# Patient Record
Sex: FEMALE | Race: WHITE | NOT HISPANIC OR LATINO | Employment: OTHER | URBAN - METROPOLITAN AREA
[De-identification: names, ages, dates, MRNs, and addresses within clinical notes are randomized per-mention and may not be internally consistent; named-entity substitution may affect disease eponyms.]

---

## 2023-01-19 ENCOUNTER — TELEPHONE (OUTPATIENT)
Dept: OTOLARYNGOLOGY | Facility: CLINIC | Age: 63
End: 2023-01-19

## 2023-01-19 ENCOUNTER — OFFICE VISIT (OUTPATIENT)
Dept: OTOLARYNGOLOGY | Facility: CLINIC | Age: 63
End: 2023-01-19
Payer: COMMERCIAL

## 2023-01-19 VITALS — TEMPERATURE: 97.3 F | BODY MASS INDEX: 23.06 KG/M2 | WEIGHT: 138.4 LBS | HEIGHT: 65 IN

## 2023-01-19 DIAGNOSIS — K14.8 MASS OF TONGUE: Primary | ICD-10-CM

## 2023-01-19 DIAGNOSIS — J39.2 MASS OF PHARYNX: ICD-10-CM

## 2023-01-19 PROCEDURE — 31575 DIAGNOSTIC LARYNGOSCOPY: CPT | Performed by: OTOLARYNGOLOGY

## 2023-01-19 PROCEDURE — 99204 OFFICE O/P NEW MOD 45 MIN: CPT | Performed by: OTOLARYNGOLOGY

## 2023-01-19 RX ORDER — AMOXICILLIN 875 MG/1
875 TABLET, COATED ORAL EVERY 12 HOURS
COMMUNITY
Start: 2023-01-06 | End: 2023-02-09

## 2023-01-19 NOTE — PROGRESS NOTES
"Patient Name: Carina Serna   Visit Date: 01/19/2023   Patient ID: [id number]  Provider: Migel Escobar MD    Sex: female  Location: [facility]   YOB: 1960  Location Address: [address] [place] [address], [address] [place] [address],?[place]?[id number]    Primary Care Provider Provider, No Known  Location Phone: [phone]    Referring Provider: GRACY Samuels        Chief Complaint  Chronic disease of tonsils and adenoids  (New patient )    Subjective    History of Present Illness  Carina Serna is a 62 y.o. female who presents to [facility] today as a consult from GRACY Samuels.    She presents to the clinic today for evaluation of right-sided throat pain that she has had for several months.  She is a chronic smoker, currently smokes about 1 pack/day.  She informs me that she has had her tonsils out previously.  She denies any weight loss or pain or difficulty with swallowing.  She has had no breathing issues.  Her nurse practitioner noticed the lesion along her right tonsil and referred her for further evaluation.    Past Medical History:   Diagnosis Date   • Dental disease    • Sore throat        Past Surgical History:   Procedure Laterality Date   • BLADDER SURGERY     • EYE SURGERY     • HYSTERECTOMY           Current Outpatient Medications:   •  amoxicillin (AMOXIL) 875 MG tablet, Take 875 mg by mouth Every 12 (Twelve) Hours., Disp: , Rfl:      No Known Allergies    Family History   Problem Relation Age of Onset   • Cancer Mother         Colon cancer        Social History     Social History Narrative   • Not on file       Objective     Vital Signs:   Temp 97.3 °F (36.3 °C) (Tympanic)   Ht 165.1 cm (65\")   Wt 62.8 kg (138 lb 6.4 oz)   BMI 23.03 kg/m²       Physical Exam    Flexible laryngoscopy    Date/Time: 1/19/2023 1:26 PM  Performed by: Migel Escobar MD  Authorized by: Migel Escobar MD     Procedure details:     " Indications: staging of a suspected neoplasm      Medication:  Afrin    Instrument: flexible fiberoptic laryngoscope      Scope location: right nare    Comments:      The nasal cavity is free of any lesions, polyps, or purulence.  There is a mass at the right lateral base of tongue/glossotonsillar sulcus that appears to be ulcerative.  Consistent in appearance with squamous cell carcinoma.  Vocal fold mobility is normal.        Constitutional   Appearance  · : well developed, well-nourished, alert and in no acute distress, voice clear and strong    Head  Inspection  · : no deformities or lesions  Face  Inspection  · : No facial lesions; House-Brackmann I/VI bilaterally  Palpation  · : No TMJ crepitus nor  muscle tenderness bilaterally    Eyes  Vision  Visual Fields  · : Extraocular movements are intact. No spontaneous or gaze-induced nystagmus.  Conjunctivae  · : clear  Sclerae  · : clear  Pupils and Irises  · : pupils equal, round, and reactive to light.     Ears, Nose, Mouth and Throat    Ears    External Ears  · : appearance within normal limits, no lesions present  Otoscopic Examination  · : Tympanic membrane appearance within normal limits bilaterally without perforations, well-aerated middle ears  Hearing  · : intact to conversational voice both ears  Tunning fork testing:     :    Nose    External Nose  · : appearance normal  Intranasal Exam  · : mucosa within normal limits, vestibules normal, no intranasal lesions present, septum midline, sinuses non tender to percussion  Oral Cavity    Oral Mucosa  · : oral mucosa normal without pallor or cyanosis  Lips  · : lip appearance normal  Teeth  · : Grossly carious, and poor repair  Gums  · : gums pink, non-swollen, no bleeding present  Tongue  · : tongue with neoplasm along the glossotonsillar sulcus on the right, appears malignant  Palate  · : hard palate normal, soft palate appearance normal with symmetric mobility    Throat    Oropharynx  · :  Ulcerative mass present in the right glossotonsillar sulcus, tonsils surgically absent  Hypopharynx  · : appearance within normal limits, superior epiglottis within normal limits  Larynx  · : appearance within normal limits, vocal cords within normal limits, no lesions present    Neck  Inspection/Palpation  · : normal appearance, no masses or tenderness, trachea midline; thyroid size normal, nontender, no nodules or masses present on palpation    Respiratory  Respiratory Effort  · : breathing unlabored  Inspection of Chest  · : normal appearance, no retractions    Cardiovascular  Heart  · : regular rate and rhythm    Lymphatic  Neck  · : no lymphadenopathy present  Supraclavicular Nodes  · : no lymphadenopathy present  Preauricular Nodes  · : no lymphadenopathy present    Skin and Subcutaneous Tissue  General Inspection  · : Regarding face and neck - there are no rashes present, no lesions present, and no areas of discoloration    Neurologic  Cranial Nerves  · : cranial nerves II-XII are grossly intact bilaterally  Gait and Station  · : normal gait, able to stand without diffculty    Psychiatric  Judgement and Insight  · : judgment and insight intact  Mood and Affect  · : mood normal, affect appropriate          Assessment and Plan    Diagnoses and all orders for this visit:    1. Mass of tongue (Primary)  -     CT Soft Tissue Neck With Contrast; Future  -     CT Chest With Contrast Diagnostic; Future  -     Case Request; Standing  -     Case Request    2. Mass of pharynx  -     CT Soft Tissue Neck With Contrast; Future  -     CT Chest With Contrast Diagnostic; Future  -     Case Request; Standing  -     Case Request    Other orders  -     $ Laryngoscopy  -     Follow Anesthesia Guidelines / Protocol; Future  -     Follow Anesthesia Guidelines / Protocol; Standing  -     Verify NPO Status; Standing  -     Obtain Informed Consent; Standing    Examination today revealed a mass along the right side of her base of  tongue along the glossotonsillar sulcus.  This appears to be malignant.  I discussed the findings with her, did recommend CT scan of the neck and chest as well as proceeding to the operating room for direct laryngoscopy with biopsies, bronchoscopy, esophagoscopy.  I described the procedure to her in detail, including the possible complications and alternatives.  She understands, and would like to proceed.  I will make arrangements to have her scheduled for this in the near future.  We had a lengthy discussion about the importance of not smoking and the relation between smoking and head and neck cancer.  She will try to cut down.    Follow Up   No follow-ups on file.  Patient was given instructions and counseling regarding her condition or for health maintenance advice. Please see specific information pulled into the AVS if appropriate.

## 2023-01-25 ENCOUNTER — HOSPITAL ENCOUNTER (OUTPATIENT)
Dept: CT IMAGING | Facility: HOSPITAL | Age: 63
Discharge: HOME | End: 2023-01-25
Admitting: OTOLARYNGOLOGY
Payer: COMMERCIAL

## 2023-01-25 DIAGNOSIS — K14.8 MASS OF TONGUE: ICD-10-CM

## 2023-01-25 DIAGNOSIS — J39.2 MASS OF PHARYNX: ICD-10-CM

## 2023-01-25 LAB
CREAT BLDA-MCNC: 0.8 MG/DL
EGFRCR SERPLBLD CKD-EPI 2021: 83.4 ML/MIN/1.73

## 2023-01-25 PROCEDURE — 0 IOPAMIDOL PER 1 ML: Performed by: OTOLARYNGOLOGY

## 2023-01-25 PROCEDURE — 71260 CT THORAX DX C+: CPT

## 2023-01-25 PROCEDURE — 70491 CT SOFT TISSUE NECK W/DYE: CPT

## 2023-01-25 PROCEDURE — 82565 ASSAY OF CREATININE: CPT

## 2023-01-25 RX ADMIN — IOPAMIDOL 140 ML: 755 INJECTION, SOLUTION INTRAVENOUS at 14:06

## 2023-01-27 ENCOUNTER — TELEPHONE (OUTPATIENT)
Dept: OTOLARYNGOLOGY | Facility: CLINIC | Age: 63
End: 2023-01-27

## 2023-01-27 ENCOUNTER — TELEPHONE (OUTPATIENT)
Dept: OTOLARYNGOLOGY | Facility: CLINIC | Age: 63
End: 2023-01-27
Payer: COMMERCIAL

## 2023-01-27 NOTE — TELEPHONE ENCOUNTER
Caller: MICHELLE WU    Relationship to patient: SELF    Best call back number: [phone]    Chief complaint: Mass of tongue, Mass of pharynx     Type of visit: DIRECT LARYNGOSCOPY, ESOPHAGOSCOPY, BRONCHOSCOPY     Requested date: UNSURE YET     If rescheduling, when is the original appointment: 1/30/23    Additional notes: PT'S DAUGHTER IS PREGNANT AND BEING INDUCED ON 1/30/23 SO SHE WILL CALL BACK TO RESCHEDULE HER SURGERY. SHE WILL NOT BE ABLE TO HAVE IT ON 1/30/23.

## 2023-01-27 NOTE — TELEPHONE ENCOUNTER
Left message for patient to call me back ASAP regarding her surgery scheduled on 01/30/2023 that she wants to reschedule.

## 2023-01-30 ENCOUNTER — ANESTHESIA (OUTPATIENT)
Dept: PERIOP | Facility: HOSPITAL | Age: 63
End: 2023-01-30

## 2023-01-30 ENCOUNTER — ANESTHESIA EVENT (OUTPATIENT)
Dept: PERIOP | Facility: HOSPITAL | Age: 63
End: 2023-01-30

## 2023-02-01 ENCOUNTER — TELEPHONE (OUTPATIENT)
Dept: OTOLARYNGOLOGY | Facility: CLINIC | Age: 63
End: 2023-02-01
Payer: COMMERCIAL

## 2023-02-01 NOTE — TELEPHONE ENCOUNTER
Please see no show letter regarding missed surgery date on 01/30/2023. Patient was also tried several times by phone number to be reached with no success.

## 2023-02-09 NOTE — PRE-PROCEDURE INSTRUCTIONS
PATIENT INSTRUCTED TO BE:    - NPO AFTER MIDNIGHT EXCEPT CAN HAVE CLEAR LIQUIDS 2 HOURS PRIOR TO SURGERY ARRIVAL TIME     - TO HOLD ALL VITAMINS, SUPPLEMENTS, NSAIDS FOR ONE WEEK PRIOR TO THEIR SURGICAL PROCEDURE    - INSTRUCTED PT TO USE SURGICAL SOAP 1 TIME THE NIGHT PRIOR TO SURGERY OR THE AM OF SURGERY.   USE SOAP FROM NECK TO TOES AVOID THEIR FACE, HAIR, AND PRIVATE PARTS. INSTRUCTED NO LOTIONS, JEWELRY, PIERCINGS, OR DEODORANT DAY OF SURGERY        - INSTRUCTED TO TAKE THE FOLLOWING MEDICATIONS THE DAY OF SURGERY:   NONE    PATIENT VERBALIZED UNDERSTANDING

## 2023-02-10 ENCOUNTER — HOSPITAL ENCOUNTER (OUTPATIENT)
Facility: HOSPITAL | Age: 63
Setting detail: HOSPITAL OUTPATIENT SURGERY
Discharge: HOME | End: 2023-02-10
Attending: OTOLARYNGOLOGY | Admitting: OTOLARYNGOLOGY
Payer: COMMERCIAL

## 2023-02-10 ENCOUNTER — ANESTHESIA (OUTPATIENT)
Dept: PERIOP | Facility: HOSPITAL | Age: 63
End: 2023-02-10
Payer: COMMERCIAL

## 2023-02-10 ENCOUNTER — ANESTHESIA EVENT (OUTPATIENT)
Dept: PERIOP | Facility: HOSPITAL | Age: 63
End: 2023-02-10
Payer: COMMERCIAL

## 2023-02-10 VITALS
HEIGHT: 65 IN | HEART RATE: 68 BPM | SYSTOLIC BLOOD PRESSURE: 142 MMHG | DIASTOLIC BLOOD PRESSURE: 83 MMHG | OXYGEN SATURATION: 92 % | BODY MASS INDEX: 21.01 KG/M2 | TEMPERATURE: 97.9 F | WEIGHT: 126.1 LBS | RESPIRATION RATE: 17 BRPM

## 2023-02-10 DIAGNOSIS — K14.8 MASS OF TONGUE: ICD-10-CM

## 2023-02-10 DIAGNOSIS — J39.2 MASS OF PHARYNX: ICD-10-CM

## 2023-02-10 PROCEDURE — 63710000001 PROMETHAZINE PER 12.5 MG: Performed by: NURSE ANESTHETIST, CERTIFIED REGISTERED

## 2023-02-10 PROCEDURE — 25010000002 ONDANSETRON PER 1 MG: Performed by: NURSE ANESTHETIST, CERTIFIED REGISTERED

## 2023-02-10 PROCEDURE — 25010000002 DEXAMETHASONE PER 1 MG: Performed by: NURSE ANESTHETIST, CERTIFIED REGISTERED

## 2023-02-10 PROCEDURE — 88342 IMHCHEM/IMCYTCHM 1ST ANTB: CPT | Performed by: OTOLARYNGOLOGY

## 2023-02-10 PROCEDURE — 43200 ESOPHAGOSCOPY FLEXIBLE BRUSH: CPT | Performed by: OTOLARYNGOLOGY

## 2023-02-10 PROCEDURE — 25010000002 MIDAZOLAM PER 1 MG: Performed by: ANESTHESIOLOGY

## 2023-02-10 PROCEDURE — 88305 TISSUE EXAM BY PATHOLOGIST: CPT | Performed by: OTOLARYNGOLOGY

## 2023-02-10 PROCEDURE — 31622 DX BRONCHOSCOPE/WASH: CPT | Performed by: OTOLARYNGOLOGY

## 2023-02-10 PROCEDURE — 31536 LARYNGOSCOPY W/BX & OP SCOPE: CPT | Performed by: OTOLARYNGOLOGY

## 2023-02-10 PROCEDURE — 25010000002 FENTANYL CITRATE (PF) 50 MCG/ML SOLUTION: Performed by: NURSE ANESTHETIST, CERTIFIED REGISTERED

## 2023-02-10 PROCEDURE — 25010000002 PROPOFOL 10 MG/ML EMULSION: Performed by: NURSE ANESTHETIST, CERTIFIED REGISTERED

## 2023-02-10 PROCEDURE — 93010 ELECTROCARDIOGRAM REPORT: CPT | Performed by: INTERNAL MEDICINE

## 2023-02-10 PROCEDURE — 93005 ELECTROCARDIOGRAM TRACING: CPT | Performed by: OTOLARYNGOLOGY

## 2023-02-10 RX ORDER — OXYMETAZOLINE HYDROCHLORIDE 0.05 G/100ML
SPRAY NASAL AS NEEDED
Status: DISCONTINUED | OUTPATIENT
Start: 2023-02-10 | End: 2023-02-10 | Stop reason: HOSPADM

## 2023-02-10 RX ORDER — HYDROCODONE BITARTRATE AND ACETAMINOPHEN 5; 325 MG/1; MG/1
1 TABLET ORAL EVERY 6 HOURS PRN
Qty: 30 TABLET | Refills: 0 | Status: SHIPPED | OUTPATIENT
Start: 2023-02-10

## 2023-02-10 RX ORDER — FENTANYL CITRATE 50 UG/ML
INJECTION, SOLUTION INTRAMUSCULAR; INTRAVENOUS AS NEEDED
Status: DISCONTINUED | OUTPATIENT
Start: 2023-02-10 | End: 2023-02-10 | Stop reason: SURG

## 2023-02-10 RX ORDER — LIDOCAINE HYDROCHLORIDE 20 MG/ML
INJECTION, SOLUTION EPIDURAL; INFILTRATION; INTRACAUDAL; PERINEURAL AS NEEDED
Status: DISCONTINUED | OUTPATIENT
Start: 2023-02-10 | End: 2023-02-10 | Stop reason: SURG

## 2023-02-10 RX ORDER — DEXAMETHASONE SODIUM PHOSPHATE 4 MG/ML
INJECTION, SOLUTION INTRA-ARTICULAR; INTRALESIONAL; INTRAMUSCULAR; INTRAVENOUS; SOFT TISSUE AS NEEDED
Status: DISCONTINUED | OUTPATIENT
Start: 2023-02-10 | End: 2023-02-10 | Stop reason: SURG

## 2023-02-10 RX ORDER — OXYCODONE HYDROCHLORIDE 5 MG/1
5 TABLET ORAL
Status: DISCONTINUED | OUTPATIENT
Start: 2023-02-10 | End: 2023-02-10 | Stop reason: HOSPADM

## 2023-02-10 RX ORDER — PHENYLEPHRINE HCL IN 0.9% NACL 1 MG/10 ML
SYRINGE (ML) INTRAVENOUS AS NEEDED
Status: DISCONTINUED | OUTPATIENT
Start: 2023-02-10 | End: 2023-02-10 | Stop reason: SURG

## 2023-02-10 RX ORDER — ACETAMINOPHEN 500 MG
1000 TABLET ORAL ONCE
Status: COMPLETED | OUTPATIENT
Start: 2023-02-10 | End: 2023-02-10

## 2023-02-10 RX ORDER — PROMETHAZINE HYDROCHLORIDE 25 MG/1
25 SUPPOSITORY RECTAL ONCE AS NEEDED
Status: COMPLETED | OUTPATIENT
Start: 2023-02-10 | End: 2023-02-10

## 2023-02-10 RX ORDER — ONDANSETRON 2 MG/ML
4 INJECTION INTRAMUSCULAR; INTRAVENOUS ONCE AS NEEDED
Status: DISCONTINUED | OUTPATIENT
Start: 2023-02-10 | End: 2023-02-10 | Stop reason: HOSPADM

## 2023-02-10 RX ORDER — MIDAZOLAM HYDROCHLORIDE 1 MG/ML
2 INJECTION INTRAMUSCULAR; INTRAVENOUS ONCE
Status: COMPLETED | OUTPATIENT
Start: 2023-02-10 | End: 2023-02-10

## 2023-02-10 RX ORDER — ONDANSETRON 4 MG/1
4 TABLET, FILM COATED ORAL EVERY 8 HOURS PRN
Qty: 12 TABLET | Refills: 0 | Status: SHIPPED | OUTPATIENT
Start: 2023-02-10 | End: 2023-02-21

## 2023-02-10 RX ORDER — DEXMEDETOMIDINE HYDROCHLORIDE 100 UG/ML
INJECTION, SOLUTION INTRAVENOUS AS NEEDED
Status: DISCONTINUED | OUTPATIENT
Start: 2023-02-10 | End: 2023-02-10 | Stop reason: SURG

## 2023-02-10 RX ORDER — ONDANSETRON 2 MG/ML
INJECTION INTRAMUSCULAR; INTRAVENOUS AS NEEDED
Status: DISCONTINUED | OUTPATIENT
Start: 2023-02-10 | End: 2023-02-10 | Stop reason: SURG

## 2023-02-10 RX ORDER — SODIUM CHLORIDE, SODIUM LACTATE, POTASSIUM CHLORIDE, CALCIUM CHLORIDE 600; 310; 30; 20 MG/100ML; MG/100ML; MG/100ML; MG/100ML
9 INJECTION, SOLUTION INTRAVENOUS CONTINUOUS PRN
Status: DISCONTINUED | OUTPATIENT
Start: 2023-02-10 | End: 2023-02-10 | Stop reason: HOSPADM

## 2023-02-10 RX ORDER — MEPERIDINE HYDROCHLORIDE 25 MG/ML
12.5 INJECTION INTRAMUSCULAR; INTRAVENOUS; SUBCUTANEOUS
Status: DISCONTINUED | OUTPATIENT
Start: 2023-02-10 | End: 2023-02-10 | Stop reason: HOSPADM

## 2023-02-10 RX ORDER — PROMETHAZINE HYDROCHLORIDE 12.5 MG/1
25 TABLET ORAL ONCE AS NEEDED
Status: COMPLETED | OUTPATIENT
Start: 2023-02-10 | End: 2023-02-10

## 2023-02-10 RX ORDER — PROPOFOL 10 MG/ML
VIAL (ML) INTRAVENOUS AS NEEDED
Status: DISCONTINUED | OUTPATIENT
Start: 2023-02-10 | End: 2023-02-10 | Stop reason: SURG

## 2023-02-10 RX ORDER — ROCURONIUM BROMIDE 10 MG/ML
INJECTION, SOLUTION INTRAVENOUS AS NEEDED
Status: DISCONTINUED | OUTPATIENT
Start: 2023-02-10 | End: 2023-02-10 | Stop reason: SURG

## 2023-02-10 RX ADMIN — LIDOCAINE HYDROCHLORIDE 100 MG: 20 INJECTION, SOLUTION EPIDURAL; INFILTRATION; INTRACAUDAL; PERINEURAL at 12:40

## 2023-02-10 RX ADMIN — DEXAMETHASONE SODIUM PHOSPHATE 4 MG: 4 INJECTION INTRA-ARTICULAR; INTRALESIONAL; INTRAMUSCULAR; INTRAVENOUS; SOFT TISSUE at 12:51

## 2023-02-10 RX ADMIN — SODIUM CHLORIDE, POTASSIUM CHLORIDE, SODIUM LACTATE AND CALCIUM CHLORIDE: 600; 310; 30; 20 INJECTION, SOLUTION INTRAVENOUS at 13:14

## 2023-02-10 RX ADMIN — ROCURONIUM BROMIDE 35 MG: 10 INJECTION, SOLUTION INTRAVENOUS at 12:40

## 2023-02-10 RX ADMIN — PROPOFOL 50 MG: 10 INJECTION, EMULSION INTRAVENOUS at 13:11

## 2023-02-10 RX ADMIN — FENTANYL CITRATE 100 MCG: 50 INJECTION, SOLUTION INTRAMUSCULAR; INTRAVENOUS at 12:40

## 2023-02-10 RX ADMIN — DEXMEDETOMIDINE HYDROCHLORIDE 20 MCG: 100 INJECTION, SOLUTION, CONCENTRATE INTRAVENOUS at 13:17

## 2023-02-10 RX ADMIN — PROPOFOL 150 MG: 10 INJECTION, EMULSION INTRAVENOUS at 12:40

## 2023-02-10 RX ADMIN — SODIUM CHLORIDE, POTASSIUM CHLORIDE, SODIUM LACTATE AND CALCIUM CHLORIDE 9 ML/HR: 600; 310; 30; 20 INJECTION, SOLUTION INTRAVENOUS at 10:42

## 2023-02-10 RX ADMIN — PROMETHAZINE HYDROCHLORIDE 25 MG: 12.5 TABLET ORAL at 14:09

## 2023-02-10 RX ADMIN — ONDANSETRON 4 MG: 2 INJECTION INTRAMUSCULAR; INTRAVENOUS at 14:00

## 2023-02-10 RX ADMIN — Medication 100 MCG: at 13:03

## 2023-02-10 RX ADMIN — SUGAMMADEX 200 MG: 100 INJECTION, SOLUTION INTRAVENOUS at 13:35

## 2023-02-10 RX ADMIN — MIDAZOLAM HYDROCHLORIDE 2 MG: 2 INJECTION, SOLUTION INTRAMUSCULAR; INTRAVENOUS at 12:21

## 2023-02-10 RX ADMIN — ACETAMINOPHEN 1000 MG: 500 TABLET ORAL at 10:42

## 2023-02-10 RX ADMIN — ROCURONIUM BROMIDE 15 MG: 10 INJECTION, SOLUTION INTRAVENOUS at 13:07

## 2023-02-10 RX ADMIN — ONDANSETRON 4 MG: 2 INJECTION INTRAMUSCULAR; INTRAVENOUS at 13:09

## 2023-02-10 NOTE — OP NOTE
DIRECT LARYNGOSCOPY, ESOPHAGOSCOPY, BRONCHOSCOPY  Procedure Report    Patient Name:  Carina Serna  YOB: 1960    Date of Surgery:  2/10/2023    Pre-op Diagnosis:   Mass of tongue [K14.8]  Mass of pharynx [J39.2]       Post-Op Diagnosis Codes:     * Mass of tongue [K14.8]     * Mass of pharynx [J39.2]    Procedure/CPT® Codes:  99450  68954  96236    Procedure(s):  DIRECT LARYNGOSCOPY with biopsy, ESOPHAGOSCOPY, BRONCHOSCOPY    Staff:  Surgeon(s):  Migel Escobar MD    Anesthesia: General    Estimated Blood Loss: 1 mL    Implants:    Nothing was implanted during the procedure    Specimen:          Specimens     ID Source Type Tests Collected By Collected At Frozen?    A Tongue Tissue · TISSUE PATHOLOGY EXAM   Migel Escobar MD 2/10/23 1320 No    Description: hypopharyngeal /base of tongue mass biopsy    This specimen was not marked as sent.          Findings: 1.  Large ulcerative tumor along the right base of tongue extending onto the pharynx and tonsil, biopsies taken.  2.  Esophageal introitus tight.  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  3.  Normal laryngeal exam.  4.  Normal bronchoscopy    Complications: None    Description of Procedure:     The patient was brought to the operating room and placed in the supine position on the operating room table.  Mask inhalational anesthesia was induced, and she was intubated orotracheally without difficulty using a size 6 endotracheal tube.  Timeout was performed to identify the correct patient and procedure.      Next, a tooth guard was placed to protect the maxillary dentition.  The Edgar laryngoscope was introduced into the oral cavity, and passed down to the level of the larynx.  The scope was then suspended on a Kirk stand, and magnification was used using the SA Ignite scope system.  The epiglottis appeared completely normal.  There was a large ulcerative mass along the right base of tongue  extending onto the pharynx and right tonsil.  Several biopsies were taken.  Afrin-soaked pledgets were used to achieve hemostasis. The vocal folds were clean and free of any lesions. Photodocumentation of the findings was obtained.     Next, the bronchoscope was passed through the vocal folds into the subglottic area.  The subglottic area was normal without any lesions or masses, and was not narrowed.  The endotracheal tube was taken down and the trachea was assessed.  The tracheal rings were intact, and there was no evidence of obstruction or stricture.  The [place] was normal.  There were no abnormalities noted on bronchoscopy.  Photos were taken.    Next, the bronchoscope and laryngoscope were withdrawn.  The cervical esophagoscope was introduced into the esophageal introitus, and I was not able to pass through the superior portion of the esophagus.  A pediatric esophagoscope was then passed easily down the level of the cervical esophagus.  Suction was used to get rid of secretions, and the mucosa was thoroughly assess.  The Charli scopes were used to assess the mucosa of the scope was withdrawn, and pictures were taken.  There were several small patches of leukoplakia without ulceration.  This concluded the procedure, the patient's care was handed back to anesthesia in good condition without any complications.    Migel Escobar MD     Date: 2/10/2023  Time: 14:41 EST

## 2023-02-10 NOTE — ANESTHESIA POSTPROCEDURE EVALUATION
Patient: Carina Serna    Procedure Summary     Date: 02/10/23 Room / Location: [facility] OR  / [facility]    Anesthesia Start: 1238 Anesthesia Stop: 1348    Procedure: DIRECT LARYNGOSCOPY, ESOPHAGOSCOPY, BRONCHOSCOPY (Throat) Diagnosis:       Mass of tongue      Mass of pharynx      (Mass of tongue [K14.8])      (Mass of pharynx [J39.2])    Surgeons: Migel Escobar MD Provider: Nikolai Ramirez MD    Anesthesia Type: general ASA Status: 1          Anesthesia Type: general    Vitals  Vitals Value Taken Time   /64 02/10/23 1416   Temp 36.1 °C (97 °F) 02/10/23 1416   Pulse 69 02/10/23 1416   Resp 16 02/10/23 1416   SpO2 94 % 02/10/23 1416           Post Anesthesia Care and Evaluation    Patient location during evaluation: bedside  Patient participation: complete - patient participated  Level of consciousness: awake  Pain management: adequate    Airway patency: patent  PONV Status: none  Cardiovascular status: acceptable  Respiratory status: acceptable  Hydration status: acceptable    Comments: An Anesthesiologist personally participated in the most demanding procedures (including induction and emergence if applicable) in the anesthesia plan, monitored the course of anesthesia administration at frequent intervals and remained physically present and available for immediate diagnosis and treatment of emergencies.

## 2023-02-10 NOTE — DISCHARGE INSTRUCTIONS
DISCHARGE INSTRUCTIONS  QUADSCOPE      For your surgery you had:  General Anesthesia (you may have a sore throat for the first 24 hours)  You may experience dizziness, drowsiness, or lightheadedness for several hours following surgery/procedure.  Do not stay alone today or tonight.  Limit your activity for 24 hours.  You should not drive or operate machinery, drink alcohol, or sign legally binding documents for 24 hours or while you are taking pain medication.  Resume your diet slowly.  Follow any special dietary instructions you may have been given by your doctor.  Last dose of pain medication was given at:   .    NOTIFY YOUR DOCTOR IF YOU EXPERIENCE ANY OF THE FOLLOWING:  Temperature greater than 101 degrees Fahrenheit  Shaking Chills  Redness or excessive drainage from incision  Nausea, vomiting and/or pain that is not controlled by prescribed medications  Increase in bleeding or bleeding that is excessive  Unable to urinate in 6 hours after surgery  If unable to reach your doctor, please go to the closest Emergency Room Following your Quadscope, you may experience a mild sore throat or cough up small amounts of blood.  Extremes of either should be reported to your doctor.  If you have shortness of breath or chest pain, you should notify your doctor.  Smokers are encouraged not to smoke for 6 to 8 hours after the procedure to decrease the risk of coughing and bleeding.  Voice rest for 3-4 days.  Medications per physician instructions as indicated on Discharge Medication Information Sheet.    SPECIAL INSTRUCTIONS:                        1.  DC home  2.  Follow-up in ENT clinic in 2 weeks mikey pavon  3.  Norco prescription given, transition to Tylenol when able  4.  Zofran prescription given

## 2023-02-10 NOTE — ANESTHESIA PREPROCEDURE EVALUATION
Anesthesia Evaluation     Patient summary reviewed and Nursing notes reviewed                Airway   Mallampati: I  TM distance: >3 FB  Neck ROM: full  No difficulty expected  Dental    (+) poor dentition    Pulmonary - negative pulmonary ROS and normal exam    breath sounds clear to auscultation  Cardiovascular - negative cardio ROS and normal exam    Rhythm: regular  Rate: normal        Neuro/Psych- negative ROS  GI/Hepatic/Renal/Endo - negative ROS     Musculoskeletal     (+) neck pain,   Abdominal    Substance History - negative use     OB/GYN negative ob/gyn ROS         Other                      Anesthesia Plan    ASA 1     general     intravenous induction     Anesthetic plan, risks, benefits, and alternatives have been provided, discussed and informed consent has been obtained with: patient.        CODE STATUS:

## 2023-02-13 LAB
CYTO UR: NORMAL
LAB AP CASE REPORT: NORMAL
LAB AP CLINICAL INFORMATION: NORMAL
LAB AP SPECIAL STAINS: NORMAL
PATH REPORT.FINAL DX SPEC: NORMAL
PATH REPORT.GROSS SPEC: NORMAL

## 2023-02-17 ENCOUNTER — DOCUMENTATION (OUTPATIENT)
Dept: OTOLARYNGOLOGY | Facility: CLINIC | Age: 63
End: 2023-02-17
Payer: COMMERCIAL

## 2023-02-17 NOTE — PROGRESS NOTES
Left the patient a voicemail discussing that I would call her back on Monday regarding her recent procedure.

## 2023-02-20 DIAGNOSIS — C01 SQUAMOUS CELL CARCINOMA OF BASE OF TONGUE: Primary | ICD-10-CM

## 2023-02-21 ENCOUNTER — CONSULT (OUTPATIENT)
Dept: RADIATION ONCOLOGY | Facility: HOSPITAL | Age: 63
End: 2023-02-21
Payer: COMMERCIAL

## 2023-02-21 VITALS
BODY MASS INDEX: 20.98 KG/M2 | TEMPERATURE: 97.6 F | SYSTOLIC BLOOD PRESSURE: 142 MMHG | WEIGHT: 126.1 LBS | RESPIRATION RATE: 16 BRPM | DIASTOLIC BLOOD PRESSURE: 90 MMHG | HEART RATE: 78 BPM | OXYGEN SATURATION: 98 %

## 2023-02-21 DIAGNOSIS — K14.8 MASS OF TONGUE: ICD-10-CM

## 2023-02-21 DIAGNOSIS — C13.9 SQUAMOUS CELL CARCINOMA OF HYPOPHARYNX: ICD-10-CM

## 2023-02-21 DIAGNOSIS — C01 PRIMARY SQUAMOUS CELL CARCINOMA OF BASE OF TONGUE: Primary | ICD-10-CM

## 2023-02-21 DIAGNOSIS — C13.8 SQUAMOUS CELL CARCINOMA OF OVERLAPPING SITES OF HYPOPHARYNX: ICD-10-CM

## 2023-02-21 PROCEDURE — 92511 NASOPHARYNGOSCOPY: CPT | Performed by: RADIOLOGY

## 2023-02-21 PROCEDURE — G0463 HOSPITAL OUTPT CLINIC VISIT: HCPCS | Performed by: RADIOLOGY

## 2023-02-21 PROCEDURE — 99205 OFFICE O/P NEW HI 60 MIN: CPT | Performed by: RADIOLOGY

## 2023-02-21 NOTE — PROGRESS NOTES
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  Ms. Serna has experienced a sore throat for a number of months.  She was ultimately referred to Dr. Escobar for evaluation and underwent flexible fiberoptic laryngoscopy revealing a mass at the right lateral base of tongue and glossotonsillar sulcus.  Pathology from biopsy revealed HPV negative, moderately differentiated squamous cell carcinoma.  CT scan of the soft tissue neck performed on 1/25/2023 revealed a 2.7 x 2.3 cm abnormal soft tissue density likely representing neoplasm centered at the lateral right oropharynx with extension toward the lingual tonsil.  There is a moderately suspicious rounded right level 2 lymph node measuring 0.7 cm in the short axis.  CT scan of the chest revealed a subcarinal lymph node at the upper limit of normal for size.  Ms. Serna reports right-sided otalgia and some odynophagia but denies aspirating food or drink.  She additionally denies voice changes.    Subjective       Review of Systems: Review of Systems   Constitutional: Positive for fatigue (5/10) and unexpected weight change (10 lb wt loss in one month). Negative for appetite change.   HENT: Positive for ear pain, sore throat and trouble swallowing. Negative for tinnitus and voice change.    Eyes: Negative for visual disturbance.        Wears glasses   Respiratory: Positive for cough (dry). Negative for shortness of breath.    Cardiovascular: Negative for chest pain and  palpitations.   Gastrointestinal: Negative for constipation, diarrhea and nausea.   Genitourinary: Negative for difficulty urinating, dysuria, frequency and urgency.   Musculoskeletal: Negative for arthralgias, back pain, neck pain and neck stiffness.   Skin: Negative for rash.   Neurological: Negative for dizziness and headaches.   Psychiatric/Behavioral: Positive for sleep disturbance (difficulty falling and staying asleep). Negative for agitation and suicidal ideas.       Past Medical History:   Past Medical History:   Diagnosis Date   • Dental disease    • Sore throat        Past Surgical History:   Past Surgical History:   Procedure Laterality Date   • BLADDER SURGERY     • DIRECT LARYNGOSCOPY, ESOPHAGOSCOPY, BRONCHOSCOPY N/A 2/10/2023    Procedure: DIRECT LARYNGOSCOPY, ESOPHAGOSCOPY, BRONCHOSCOPY;  Surgeon: Migel Escobar MD;  Location: [facility] [place];  Service: ENT;  Laterality: N/A;   • EYE SURGERY     • HYSTERECTOMY     • TONSILLECTOMY         Family History:   Family History   Problem Relation Age of Onset   • Cancer Mother         Colon cancer   • Heart attack Father    • Colon cancer Maternal Aunt    • Malig Hyperthermia Neg Hx        Social History:   Social History     Socioeconomic History   • Marital status:    Tobacco Use   • Smoking status: Former     Packs/day: 1.00     Years: 30.00     Pack years: 30.00     Types: Cigarettes     Quit date: 2023     Years since quittin.0   • Smokeless tobacco: Never   Vaping Use   • Vaping Use: Never used   Substance and Sexual Activity   • Alcohol use: Not Currently   • Drug use: Never   • Sexual activity: Defer       Medications:     Current Outpatient Medications:   •  HYDROcodone-acetaminophen (NORCO) 5-325 MG per tablet, Take 1 tablet by mouth Every 6 (Six) Hours As Needed for Moderate Pain., Disp: 30 tablet, Rfl: 0    Allergies:   No Known Allergies    Pain: (on a scale of 0-10)   Pain Score    23 1308   PainSc:   5   PainLoc:  Throat       Advanced Care Plan: N   Quality of Life: 100 - Full Activity    Objective     Physical Exam:   Vital Signs:   Vitals:    02/21/23 1308   BP: 142/90  Comment: manual   Pulse: 78   Resp: 16   Temp: 97.6 °F (36.4 °C)   TempSrc: Temporal   SpO2: 98%   Weight: 57.2 kg (126 lb 1.7 oz)   PainSc:   5   PainLoc: Throat     Body mass index is 20.98 kg/m².     Physical Exam  HENT:      Head: Normocephalic and atraumatic.      Mouth/Throat:      Mouth: Mucous membranes are moist.   Pulmonary:      Effort: Pulmonary effort is normal. No respiratory distress.   Abdominal:      General: Abdomen is flat. There is no distension.   Skin:     General: Skin is warm and dry.      Coloration: Skin is not jaundiced.   Neurological:      General: No focal deficit present.      Mental Status: She is alert.      Cranial Nerves: No cranial nerve deficit.   Psychiatric:         Mood and Affect: Mood normal.       Nasopharyngoscopy Note    02/21/2023    Franklin Cespedes*         Chief Complaint   Patient presents with   • Consult   • Squamous Cell Carcinoma       [Associated problem not found]    Rationale/Reason for Procedure (65226): Head neck cancer    Procedure: After verbal consent was obtained, the left naris was sprayed with benzocaine. After adequate analgesic time, the flexible laryngoscope was passed through the right naris. The right nasal cavity and nasopharynx were within normal limits. The right torus tubarius was visualized without any lesions. The scope was passed into the oropharynx where an exophytic mass was appreciated on the right lateral pharyngeal wall.  This mass was ulcerative and extended to the right base of tongue.  The pyriform sinuses were without disease. Both arytenoids were freely mobile as were both true vocal cords. A view of the subglottis and tracheal rings was apparent with no abnormalities was observed.  The scope was then removed.  Ms. Serna tolerated the procedure well.                                  Nikolai Weiner MD / 02/21/2023 / 15:57 EST  Radiation Oncologist Signature / Date / Time    Results:   Radiographs: CT Soft Tissue Neck With Contrast    Result Date: 1/25/2023    1. 2.7 cm x 2.3 cm abnormal soft tissue density likely representing neoplasm centered in the lateral right oropharynx with extension toward the lingual tonsil 2. Moderately suspicious round right level 2 lymph node measuring 0.7 cm in short axis.  Consider PET-CT to further evaluate. 3. Probable periapical abscesses versus dentigerous cysts involving the right mandibular 1st premolar and 3rd molar teeth.  The 3rd molar is impacted.  The 1st premolar appears carious or chipped.     Gabriel Prather M.D.       Electronically Signed and Approved By: Gabriel Prather M.D. on 1/25/2023 at 15:32             CT Chest With Contrast Diagnostic    Result Date: 1/25/2023    1. Subcarinal lymph node at the upper limit of normal for size.  Consider follow-up chest CT versus PET/CT to further evaluate. 2. Mild emphysematous changes 3. Small hiatal hernia     Gabriel Prather M.D.       Electronically Signed and Approved By: Gabriel Prather M.D. on 1/25/2023 at 16:29           I personally reviewed the CT scan of the chest as well as a CT scan of the soft tissue neck from 1/25/2023.  The pertinent findings are as above.    Pathology: I personally reviewed the pathology report from the procedure performed on 2/10/2023.  The pertinent findings are as above in HPI.      Assessment / Plan          Assessment/Plan:   Carina Serna is a 62-year-old female with cT2 cN2c cM0 moderately differentiated, HPV negative squamous cell carcinoma of the right base of tongue.  She has no clinical or radiographic evidence of distant metastatic disease.  She does have an enlarged subcarinal lymph node of undetermined significance.  ECOG 0    I discussed the clinical, radiographic and pathologic findings to date with Ms. Serna.  I explained the role of  radiotherapy in the definitive management of head neck cancer, outlining the rationale for this approach.  I discussed the risks and potential benefits of external beam radiotherapy concurrent with chemotherapy and explained that without treatment there is risk of death.  I have referred her for evaluation by oral surgery.  I have additionally referred her to general surgery for port and PEG placement she will be evaluated by speech and swallow therapy before initiating external beam radiotherapy.  A PET scan has been ordered and we will facilitate prompt completion of the study.  Ms. Serna will be evaluated by hematology/oncology this week.        Nikolai Weiner MD  Radiation Oncology  [facility]    This document has been signed by Nikolai Weiner MD on February 21, 2023 15:57 EST

## 2023-02-24 ENCOUNTER — TELEPHONE (OUTPATIENT)
Dept: NUTRITION | Facility: HOSPITAL | Age: 63
End: 2023-02-24
Payer: COMMERCIAL

## 2023-02-24 ENCOUNTER — CONSULT (OUTPATIENT)
Dept: ONCOLOGY | Facility: HOSPITAL | Age: 63
End: 2023-02-24
Payer: COMMERCIAL

## 2023-02-24 ENCOUNTER — OFFICE VISIT (OUTPATIENT)
Dept: SURGERY | Facility: CLINIC | Age: 63
End: 2023-02-24
Payer: COMMERCIAL

## 2023-02-24 ENCOUNTER — PREP FOR SURGERY (OUTPATIENT)
Dept: OTHER | Facility: HOSPITAL | Age: 63
End: 2023-02-24
Payer: COMMERCIAL

## 2023-02-24 VITALS — HEIGHT: 65 IN | RESPIRATION RATE: 16 BRPM | BODY MASS INDEX: 20.99 KG/M2 | WEIGHT: 126 LBS

## 2023-02-24 VITALS
RESPIRATION RATE: 18 BRPM | WEIGHT: 126.76 LBS | TEMPERATURE: 96.9 F | OXYGEN SATURATION: 97 % | DIASTOLIC BLOOD PRESSURE: 82 MMHG | HEART RATE: 78 BPM | BODY MASS INDEX: 21.09 KG/M2 | SYSTOLIC BLOOD PRESSURE: 177 MMHG

## 2023-02-24 DIAGNOSIS — C01 PRIMARY SQUAMOUS CELL CARCINOMA OF BASE OF TONGUE: Primary | ICD-10-CM

## 2023-02-24 DIAGNOSIS — R13.10 ODYNOPHAGIA: ICD-10-CM

## 2023-02-24 DIAGNOSIS — C02.9 PRIMARY TONGUE SQUAMOUS CELL CARCINOMA: Primary | ICD-10-CM

## 2023-02-24 PROCEDURE — G0463 HOSPITAL OUTPT CLINIC VISIT: HCPCS | Performed by: INTERNAL MEDICINE

## 2023-02-24 PROCEDURE — 99203 OFFICE O/P NEW LOW 30 MIN: CPT | Performed by: SURGERY

## 2023-02-24 PROCEDURE — 99205 OFFICE O/P NEW HI 60 MIN: CPT | Performed by: INTERNAL MEDICINE

## 2023-02-24 RX ORDER — CEFAZOLIN SODIUM 2 G/100ML
2 INJECTION, SOLUTION INTRAVENOUS ONCE
Status: CANCELLED | OUTPATIENT
Start: 2023-02-24 | End: 2023-02-24

## 2023-02-24 RX ORDER — ONDANSETRON HYDROCHLORIDE 8 MG/1
8 TABLET, FILM COATED ORAL 3 TIMES DAILY PRN
Qty: 30 TABLET | Refills: 5 | Status: SHIPPED | OUTPATIENT
Start: 2023-02-24

## 2023-02-24 RX ORDER — OLANZAPINE 5 MG/1
5 TABLET ORAL NIGHTLY
Qty: 3 TABLET | Refills: 5 | Status: SHIPPED | OUTPATIENT
Start: 2023-02-24

## 2023-02-24 NOTE — PROGRESS NOTES
Chief Complaint  Squamous cell carcinoma of base of tongue    Franklin Cespedes*  Karmen, Edilma, GRACY    Subjective          Carina Avelina Serna presents to [facility] for base of tongue cancer.    Ms. Serna Is a very pleasant 62-year-old female who presents for annual medical oncology evaluation is due to new diagnosis of breast cancer.  She has a benign past medical history.  She reports a history of smoking but quit 2 weeks ago around the time of the diagnosis.  She has been evaluated by Dr. Weiner with radiation oncology.  She reports a several month history of right-sided sore throat and pain with swallowing. She is using hydrocodone for this with minimal relief. She has only been taking it at night.  She denies any dysphagia or nausea and vomiting.  She has some mild right ear pain.  She denies any hearing loss.  She denies any headache.  She denies any weight loss.  Her appetite is stable.  She denies any diarrhea constipation.  She denies any fevers or chills. No voice changes. She has a family history of colon cancer. She is due to see surgery after this regarding Port and PEG placement.     Oncology/Hematology History Overview Note   1/19/23: Referred to Dr. Escobar for evaluation after several months of right sided sore throat. She underwent flexible fiberoptic laryngoscopy revealing a mass at the right lateral base of tongue and glossotonsillar sulcus.  Pathology from biopsy revealed HPV negative, moderately differentiated squamous cell carcinoma.      1/25/2023: CT scan of the soft tissue neck demonstrated a 2.7 x 2.3 cm abnormal soft tissue density likely representing neoplasm centered at the lateral right oropharynx with extension toward the lingual tonsil.  There is a moderately suspicious rounded right level 2 lymph node measuring 0.7 cm in the short axis.  CT scan of the chest revealed a subcarinal lymph node at the upper limit of normal for size.        Primary tongue squamous cell carcinoma (HCC)   2023 Initial Diagnosis    Primary tongue squamous cell carcinoma (HCC)         Review of Systems   All other systems reviewed and are negative.    Current Outpatient Medications on File Prior to Visit   Medication Sig Dispense Refill   • HYDROcodone-acetaminophen (NORCO) 5-325 MG per tablet Take 1 tablet by mouth Every 6 (Six) Hours As Needed for Moderate Pain. 30 tablet 0     No current facility-administered medications on file prior to visit.       No Known Allergies  Past Medical History:   Diagnosis Date   • Dental disease    • Sore throat      Past Surgical History:   Procedure Laterality Date   • BLADDER SURGERY     • DIRECT LARYNGOSCOPY, ESOPHAGOSCOPY, BRONCHOSCOPY N/A 2/10/2023    Procedure: DIRECT LARYNGOSCOPY, ESOPHAGOSCOPY, BRONCHOSCOPY;  Surgeon: Migel Escobar MD;  Location: [facility] OR [facility] [place];  Service: ENT;  Laterality: N/A;   • EYE SURGERY     • HYSTERECTOMY     • TONSILLECTOMY       Social History     Socioeconomic History   • Marital status:    Tobacco Use   • Smoking status: Former     Packs/day: 1.00     Years: 30.00     Pack years: 30.00     Types: Cigarettes     Quit date: 2023     Years since quittin.0   • Smokeless tobacco: Never   Vaping Use   • Vaping Use: Never used   Substance and Sexual Activity   • Alcohol use: Not Currently   • Drug use: Never   • Sexual activity: Defer     Family History   Problem Relation Age of Onset   • Cancer Mother         Colon cancer   • Heart attack Father    • Colon cancer Maternal Aunt    • Malig Hyperthermia Neg Hx        Objective   Physical Exam  Well appearing patient in no acute distress on RA  Anicteric sclerae, no rash on exposed skin  Respirations non-labored  Awake, alert, and oriented x 4. Speech intact. No gross neurologic deficit  Appropriate mood and affect    ECOG 0.    Vitals:    23 1340   BP: 177/82   Pulse: 78   Resp: 18   Temp: 96.9 °F (36.1 °C)   TempSrc:  Temporal   SpO2: 97%   Weight: 57.5 kg (126 lb 12.2 oz)   PainSc:   5   PainLoc: Throat               PHQ-9 Total Score:         The patient is not  experiencing fatigue.   PT/OT Functional Screening: PT fx screen: No needs identified  Speech Functional Screening: Speech fx screen: No needs identified  Rehab to be ordered: Rehab to be ordered: No needs identified      Result Review :   The following data was reviewed by: Sarmad Cohen MD on 02/24/23:  No results found for: HGB, HCT, MCV, PLT, WBC, NEUTROABS, LYMPHSABS, MONOSABS, EOSABS, BASOSABS  Lab Results   Component Value Date    CREATININE 0.80 01/25/2023     No results found for: MG, PHOS, FREET4, TSH         Dr. Tulio shirley personally reviewed.    Dr. Korin shirley personally reviewed    CT neck and CT chest personally reviewed and per my read with soft tissue lesion of right oropharynx. Possible metastatic right sided lymph node. No disease in chest.     CT Soft Tissue Neck With Contrast    Result Date: 1/25/2023    1. 2.7 cm x 2.3 cm abnormal soft tissue density likely representing neoplasm centered in the lateral right oropharynx with extension toward the lingual tonsil 2. Moderately suspicious round right level 2 lymph node measuring 0.7 cm in short axis.  Consider PET-CT to further evaluate. 3. Probable periapical abscesses versus dentigerous cysts involving the right mandibular 1st premolar and 3rd molar teeth.  The 3rd molar is impacted.  The 1st premolar appears carious or chipped.     Gabriel Prather M.D.       Electronically Signed and Approved By: Gabriel Prather M.D. on 1/25/2023 at 15:32             CT Chest With Contrast Diagnostic    Result Date: 1/25/2023    1. Subcarinal lymph node at the upper limit of normal for size.  Consider follow-up chest CT versus PET/CT to further evaluate. 2. Mild emphysematous changes 3. Small hiatal hernia     Gabriel Prather M.D.       Electronically Signed and Approved By: Gabriel Prather M.D. on 1/25/2023 at 16:29                    Assessment and Plan    Diagnoses and all orders for this visit:    1. Primary tongue squamous cell carcinoma (HCC) (Primary)  -     OLANZapine (zyPREXA) 5 MG tablet; Take 1 tablet by mouth Every Night. Take on days 2, 3 and 4 after chemotherapy.  Dispense: 3 tablet; Refill: 5  -     ondansetron (ZOFRAN) 8 MG tablet; Take 1 tablet by mouth 3 (Three) Times a Day As Needed for Nausea or Vomiting.  Dispense: 30 tablet; Refill: 5    Ms. Serna has biopsy proven HPV negative squamous cell carcinoma of right base of tongue.  Per CT imaging with contrast that has been obtained she has possible right-sided level 2 lymph node measuring 0.7 cm.  She has PET scan ordered for more definitive staging.  She has met with Dr. Weiner with radiation oncology.  Plan to discuss management of this case personally with him.  Recommendation for treatment of oropharyngeal cancer is  concurrent chemoradiation with curative intent. Chemotherapy is provided through port.  Port placement referral has been made.  Patient also has referral for PEG tube for need for upcoming supplemental nutrition due to treatment toxicities.  Chemotherapy is with weekly IV cisplatin dosed at 40 mg/m2.  Risk, benefit, side effect profile discussed extensively with patient today. Consent was obtained.  As needed antiemetics to be provided.  Day 2 through 4 Olanzapine will be prescribed.  Patient to get a plate placed in her mouth.  She will then start radiation thereafter.  We will coordinate with radiation to initiate chemotherapy at the same time as radiation.    This is an acute or chronic illness that poses a threat to life or bodily function. The above treatment plan involves a high risk of complications and/or mortality of patient management.    The patient was seen and examined. Work by the provider also included review and/or ordering of lab tests, review and/or ordering of radiology tests, review and/or ordering of medicine tests, discussion  with other physicians or providers, independent review of data, obtaining old records, review/summation of old records, and/or other review.     I have reviewed the family history, social history, and past medical history for this patient. Previous information and data has been reviewed and updated as needed. I have reviewed and verified the chief complaint, history, and other documentation. The patient was interviewed and examined at the bedside and the chart reviewed. The previous observations, recommendations, and conclusions were reviewed including those of other providers.     The plan was discussed with the patient and/or family. The patient was given time to ask questions and these questions were answered. At the conclusion of their visit they had no additional questions or concerns and all questions were answered to their satisfaction.      I spent 50 minutes caring for Carina on this date of service. This time includes time spent by me in the following activities:preparing for the visit, reviewing tests, obtaining and/or reviewing a separately obtained history, performing a medically appropriate examination and/or evaluation , counseling and educating the patient/family/caregiver, ordering medications, tests, or procedures, referring and communicating with other health care professionals , documenting information in the medical record, independently interpreting results and communicating that information with the patient/family/caregiver and care coordination    Patient Follow Up: Prior to C1 chemo  Patient was given instructions and counseling regarding her condition or for health maintenance advice. Please see specific information pulled into the AVS if appropriate.

## 2023-02-24 NOTE — PROGRESS NOTES
Chief Complaint:  port placement consult    Primary Care Provider: Edilma Peraza APRN    Referring Provider: Nikolai Weiner MD    History of Present Illness  Carina Serna is a 62 y.o. female referred by Nikolai Weiner MD to have a portacatheter placed and a PEG tube placed.  The patient has primary squamous cell cancer of the base of the tongue.  The patient is having some pain with swallowing.  Swallowing difficulty will likely worsen after the patient receives radiation.  Patient is scheduled to have some of her teeth removed and a plate put in her mouth several weeks from now and the PEG tube is needed before that occurs.  Patient has had a hysterectomy but otherwise has not had any abdominal surgeries.    Allergies: Patient has no known allergies.    Outpatient Medications Marked as Taking for the 2/24/23 encounter (Office Visit) with Frank Ellington MD   Medication Sig Dispense Refill   • HYDROcodone-acetaminophen (NORCO) 5-325 MG per tablet Take 1 tablet by mouth Every 6 (Six) Hours As Needed for Moderate Pain. 30 tablet 0   • OLANZapine (zyPREXA) 5 MG tablet Take 1 tablet by mouth Every Night. Take on days 2, 3 and 4 after chemotherapy. 3 tablet 5   • ondansetron (ZOFRAN) 8 MG tablet Take 1 tablet by mouth 3 (Three) Times a Day As Needed for Nausea or Vomiting. 30 tablet 5       Past Medical History:   • Dental disease   • Sore throat        Past Surgical History:   • BLADDER SURGERY   • DIRECT LARYNGOSCOPY, ESOPHAGOSCOPY, BRONCHOSCOPY    Procedure: DIRECT LARYNGOSCOPY, ESOPHAGOSCOPY, BRONCHOSCOPY;  Surgeon: Migel Escobar MD;  [place] [facility] [place] [facility] [place]  Service: ENT;  Laterality: N/A;   • EYE SURGERY   • HYSTERECTOMY   • TONSILLECTOMY       Family History:   Family History   Problem Relation Age of Onset   • Cancer Mother         Colon cancer   • Heart attack Father    • Colon cancer Maternal Aunt    • Malig Hyperthermia Neg Hx         Social History:  Social History     Tobacco Use  "  • Smoking status: Former     Packs/day: 1.00     Years: 30.00     Pack years: 30.00     Types: Cigarettes     Quit date: 2023     Years since quittin.0   • Smokeless tobacco: Never   Substance Use Topics   • Alcohol use: Not Currently       Objective     Vital Signs:  Resp 16   Ht 165.1 cm (65\")   Wt 57.2 kg (126 lb)   BMI 20.97 kg/m²   • Constitutional:  present, alert, no acute distress, reliable historian  • HENT:  NCAT, no visible deformities or lesions  • Eyes:  sclerae clear, conjunctivae clear, EOMI  • Neck:  normal appearance, no masses, trachea midline  • Respiratory:  breathing not labored, respiratory effort appears normal  • Cardiovascular:  heart regular rate  • Abdomen:  nondistended    • Skin and subcutaneous tissue:  Warm and dry  • Musculoskeletal: moving all extremities symmetrically and purposefully  • Neurologic:  no obvious motor or sensory deficits, alert & oriented x 3, speech clear      Assessment:  Primary squamous cell carcinoma of base of tongue  Odynophagia    Plan:  Port-a-catheter placement  Percutaneous endoscopic gastrostomy tube placement    Discussion: Indications, options, risks, benefits, and expected outcomes of planned surgery were discussed with the patient and she agrees to proceed.    Frank Ellington MD  2023    Electronically signed by Frank Ellington MD, 23, 10:04 AM EST.      "

## 2023-02-24 NOTE — PROGRESS NOTES
Outpatient Nutrition Oncology Assessment    Patient Name: Carina Serna  YOB: 1960  MRN: [id number]  Assessment Date: 2/24/2023    CLINICAL NUTRITION ASSESSMENT    Dx:  SCC of hypopharynx        Reason for Assessment  Physician consult       Current Problems:   Patient Active Problem List   Diagnosis Code   • Mass of tongue K14.8   • Mass of pharynx J39.2         Anthropometrics     Row Name 02/24/23 1036          Anthropometrics    Weight for Calculation 57.2 kg (126 lb 1.7 oz)                     Weight Hx  Wt Readings from Last 30 Encounters:   02/21/23 1308 57.2 kg (126 lb 1.7 oz)   02/10/23 1008 57.2 kg (126 lb 1.7 oz)   02/09/23 1035 59 kg (130 lb)   01/19/23 1108 62.8 kg (138 lb 6.4 oz)         Estimated/Assessed Needs - Anthropometrics     Row Name 02/24/23 1036          Anthropometrics    Weight for Calculation 57.2 kg (126 lb 1.7 oz)        Estimated/Assessed Needs    Additional Documentation KCAL/KG (Group);Protein Requirements (Group);Fluid Requirements (Group)        KCAL/KG    KCAL/KG 35 Kcal/Kg (kcal)     35 Kcal/Kg (kcal) 2002        Protein Requirements    Weight Used For Protein Calculations 57.2 kg (126 lb 1.7 oz)     Est Protein Requirement Amount (gms/kg) 1.5 gm protein     Estimated Protein Requirements (gms/day) 85.8        Fluid Requirements    Fluid Requirements (mL/day) 1716     RDA Method (mL) 1716                  Labs/Medications        Pertinent Labs Reviewed.       Pertinent Medications HYDROcodone-acetaminophen     Nutrition Diagnosis        Nutrition Dx Problem 1 Increased nutrient needs related to increased nutrient needs due to catabolic disease as evidenced by physiological causes increasing nutrient needs.       Nutrition Intervention       RD Action Nutrition assessment    Discussion with pt via telephone:  Current nutritional state; current PO intake  Recent wt changes  Importance of wt maintenance at this time through adequate intake of nutrition (kcals,  protein) and fluids  (see below for more details)     Monitor/Evaluation       Monitor Per oncology nutrition protocol.     Comments:    Nutrition referral received 2/21/23 due to significant wt decline.  Per records, pt has lost 12# in 1 month (8.7% loss in 1 month), however when speaking to pt she reports the 138# in January 2023 must be inaccurate.  She reports weighing 126# for years and there have been no recent changes.  Pt does report mild dysphagia & odynophagia with all food consistencies or liquids.  She reports pn & soreness occur regardless of what she attempts to eat.  She reports to be trying to eat a variety of foods, including good protein sources.  Reviewed examples of high protein foods, briefly.  Peg consult scheduled for today with surgeon.  Tx plan unknown at this time, however pt reports she will have to have oral surgery prior to receiving tx (insertion of a plate in her jaw, which will require an inpatient visit post-surgery).    Offered to send pt recipes & further nutrition information, however she declined for now.  Explained Oncology RD will be contacting her closer to the date of her actual peg tube placement.    Electronically signed by:  Alexa Ghotra RD  02/24/23 10:37 EST

## 2023-02-27 ENCOUNTER — TELEPHONE (OUTPATIENT)
Dept: RADIATION ONCOLOGY | Facility: HOSPITAL | Age: 63
End: 2023-02-27
Payer: COMMERCIAL

## 2023-02-27 ENCOUNTER — TELEPHONE (OUTPATIENT)
Dept: NUTRITION | Facility: HOSPITAL | Age: 63
End: 2023-02-27
Payer: COMMERCIAL

## 2023-02-27 NOTE — TELEPHONE ENCOUNTER
Distress Screening Follow-Up    Date of Distress Screenin23    Location of Distress Screening: Rad onc and med onc    Distress Level: Initially a 5 decreased to a 3    Problems Indicated: Pain, sleep, fatigue, worry or anxiety, sadness or depression, loneliness, finances, treatment decision     Diagnosis: Squamous cell carcinoma of the base of tongue     Current Tx Plan: Pt to undergo teeth extractions and have a plate placed in her mouth and will then undergo concurrent chemo/radiation. She is scheduled for port-a-cath and prophylactic PEG tube placement on 23.     Previous Cancer Tx (if applicable): No history noted    Most Recent PHQ -2/PHQ-9 Score: 5    C-SSRS: Denied SI and screened negative on 23 and 23    Mood: Pt was pleasant, easily engaged in conversation via telephone and able to effectively communicate her thoughts and feelings in relation to her cancer diagnosis and treatment plan. Pt expressed interest in being referred to [facility] for psychiatry/medication management, disclosing experiencing depressive symptoms and increased crying. Provided emotional support throughout our conversation, utilizing empathy and validating and normalizing identified emotions. In addition to [facility], discussed opportunity to get pt connected to outpatient counseling, one-on-one oncology peer support and/or oncology support group. Pt did not express interest in these additional support services at this time.    Support System: Pt reports having a positive support system, including but not limited to her spouse and adult daughter.    Hobbies: Pt reports remaining busy with her 2 week old grandson that is currently staying with her while grandson's mother (pt's daughter) assists with pt's care. Pt reports this is her first grand baby.    Residential status/Who lives in the home: Pt resides in home with spouse in [facility] and pt's adult daughter and 2 week old  grandson are currently staying with them to assist with pt's care.    Home Care Needs: Pt is having a prophylactic PEG tube placed tomorrow. Educated pt she will receive teaching from post-op staff tomorrow regarding PEG tube care and daily BID water flushes and the supplies to administer these, however, discussed opportunity to arrange additional teaching with an oncology nurse or through home health care if needed. Pt reports her adult daughter will be living with her to assist with this care if needed. Due to this being a prophylactic placement, pt does not require teaching for administration of enteral nutrition and/or medications at this time.     Insurance: [employer] Employee    Finances: Pt reports she is retired and spouse is employed. Household is able to sufficiently meet their basic needs, however, pt expressed concerns regarding medical expenses. Pt reports she's already paid some of her medical bills and is on a monthly payment plan through the hospital. Advised pt she may apply for [employer] financial assistance program to see if household is eligible for assistance. Mailing pt this application per her request, along with contact information for the financial counselors department. OSW also sent a referral to [facility] to see if there's co-pay or patient assistance available with Cisplatin. Unfortunately, there are no grants available at this time and she suggested checking back in a month from now.    Transportation: Pt provides her own transportation, but daughter will be available if needed. Pt does not identify any transportation or travel expense concerns at this time. Educated pt that gas assistance may be available if this becomes a burden due to traveling from [facility].     Advance Care Planning: No directive on file    Resources/Referrals: Emotional support, [facility] psychiatry; Financial assistance - [facility] Program and financial  counselors, [facility] financial counselor regarding chemotherapy co-pay/patient assistance; care coordination - PEG tube care and education    Additional Comment: OSW contacted pt via telephone to f/u in regards to identified distress, introduce OSW role and assess for psychosocial needs. Pt is also scheduled for a prophylactic PEG tube placement tomorrow and OSW discussed education options available. Oncology RD has been in contact with pt already and will continue to monitor nutritional needs throughout treatment. OSW is referring pt to [facility] psychiatry for medication management to manage depressive symptoms. Also getting pt connected to financial resources that may assist with medical expenses. Provided pt with my direct number, encouraging OSW support remains available. Pt expressed gratitude.     Update 3/2/23: Received notification that pt is scheduled to be seen by Baldev Merlos at [facility] on 3/21/23 at 1300.     Update 4/7/23: OSW submitted another referral to [facility] to see if there's any assistance available at this time with the Cisplatin. Mrs. Serna begins treatment on Monday, 4/10/23. Shanell reports unfortunately there is still no assistance available at this time.

## 2023-02-27 NOTE — PROGRESS NOTES
Outpatient Nutrition Oncology Follow Up    Patient Name: Carina Serna  YOB: 1960  MRN: [id number]    Recommendation(s): No further recommendations at this time     Reason for Consult:  Peg tube placement (prophylactic) scheduled for tomorrow 2/28/23     Consult or Intervention:  Pt tube placement scheduled for tomorrow, 2/28.  Spoke to pt over the phone again today (initial conversation 2/24/23) to confirm she is still consuming solids/liquids PO and that the tube placement is for prophylactic purposes only.  Tube is being placed prior to upcoming dental extraction surgery.  As noted in 2/24 documentation, pt reports her wt has stayed stable at 126# for months and no changes (feels wt from January 2023 of 138# is inaccurate).  Pt reports she continues to experience odynophagia with all solids & liquids, however managing to eat regardless.  Denies choking issues upon consumption of solids / liquids.  Mailed information out to pt today to help w/ maximizing her nutritional intake until radiation & chemotherapy can begin.    Handouts mailed:    Sore Mouth & Throat  Recipes to Help with Sore Mouth & Throat  How to Make a High Calorie Milkshake  Milkshake Recipes  List of Commercial Oral Nutrition Shakes   Coupons for Ensure & Boost products.     Email sent 2/27/23 to post-op staff and oncology team to communicate the feeding tube is prophylactic and to request education after placement.    Nutrition Diagnosis: Increased nutrient needs related to increased nutrient needs due to catabolic disease as evidenced by physiological causes increasing nutrient needs.    Plan: Will follow up per oncology nutrition protocol

## 2023-02-28 ENCOUNTER — ANESTHESIA EVENT (OUTPATIENT)
Dept: PERIOP | Facility: HOSPITAL | Age: 63
End: 2023-02-28
Payer: COMMERCIAL

## 2023-02-28 ENCOUNTER — APPOINTMENT (OUTPATIENT)
Dept: GENERAL RADIOLOGY | Facility: HOSPITAL | Age: 63
End: 2023-02-28
Payer: COMMERCIAL

## 2023-02-28 ENCOUNTER — HOSPITAL ENCOUNTER (OUTPATIENT)
Facility: HOSPITAL | Age: 63
Setting detail: HOSPITAL OUTPATIENT SURGERY
Discharge: HOME | End: 2023-02-28
Attending: SURGERY | Admitting: SURGERY
Payer: COMMERCIAL

## 2023-02-28 ENCOUNTER — ANESTHESIA (OUTPATIENT)
Dept: PERIOP | Facility: HOSPITAL | Age: 63
End: 2023-02-28
Payer: COMMERCIAL

## 2023-02-28 VITALS
WEIGHT: 125.22 LBS | SYSTOLIC BLOOD PRESSURE: 147 MMHG | BODY MASS INDEX: 20.86 KG/M2 | HEIGHT: 65 IN | HEART RATE: 67 BPM | DIASTOLIC BLOOD PRESSURE: 72 MMHG | OXYGEN SATURATION: 99 % | RESPIRATION RATE: 16 BRPM | TEMPERATURE: 97 F

## 2023-02-28 DIAGNOSIS — C02.9 PRIMARY TONGUE SQUAMOUS CELL CARCINOMA: Primary | ICD-10-CM

## 2023-02-28 DIAGNOSIS — C01 PRIMARY SQUAMOUS CELL CARCINOMA OF BASE OF TONGUE: ICD-10-CM

## 2023-02-28 DIAGNOSIS — R13.10 ODYNOPHAGIA: ICD-10-CM

## 2023-02-28 PROCEDURE — 0 LIDOCAINE 1 % SOLUTION: Performed by: SURGERY

## 2023-02-28 PROCEDURE — 77001 FLUOROGUIDE FOR VEIN DEVICE: CPT | Performed by: SURGERY

## 2023-02-28 PROCEDURE — 76937 US GUIDE VASCULAR ACCESS: CPT | Performed by: SURGERY

## 2023-02-28 PROCEDURE — 71045 X-RAY EXAM CHEST 1 VIEW: CPT

## 2023-02-28 PROCEDURE — 25010000002 MIDAZOLAM PER 1MG: Performed by: ANESTHESIOLOGY

## 2023-02-28 PROCEDURE — C1788 PORT, INDWELLING, IMP: HCPCS | Performed by: SURGERY

## 2023-02-28 PROCEDURE — 76000 FLUOROSCOPY <1 HR PHYS/QHP: CPT

## 2023-02-28 PROCEDURE — 25010000002 DEXAMETHASONE PER 1 MG

## 2023-02-28 PROCEDURE — 25010000002 CEFAZOLIN PER 500 MG: Performed by: SURGERY

## 2023-02-28 PROCEDURE — C1769 GUIDE WIRE: HCPCS | Performed by: SURGERY

## 2023-02-28 PROCEDURE — 25010000002 FENTANYL CITRATE (PF) 50 MCG/ML SOLUTION

## 2023-02-28 PROCEDURE — 25010000002 ONDANSETRON PER 1 MG

## 2023-02-28 PROCEDURE — 43246 EGD PLACE GASTROSTOMY TUBE: CPT | Performed by: SURGERY

## 2023-02-28 PROCEDURE — 36561 INSERT TUNNELED CV CATH: CPT | Performed by: SURGERY

## 2023-02-28 PROCEDURE — 25010000002 PROPOFOL 10 MG/ML EMULSION

## 2023-02-28 PROCEDURE — 25010000002 HEPARIN (PORCINE) PER 1000 UNITS: Performed by: SURGERY

## 2023-02-28 DEVICE — PRT INTRO VASC/INTERV VORTEX FILL/HL DETACH/POLYURET/CATH 8F: Type: IMPLANTABLE DEVICE | Site: INTERNAL JUGULAR | Status: FUNCTIONAL

## 2023-02-28 RX ORDER — LIDOCAINE HYDROCHLORIDE 10 MG/ML
INJECTION, SOLUTION INFILTRATION; PERINEURAL AS NEEDED
Status: DISCONTINUED | OUTPATIENT
Start: 2023-02-28 | End: 2023-02-28 | Stop reason: HOSPADM

## 2023-02-28 RX ORDER — HYDROCODONE BITARTRATE AND ACETAMINOPHEN 5; 325 MG/1; MG/1
1 TABLET ORAL EVERY 6 HOURS PRN
Qty: 8 TABLET | Refills: 0 | Status: SHIPPED | OUTPATIENT
Start: 2023-02-28 | End: 2023-03-30

## 2023-02-28 RX ORDER — MIDAZOLAM HYDROCHLORIDE 2 MG/2ML
2 INJECTION, SOLUTION INTRAMUSCULAR; INTRAVENOUS ONCE
Status: COMPLETED | OUTPATIENT
Start: 2023-02-28 | End: 2023-02-28

## 2023-02-28 RX ORDER — SODIUM CHLORIDE, SODIUM LACTATE, POTASSIUM CHLORIDE, CALCIUM CHLORIDE 600; 310; 30; 20 MG/100ML; MG/100ML; MG/100ML; MG/100ML
9 INJECTION, SOLUTION INTRAVENOUS CONTINUOUS PRN
Status: DISCONTINUED | OUTPATIENT
Start: 2023-02-28 | End: 2023-02-28 | Stop reason: HOSPADM

## 2023-02-28 RX ORDER — ONDANSETRON 2 MG/ML
INJECTION INTRAMUSCULAR; INTRAVENOUS AS NEEDED
Status: DISCONTINUED | OUTPATIENT
Start: 2023-02-28 | End: 2023-02-28 | Stop reason: SURG

## 2023-02-28 RX ORDER — HEPARIN SODIUM 5000 [USP'U]/ML
INJECTION, SOLUTION INTRAVENOUS; SUBCUTANEOUS AS NEEDED
Status: DISCONTINUED | OUTPATIENT
Start: 2023-02-28 | End: 2023-02-28 | Stop reason: HOSPADM

## 2023-02-28 RX ORDER — PHENYLEPHRINE HCL IN 0.9% NACL 1 MG/10 ML
SYRINGE (ML) INTRAVENOUS AS NEEDED
Status: DISCONTINUED | OUTPATIENT
Start: 2023-02-28 | End: 2023-02-28 | Stop reason: SURG

## 2023-02-28 RX ORDER — MEPERIDINE HYDROCHLORIDE 25 MG/ML
12.5 INJECTION INTRAMUSCULAR; INTRAVENOUS; SUBCUTANEOUS
Status: DISCONTINUED | OUTPATIENT
Start: 2023-02-28 | End: 2023-02-28 | Stop reason: HOSPADM

## 2023-02-28 RX ORDER — DEXAMETHASONE SODIUM PHOSPHATE 4 MG/ML
INJECTION, SOLUTION INTRA-ARTICULAR; INTRALESIONAL; INTRAMUSCULAR; INTRAVENOUS; SOFT TISSUE AS NEEDED
Status: DISCONTINUED | OUTPATIENT
Start: 2023-02-28 | End: 2023-02-28 | Stop reason: SURG

## 2023-02-28 RX ORDER — PROPOFOL 10 MG/ML
VIAL (ML) INTRAVENOUS AS NEEDED
Status: DISCONTINUED | OUTPATIENT
Start: 2023-02-28 | End: 2023-02-28 | Stop reason: SURG

## 2023-02-28 RX ORDER — FENTANYL CITRATE 50 UG/ML
INJECTION, SOLUTION INTRAMUSCULAR; INTRAVENOUS AS NEEDED
Status: DISCONTINUED | OUTPATIENT
Start: 2023-02-28 | End: 2023-02-28 | Stop reason: SURG

## 2023-02-28 RX ORDER — LIDOCAINE HYDROCHLORIDE 20 MG/ML
INJECTION, SOLUTION EPIDURAL; INFILTRATION; INTRACAUDAL; PERINEURAL AS NEEDED
Status: DISCONTINUED | OUTPATIENT
Start: 2023-02-28 | End: 2023-02-28 | Stop reason: SURG

## 2023-02-28 RX ORDER — OXYCODONE HYDROCHLORIDE 5 MG/1
5 TABLET ORAL
Status: DISCONTINUED | OUTPATIENT
Start: 2023-02-28 | End: 2023-02-28 | Stop reason: HOSPADM

## 2023-02-28 RX ORDER — PROMETHAZINE HYDROCHLORIDE 25 MG/1
25 SUPPOSITORY RECTAL ONCE AS NEEDED
Status: DISCONTINUED | OUTPATIENT
Start: 2023-02-28 | End: 2023-02-28 | Stop reason: HOSPADM

## 2023-02-28 RX ORDER — BUPIVACAINE HYDROCHLORIDE 2.5 MG/ML
INJECTION, SOLUTION EPIDURAL; INFILTRATION; INTRACAUDAL AS NEEDED
Status: DISCONTINUED | OUTPATIENT
Start: 2023-02-28 | End: 2023-02-28 | Stop reason: HOSPADM

## 2023-02-28 RX ORDER — GLYCOPYRROLATE 0.2 MG/ML
0.2 INJECTION INTRAMUSCULAR; INTRAVENOUS
Status: COMPLETED | OUTPATIENT
Start: 2023-02-28 | End: 2023-02-28

## 2023-02-28 RX ORDER — BUPIVACAINE HCL/0.9 % NACL/PF 0.1 %
2 PLASTIC BAG, INJECTION (ML) EPIDURAL ONCE
Status: COMPLETED | OUTPATIENT
Start: 2023-02-28 | End: 2023-02-28

## 2023-02-28 RX ORDER — DEXMEDETOMIDINE HYDROCHLORIDE 100 UG/ML
INJECTION, SOLUTION INTRAVENOUS AS NEEDED
Status: DISCONTINUED | OUTPATIENT
Start: 2023-02-28 | End: 2023-02-28 | Stop reason: SURG

## 2023-02-28 RX ORDER — PROMETHAZINE HYDROCHLORIDE 12.5 MG/1
25 TABLET ORAL ONCE AS NEEDED
Status: DISCONTINUED | OUTPATIENT
Start: 2023-02-28 | End: 2023-02-28 | Stop reason: HOSPADM

## 2023-02-28 RX ORDER — ONDANSETRON 2 MG/ML
4 INJECTION INTRAMUSCULAR; INTRAVENOUS ONCE AS NEEDED
Status: DISCONTINUED | OUTPATIENT
Start: 2023-02-28 | End: 2023-02-28 | Stop reason: HOSPADM

## 2023-02-28 RX ORDER — ROCURONIUM BROMIDE 10 MG/ML
INJECTION, SOLUTION INTRAVENOUS AS NEEDED
Status: DISCONTINUED | OUTPATIENT
Start: 2023-02-28 | End: 2023-02-28 | Stop reason: SURG

## 2023-02-28 RX ADMIN — FENTANYL CITRATE 50 MCG: 50 INJECTION, SOLUTION INTRAMUSCULAR; INTRAVENOUS at 14:43

## 2023-02-28 RX ADMIN — Medication 200 MCG: at 15:14

## 2023-02-28 RX ADMIN — SUGAMMADEX 180 MG: 100 INJECTION, SOLUTION INTRAVENOUS at 15:27

## 2023-02-28 RX ADMIN — GLYCOPYRROLATE 0.2 MG: 0.2 INJECTION INTRAMUSCULAR; INTRAVENOUS at 14:23

## 2023-02-28 RX ADMIN — DEXAMETHASONE SODIUM PHOSPHATE 8 MG: 4 INJECTION, SOLUTION INTRA-ARTICULAR; INTRALESIONAL; INTRAMUSCULAR; INTRAVENOUS; SOFT TISSUE at 14:43

## 2023-02-28 RX ADMIN — DEXMEDETOMIDINE HYDROCHLORIDE 16 MCG: 100 INJECTION, SOLUTION, CONCENTRATE INTRAVENOUS at 14:45

## 2023-02-28 RX ADMIN — Medication 100 MCG: at 14:59

## 2023-02-28 RX ADMIN — SODIUM CHLORIDE, POTASSIUM CHLORIDE, SODIUM LACTATE AND CALCIUM CHLORIDE: 600; 310; 30; 20 INJECTION, SOLUTION INTRAVENOUS at 14:27

## 2023-02-28 RX ADMIN — Medication 2 G: at 14:41

## 2023-02-28 RX ADMIN — ROCURONIUM BROMIDE 40 MG: 10 INJECTION, SOLUTION INTRAVENOUS at 14:36

## 2023-02-28 RX ADMIN — ONDANSETRON 4 MG: 2 INJECTION INTRAMUSCULAR; INTRAVENOUS at 14:56

## 2023-02-28 RX ADMIN — LIDOCAINE HYDROCHLORIDE 100 MG: 20 INJECTION, SOLUTION EPIDURAL; INFILTRATION; INTRACAUDAL; PERINEURAL at 14:36

## 2023-02-28 RX ADMIN — FENTANYL CITRATE 25 MCG: 50 INJECTION, SOLUTION INTRAMUSCULAR; INTRAVENOUS at 14:36

## 2023-02-28 RX ADMIN — MIDAZOLAM HYDROCHLORIDE 2 MG: 1 INJECTION, SOLUTION INTRAMUSCULAR; INTRAVENOUS at 14:23

## 2023-02-28 RX ADMIN — DEXMEDETOMIDINE HYDROCHLORIDE 8 MCG: 100 INJECTION, SOLUTION, CONCENTRATE INTRAVENOUS at 14:50

## 2023-02-28 RX ADMIN — PROPOFOL 140 MG: 10 INJECTION, EMULSION INTRAVENOUS at 14:36

## 2023-02-28 RX ADMIN — PROPOFOL 50 MCG/KG/MIN: 10 INJECTION, EMULSION INTRAVENOUS at 14:44

## 2023-02-28 RX ADMIN — FENTANYL CITRATE 25 MCG: 50 INJECTION, SOLUTION INTRAMUSCULAR; INTRAVENOUS at 15:18

## 2023-02-28 NOTE — ANESTHESIA POSTPROCEDURE EVALUATION
Patient: Carina Serna    Procedure Summary     Date: 02/28/23 Room / Location: [facility] OR 02 / [facility] MAIN OR    Anesthesia Start: 1427 Anesthesia Stop: 1544    Procedures:       INSERTION OF PORTACATH ,insertion of percutaneoous endoscopic gastrostomy tube      PERCUTANEOUS ENDOSCOPIC GASTROSTOMY TUBE INSERTION (Abdomen) Diagnosis:       Primary squamous cell carcinoma of base of tongue (HCC)      Odynophagia      (Primary squamous cell carcinoma of base of tongue (HCC) [C01])      (Odynophagia [R13.10])    Surgeons: Frank Ellington MD Provider: Reyes, Mirabelle, DO    Anesthesia Type: general ASA Status: 3          Anesthesia Type: general    Vitals  Vitals Value Taken Time   /78 02/28/23 1610   Temp 36.1 °C (97 °F) 02/28/23 1605   Pulse 82 02/28/23 1611   Resp 16 02/28/23 1605   SpO2 97 % 02/28/23 1611   Vitals shown include unvalidated device data.        Post Anesthesia Care and Evaluation    Patient location during evaluation: bedside  Patient participation: complete - patient participated  Level of consciousness: awake  Pain management: adequate    Airway patency: patent  Anesthetic complications: No anesthetic complications  PONV Status: none  Cardiovascular status: acceptable and stable  Respiratory status: acceptable  Hydration status: acceptable    Comments: An Anesthesiologist personally participated in the most demanding procedures (including induction and emergence if applicable) in the anesthesia plan, monitored the course of anesthesia administration at frequent intervals and remained physically present and available for immediate diagnosis and treatment of emergencies.

## 2023-02-28 NOTE — ANESTHESIA PREPROCEDURE EVALUATION
Anesthesia Evaluation     Patient summary reviewed and Nursing notes reviewed   history of anesthetic complications: PONV  NPO Solid Status: > 8 hours  NPO Liquid Status: > 2 hours           Airway   Mallampati: I  TM distance: >3 FB  Neck ROM: full  No difficulty expected  Dental    (+) poor dentition        Pulmonary     breath sounds clear to auscultation  (+) a smoker Former, decreased breath sounds,   Cardiovascular - negative cardio ROS and normal exam  Exercise tolerance: good (4-7 METS)    ECG reviewed  Rhythm: regular  Rate: normal      ROS comment: Sinus rhythm  Nonspecific T abnormalities, lateral leads    Neuro/Psych- negative ROS  GI/Hepatic/Renal/Endo - negative ROS     ROS Comment: odynophagia    Musculoskeletal     (+) neck pain,   Abdominal    Substance History - negative use     OB/GYN negative ob/gyn ROS         Other      history of cancer (squamous cell ca of the tongue base) active    ROS/Med Hx Other: PAT Nursing Notes unavailable.                 Anesthesia Plan    ASA 3     general     (Patient understands anesthesia not responsible for dental damage.)  intravenous induction     Anesthetic plan, risks, benefits, and alternatives have been provided, discussed and informed consent has been obtained with: patient.  Pre-procedure education provided  Use of blood products discussed with patient  Consented to blood products.   Plan discussed with CRNA.        CODE STATUS:

## 2023-03-01 LAB — QT INTERVAL: 374 MS

## 2023-03-02 ENCOUNTER — TELEPHONE (OUTPATIENT)
Dept: ONCOLOGY | Facility: HOSPITAL | Age: 63
End: 2023-03-02

## 2023-03-02 NOTE — TELEPHONE ENCOUNTER
Prophylactic Placement    - Did you receive education and teaching from the post-op staff? N  -  - Were you made aware you need to lush your PEG tube with 60ml BID while not in use? N - The pt states that she is unaware that she needs to flush her tube. The pt thought that it would not be used until needed for tube feedings. This nurse reviewed the importance to flush the tube with 60ml of water BID to maintain tube patency. The pt voices that she understands and does not have any further questions. This nurse instructed the pt to call this nurse with any future questions or problems. The pt voiced understanding.   -   - Did you receive an irrigation tray/kit for these water flushes? Y  -  - Do you feel comfortable and confident in providing your PEG tube care at this time? Y  -  - Do you need additional education and support arranged through home health? N  -  - if yes, do you have a preferred home health agency?      Immediate Use Placement       - Did you receive education and teaching from the post-op staff?  -  - Did you receive an irrigation tray/kit for these water flushes?  -  - Has home health been in contact with you to provide additional education and support?  -  - Has the Oncology RD been in contact with you to discuss enteral nutrition recommendations?  -  - Has the nutrition vendor been in contact with you to deliver enteral nutrition and supplies?  -

## 2023-03-06 ENCOUNTER — HOSPITAL ENCOUNTER (OUTPATIENT)
Dept: PET IMAGING | Facility: HOSPITAL | Age: 63
Discharge: HOME | End: 2023-03-06
Payer: COMMERCIAL

## 2023-03-06 DIAGNOSIS — K14.8 MASS OF TONGUE: ICD-10-CM

## 2023-03-06 DIAGNOSIS — C13.9 SQUAMOUS CELL CARCINOMA OF HYPOPHARYNX: ICD-10-CM

## 2023-03-06 DIAGNOSIS — C13.8 SQUAMOUS CELL CARCINOMA OF OVERLAPPING SITES OF HYPOPHARYNX: ICD-10-CM

## 2023-03-06 PROCEDURE — 78815 PET IMAGE W/CT SKULL-THIGH: CPT

## 2023-03-06 PROCEDURE — 0 FLUDEOXYGLUCOSE F18 SOLUTION: Performed by: RADIOLOGY

## 2023-03-06 PROCEDURE — A9552 F18 FDG: HCPCS | Performed by: RADIOLOGY

## 2023-03-06 RX ADMIN — FLUDEOXYGLUCOSE F18 1 DOSE: 300 INJECTION INTRAVENOUS at 12:23

## 2023-03-17 ENCOUNTER — HOSPITAL ENCOUNTER (OUTPATIENT)
Dept: RADIATION ONCOLOGY | Facility: HOSPITAL | Age: 63
Setting detail: RADIATION/ONCOLOGY SERIES
End: 2023-03-17
Payer: COMMERCIAL

## 2023-03-20 ENCOUNTER — PATIENT OUTREACH (OUTPATIENT)
Dept: ONCOLOGY | Facility: HOSPITAL | Age: 63
End: 2023-03-20
Payer: COMMERCIAL

## 2023-03-28 ENCOUNTER — OFFICE VISIT (OUTPATIENT)
Dept: ONCOLOGY | Facility: HOSPITAL | Age: 63
End: 2023-03-28
Payer: COMMERCIAL

## 2023-03-28 VITALS
TEMPERATURE: 97.6 F | HEIGHT: 65 IN | OXYGEN SATURATION: 100 % | SYSTOLIC BLOOD PRESSURE: 155 MMHG | RESPIRATION RATE: 16 BRPM | HEART RATE: 74 BPM | DIASTOLIC BLOOD PRESSURE: 70 MMHG | BODY MASS INDEX: 19.72 KG/M2 | WEIGHT: 118.39 LBS

## 2023-03-28 DIAGNOSIS — Z71.9 ENCOUNTER FOR EDUCATION: ICD-10-CM

## 2023-03-28 DIAGNOSIS — K13.79 MOUTH PAIN: ICD-10-CM

## 2023-03-28 DIAGNOSIS — C02.9 PRIMARY TONGUE SQUAMOUS CELL CARCINOMA: Primary | ICD-10-CM

## 2023-03-28 PROCEDURE — G0463 HOSPITAL OUTPT CLINIC VISIT: HCPCS | Performed by: NURSE PRACTITIONER

## 2023-03-28 PROCEDURE — 99215 OFFICE O/P EST HI 40 MIN: CPT | Performed by: NURSE PRACTITIONER

## 2023-03-28 RX ORDER — CHLORHEXIDINE GLUCONATE 0.12 MG/ML
RINSE ORAL
COMMUNITY
Start: 2023-03-10

## 2023-03-28 RX ORDER — IBUPROFEN 600 MG/1
TABLET ORAL
COMMUNITY
Start: 2023-03-10

## 2023-03-28 NOTE — PROGRESS NOTES
CHEMOTHERAPY PREPARATION    Carina Serna  [id number]  1960    Chief Complaint:   Chemo Education    History of present illness:  Carina Serna is a 63 y.o. year old female who is here today with her  for chemotherapy preparation and needs assessment.  The patient has been diagnosed with tongue cancer and is scheduled to begin treatment with weekly Cisplatin and radiation x 7 weeks.     Oncology History:    Oncology/Hematology History Overview Note   1/19/23: Referred to Dr. Escobar for evaluation after several months of right sided sore throat. She underwent flexible fiberoptic laryngoscopy revealing a mass at the right lateral base of tongue and glossotonsillar sulcus.  Pathology from biopsy revealed HPV negative, moderately differentiated squamous cell carcinoma.      1/25/2023: CT scan of the soft tissue neck demonstrated a 2.7 x 2.3 cm abnormal soft tissue density likely representing neoplasm centered at the lateral right oropharynx with extension toward the lingual tonsil.  There is a moderately suspicious rounded right level 2 lymph node measuring 0.7 cm in the short axis.  CT scan of the chest revealed a subcarinal lymph node at the upper limit of normal for size.       Primary tongue squamous cell carcinoma (HCC)   1/19/2023 Initial Diagnosis    Primary tongue squamous cell carcinoma (HCC)     2/24/2023 -  Chemotherapy    OP HEAD & NECK CISplatin (Weekly) + XRT         The current medication list and allergy list were reviewed and reconciled.     Past Medical History, Past Surgical History, Social History, Family History have been reviewed and are without significant changes except as mentioned.    Review of Systems:    Review of Systems   HENT: Positive for sore throat and trouble swallowing.    Eyes: Negative.    Respiratory: Negative.    Cardiovascular: Negative.    Gastrointestinal: Negative.    Endocrine: Negative.    Genitourinary: Negative.    Musculoskeletal: Negative.     Skin: Negative.    Allergic/Immunologic: Negative.    Neurological: Negative.    Hematological: Negative.    Psychiatric/Behavioral: The patient is nervous/anxious.        Physical Exam:    Physical Exam     General: well appearing, in no acute distress  Cardiovascular: regular rate and rhythm, no murmurs noted  Lungs: clear to auscultation bilaterally, respirations even and unlabored  Extremities: no lower extremity edema  Skin: no rashes, lesions, bruising, or petechiae  Psych: Mood is stable  Abdomen: feeding tube in place.     3/18/2023: reviewed.  GRACY Rivera.     Vitals:    03/28/23 1441   BP: 155/70   Pulse: 74   Resp: 16   Temp: 97.6 °F (36.4 °C)   SpO2: 100%     Vitals:    03/28/23 1441   PainSc:   5   PainLoc: Throat          ECOG: {findings; ecog performance status:[id number]            NEEDS ASSESSMENTS    VAD Assessment  The patient and I discussed planned intravenous chemotherapy as well as other IV treatments that are often needed throughout the course of treatment. These may include, but are not limited to blood transfusions, antibiotics, and IV hydration. The vasculature does not appear to be adequate for multiple peripheral IVs throughout their treatment course. Discussed risks and benefits of VADs. The patient would like to pursue Port-A-Cath insertion prior to initiation of treatment.       Palliative Care  The patient and I discussed palliative care services. Palliative care is not the same as Hospice care. This is specialized medical care for people living with serious illness with the goal of improving quality of life for the patient and their family. [facility] has partnered with [facility] to offer our patients outpatient palliative care early along with their treatment to assist in coordination of care, symptom management, pain management, and medical decision making.  Oncology criteria for palliative care referral is not met at this time. The patient is not interested in a palliative care  consultation.     Additional Referral needs  Nutrition consult for feeding tube and cans of feeding / calories.       CHEMOTHERAPY EDUCATION    Booklets Given: Chemotherapy and You [x]  Eating Hints [x]    Sexuality/Fertility Books []      Chemotherapy/Biotherapy Education Sheets: (list all that apply)  nausea management, acid reflux management, diarrhea management, Cancer resourse contacts information, skin and mouth care and vaccination information                                                                                                                                                                 Chemotherapy Regimen:   Treatment Plans     Name Type Hold Status Plan Dates Plan Provider       Active    OP HEAD & NECK CISplatin (Weekly) + XRT ONCOLOGY TREATMENT On Automatic Hold  2/23/2023 - Present Sarmad Cohen MD                   TOPICS EDUCATION PROVIDED COMMENTS   ANEMIA:  role of RBC, cause, s/s, ways to manage, role of transfusion [x]    THROMBOCYTOPENIA:  role of platelet, cause, s/s, ways to prevent bleeding, things to avoid, when to seek help [x]    NEUTROPENIA:  role of WBC, cause, infection precautions, s/s of infection, when to call MD [x]    NUTRITION & APPETITE CHANGES:  importance of maintaining healthy diet & weight, ways to manage to improve intake, dietary consult, exercise regimen [x]    DIARRHEA:  causes, s/s of dehydration, ways to manage, dietary changes, when to call MD [x]    CONSTIPATION:  causes, ways to manage, dietary changes, when to call MD [x]    NAUSEA & VOMITING:  cause, use of antiemetics, dietary changes, when to call MD [x]    MOUTH SORES:  causes, oral care, ways to manage [x]    ALOPECIA:  cause, ways to manage, resources [x]    INFERTILITY & SEXUALITY:  causes, fertility preservation options, sexuality changes, ways to manage, importance of birth control [x]    NERVOUS SYSTEM CHANGES:  causes, s/s, neuropathies, cognitive changes, ways to manage [x]    PAIN:   causes, ways to manage [x]    SKIN & NAIL CHANGES:  cause, s/s, ways to manage [x]    ORGAN TOXICITIES:  cause, s/s, need for diagnostic tests, labs, when to notify MD [x]    SURVIVORSHIP:  distress, distress assessment, secondary malignancies, early/late effects, follow-up, social issues, social support [x]    HOME CARE:  use of spill kits, storing of PO chemo, how to manage bodily fluids [x]    MISCELLANEOUS:  drug interactions, administration, vesicant, et [x]        Assessment and Plan:    Diagnoses and all orders for this visit:    1. Primary tongue squamous cell carcinoma (HCC) (Primary)  -     Ambulatory Referral to OP ONC Nutrition Services  -     diphenhydrAMINE 12.5 MG/5ML elixir 20 mL, aluminum-magnesium hydroxide-simethicone 400-400-40 MG/5ML suspension 20 mL, Lidocaine Viscous HCl 2 % solution 20 mL; Swish and spit 15 mL Every 4 (Four) Hours As Needed for Mucositis.  Dispense: 300 mL; Refill: 2    2. Encounter for education    3. Mouth pain          The patient and I have reviewed their cancer diagnosis and scheduled treatment plan. Needs assessment was completed including genetics, psychosocial needs, barriers to care, VAD evaluation, advanced care planning, and palliative care services. Referrals have been ordered as appropriate based upon our evaluation and patient desires.     Chemotherapy teaching was also completed today as documented above. Adequate time was given to answer all questions to her satisfaction. Patient and family are aware of their care team members and contact information if they have questions or problems throughout the treatment course. The patient is adequately prepared to begin treatment as scheduled.     Reviewed with patient education regarding EMLA cream, dexamethasone and Zofran prescriptions sent to pharmacy.       I advised the patient that she can take Tylenol  as needed for aches/pains related to cancer/treatment.  I also advised that her can use Senokot or MiraLAX as  needed for constipation or Imodium as needed for diarrhea.       I reviewed with the patient the care team members. I also reviewed the option of the urgent care clinic through our oncology office for evaluation and management of symptoms related to treatment.    I spent 40 minutes caring for Carina on this date of service. This time includes time spent by me in the following activities: preparing for the visit, reviewing tests, obtaining and/or reviewing a separately obtained history, performing a medically appropriate examination and/or evaluation, counseling and educating the patient/family/caregiver, ordering medications, tests, or procedures, referring and communicating with other health care professionals, documenting information in the medical record, independently interpreting results and communicating that information with the patient/family/caregiver and care coordination.     Luisa Nicholas APRN

## 2023-03-29 ENCOUNTER — TELEPHONE (OUTPATIENT)
Dept: NUTRITION | Facility: HOSPITAL | Age: 63
End: 2023-03-29
Payer: COMMERCIAL

## 2023-03-29 NOTE — PROGRESS NOTES
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   • Odynophagia R13.10         Anthropometrics     Row Name 03/29/23 1129          Anthropometrics    Weight for Calculation 53.7 kg (118 lb 6.2 oz)                     Weight Hx  Wt Readings from Last 30 Encounters:   03/28/23 1441 53.7 kg (118 lb 6.2 oz)   02/28/23 1302 56.8 kg (125 lb 3.5 oz)   02/27/23 1000 57.2 kg (126 lb 1.7 oz)   02/24/23 1541 57.2 kg (126 lb)   02/24/23 1340 57.5 kg (126 lb 12.2 oz)   02/21/23 1308 57.2 kg (126 lb 1.7 oz)   02/10/23 1008 57.2 kg (126 lb 1.7 oz)   02/09/23 1035 59 kg (130 lb)   01/19/23 1108 62.8 kg (138 lb 6.4 oz)         Estimated/Assessed Needs - Anthropometrics     Row Name 03/29/23 1129          Anthropometrics    Weight for Calculation 53.7 kg (118 lb 6.2 oz)        Estimated/Assessed Needs    Additional Documentation KCAL/KG (Group);Protein Requirements (Group);Fluid Requirements (Group)        KCAL/KG    KCAL/KG 35 Kcal/Kg (kcal);40 Kcal/Kg (kcal)     35 Kcal/Kg (kcal) 1879.5     40 Kcal/Kg (kcal) 2148        Protein Requirements    Weight Used For Protein Calculations 53.7 kg (118 lb 6.2 oz)     Est Protein Requirement Amount (gms/kg) 1.5 gm protein     Estimated Protein Requirements (gms/day) 80.55        Fluid Requirements    Fluid Requirements (mL/day) 4705-9629 ml     RDA Method (mL) 30 ml/kg - 1 ml/kcal                  Labs/Medications        Pertinent Labs Reviewed.       Pertinent Medications  HYDROcodone-acetaminophen, OLANZapine, chlorhexidine, (diphenhydrAMINE 12.5 MG/5ML elixir 20 mL, aluminum-magnesium hydroxide-simethicone 400-400-40 MG/5ML suspension 20 mL, Lidocaine Viscous HCl 2 % solution 20 mL), ibuprofen, and ondansetron     Physical Findings        Malnutrition Severity Assessment     Row Name 03/29/23 1129          Malnutrition Severity Assessment    Malnutrition Type Chronic Disease - Related Malnutrition        Insufficient Energy Intake     Insufficient Energy Intake Findings Severe     Insufficient Energy Intake  <50% of est. energy requirement for >or equal to 1 month        Unintentional Weight Loss     Unintentional Weight Loss Findings Severe  6.3% loss in 1 month     Unintentional Weight Loss  Weight loss of 5% in one month        Criteria Met (Must meet criteria for severity in at least 2 of these categories: M Wasting, Fat Loss, Fluid, Secondary Signs, Wt. Status, Intake)    Patient meets criteria for  Severe Malnutrition                  Current Nutrition Orders & Evaluation of Intake       Oral Nutrition     Current PO Diet Very soft or mashed foods (likely minimal kcals, minimal proteins)   Supplement      Nutrition Diagnosis        Nutrition Dx Problem 1 Severe malnutrition related to Inability to consume sufficient energy as evidenced by physiological causes increasing nutrient needs., hypermetabolic state., inadequate energy intake., decreased appetite., patient report. and 6.3% wt decline X 1 month, <50% EEE X 1 month.       Nutrition Intervention       RD Action Reassessment of nutritional needs & nutritional status    Discussion with pt via phone regarding:  Current PO intake, including ONS  Ongoing nutrition-impact symptoms (odynophagia, chewing issues / pn)  Tips for maximizing nutrition   Recommendation for initiating peg tube use for feeds    To be given to pt tomorrow when in radiation oncology:  EN samples of Isosource 1.5 (1 case of 24)  ENfit syringes  EN  suggestions:  3-5 cans Isosource 1.5 daily via syringe + appropriate h20 flushes     Monitor/Evaluation       Monitor Per oncology nutrition protocol.     Comments:    Initial encounter w/ pt via phone 2/24/23 and again on 2/27/23 (please refer to Oncology RD documentation from those dates).  Multiple interventions, discussions, recipes have been given to pt during both encounters.  S/P peg placement (initiall for prophylactic reasons, prior to tx) on 2/28/23.  Pt had lost wt prior to her Ca dx:  ~10#, however pt had also expressed that a wt obtained of 138# in January 2023 was inaccurate.  She had reported her UBW as 126#, which was maintained around the time of her peg placement.  Wt was maintained well until she had dental extraction surgery, which included a metal plate being inserted as well. As noted, on 2/27/23, Oncology RD provided multiple high kcal, high protein recipes for pureed foods and milkshakes, lists and recommendations of commercial ONS to be consuming, coupons for ONS, etc.  Pt has lost 6.3% body wt in the past month.  Total wt decline of 14.5% in 2-3 months.  She reports ongoing dysphagia & difficulty chewing foods, however reports an improvement in the past few days.  She has been consuming chicken noodle soup and other vegetables (beans) blended in her .  Encouraged pt to maximize her nutritional intake by consuming cream-like soups, consuming pureed recipes that have added fats / proteins, and to consume ONS regularly.  Pt was receptive, but feels like using her peg tube would be more practical for her at this time instead of drinking ONS.  Pt agreeable to begin syringe feeds and reports she has good support from her  and daughter- declined home health services.  She feels like giving a bolus feeding through the syringe is something she is comfortable doing and does not need further EN teaching at this time.    Explained to pt, for supplemental feeds she will need 1 can  Isosource 1.5 given 3-4 X day to meet ~60-80% of nutritional needs.  Suggest 120 mL h20 flush given before & after each feeding TID.  To meet 100% of pt's nutritional needs, suggest Isosource 1.5, total of 5 cans daily + 100 mL h20 flush given before & after each feeding 5 X day.    Pt has a f/u with radiation oncology, CT sim for radiation therapy, and speech therapy evaluation tomorrow 3/30/23.  Explained Oncology RD will provide the above supplies until a vendor can be established.  Will send referral for EN supplies / formula to appropriate vendor.    Addendum 3/30/23:  Provided pt with a case of EN formula samples (Isosource 1.5), 2 ENfit syringes, instructions for administering bolus feeds + amount of feeds to administer (1 can 3-4 X day to meet 60-80% of needs; 5 cans needed for 100% nutrition).  To see speech therapists after her CT sim.  Will send order / referral for EN supplies & formula once documentation available.    Electronically signed by:  Alexa Ghotra RD  03/29/23 11:30 EDT

## 2023-03-30 ENCOUNTER — DOCUMENTATION (OUTPATIENT)
Dept: NUTRITION | Facility: HOSPITAL | Age: 63
End: 2023-03-30
Payer: COMMERCIAL

## 2023-03-30 ENCOUNTER — HOSPITAL ENCOUNTER (OUTPATIENT)
Dept: RADIATION ONCOLOGY | Facility: HOSPITAL | Age: 63
Discharge: HOME | End: 2023-03-30
Payer: COMMERCIAL

## 2023-03-30 ENCOUNTER — OFFICE VISIT (OUTPATIENT)
Dept: RADIATION ONCOLOGY | Facility: HOSPITAL | Age: 63
End: 2023-03-30
Payer: COMMERCIAL

## 2023-03-30 ENCOUNTER — HOSPITAL ENCOUNTER (OUTPATIENT)
Dept: RADIATION ONCOLOGY | Facility: HOSPITAL | Age: 63
Setting detail: RADIATION/ONCOLOGY SERIES
Discharge: HOME | End: 2023-03-30
Payer: COMMERCIAL

## 2023-03-30 VITALS
HEART RATE: 89 BPM | DIASTOLIC BLOOD PRESSURE: 70 MMHG | SYSTOLIC BLOOD PRESSURE: 155 MMHG | TEMPERATURE: 98.5 F | WEIGHT: 119.71 LBS | OXYGEN SATURATION: 99 % | RESPIRATION RATE: 16 BRPM | BODY MASS INDEX: 19.92 KG/M2

## 2023-03-30 DIAGNOSIS — C01 PRIMARY SQUAMOUS CELL CARCINOMA OF BASE OF TONGUE: Primary | ICD-10-CM

## 2023-03-30 DIAGNOSIS — C01 MALIGNANT NEOPLASM OF BASE OF TONGUE: ICD-10-CM

## 2023-03-30 DIAGNOSIS — R13.12 OROPHARYNGEAL DYSPHAGIA: Primary | ICD-10-CM

## 2023-03-30 PROCEDURE — 77334 RADIATION TREATMENT AID(S): CPT | Performed by: RADIOLOGY

## 2023-03-30 PROCEDURE — 99213 OFFICE O/P EST LOW 20 MIN: CPT | Performed by: RADIOLOGY

## 2023-03-30 PROCEDURE — 92526 ORAL FUNCTION THERAPY: CPT | Performed by: SPEECH-LANGUAGE PATHOLOGIST

## 2023-03-30 PROCEDURE — 92610 EVALUATE SWALLOWING FUNCTION: CPT | Performed by: SPEECH-LANGUAGE PATHOLOGIST

## 2023-03-30 PROCEDURE — 77470 SPECIAL RADIATION TREATMENT: CPT | Performed by: RADIOLOGY

## 2023-03-30 PROCEDURE — G0463 HOSPITAL OUTPT CLINIC VISIT: HCPCS | Performed by: RADIOLOGY

## 2023-03-30 PROCEDURE — 77263 THER RADIOLOGY TX PLNG CPLX: CPT | Performed by: RADIOLOGY

## 2023-03-30 PROCEDURE — 25510000001 IOPAMIDOL PER 1 ML: Performed by: RADIOLOGY

## 2023-03-30 RX ADMIN — IOPAMIDOL 98 ML: 755 INJECTION, SOLUTION INTRAVENOUS at 11:12

## 2023-03-30 NOTE — PROGRESS NOTES
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 Carina Serna is a 63 y.o. year old female who is here today for dysphagia evaluation and to develop a prophylactic HEP to promote a safe swallow throughout concurrent radiation and chemotherapy. Patient is diagnosed with right lateral base of tongue and glossotonsillar sulcus moderate differentiated squamous cell carcinoma.  Patient is to begin treatment with weekly Cisplatin and radiation times 7 weeks.   Patient is positive for significant weight loss.     Patient indicates a PEG tube was placed and available for use as needed and she has recently underwent removal of all teeth.  Ms. Serna indicates she had a cyst removed and  some reconstruction of the jaw using a metal implant on the right lower mandible status post three weeks.   Patient has mild swelling along the surgical incision and will continue to monitor lymphedema during visits. Additionally, patient has mild decreased range of motion in the neck.         Precautions: no  Patient's Goals/Expectations: Evaluate swallow and learn to maintain a safe swallow throughout and after  radiotherapy.    Current Diet Level: mashed soft solids and thin liquids    Comments: Patient lives in [place] [facility] with her , daughter and granddaughter.  She denies use of alcohol and tobacco.      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  no  Caregiver Neglect: no      Pain  Pain Intensity: 5  Pain Location: throat  Pain Scale Used: Numerical  Pain Management Techniques: over-the-counter medication (Ibuprofen)    Orientation    Level of Consciousness: alert and oriented times 3                CLINICAL SWALLOW EVALUATION    Objective    Current Diet Level: soft mashed solids and thin liquids  Oral Motor Exam: Patient is edentulous, , lower left labial droop rated a 3+/5 fair with ability to maintain lip closure with weakness  Soft Palate: WFL  Laryngeal Function and Elevation: WFL  Vocal Quality: WFL  Swallow Reflex: Good initiation of swallow reflex  Positioning: Upright 90 degree hip flexion      P.O. Trials   Patient was clinically assessed for dysphagia with the following consistencies and results:   Patient was assessed using thin liquid of water, nectar thickened apple juice, purée of applesauce, soft solid of banana      Nectar thick liquid by spoon: WFL  Nectar thick by cup: WFL  Thin Liquid by spoon: WFL  Thin Liquid by cup: WFL  Thin liquid by straw: WFL  Puree solid: WFL  Soft Solid: Increased mastication time secondary to being edentulous, laryngeal elevation to palpation noted, good  ability to propel bolus, good initiation of swallows and no complaints of residuals after the swallow, oral cavity clear  Solid: No trials secondary to being edentulous  Additional Trials: N/A  Oral Preparatory Phase: Impaired secondary to being edentulous  Oral Phase: WFL  Pharyngeal Phase: WFL  Laryngeal Elevation/Coordination: WFL    Comments: Patient demonstrated mild dysphagia secondary to being edentulous.  No overt clinical signs and symptoms of aspiration was noted during the swallow.    Dysphagia Criteria    Patient Demonstrates: Risk of aspiration  Swallow Function: Patient demonstrated no overt, clinical signs and symptoms of aspiration.  Patient demonstrates very mild oropharngeal dysphagia secondary to being edentulous.  Patient will benefit from a swallow therapy to to learn an HEP to maintain swallow at highest levels of function and decrease risk of aspiration that may lead to aspiration pneumonia and/or respiratory failure.      Recommendations    Diet:   Moist, soft solids and thin liquids, PEG tube use to support nutrition as needed  Position:  Upright 90 degree hip flexion for all p.o. intake    Environment:  Quiet environment with ample time to feed.    Compensatory Techniques:  Alternate small sips of liquids and small bites of solids, effortful swallows, p.o. intake of all meals recommended with support of PEG tube to maintain nutrition after the meal.    Medical Intervention:  Follow-up evaluation of swallow and 3 to 4 weeks    ST Functional Communication Testing    Patient is rated on the Functional Communication Measures (FCM) for swallow at a level 6/7 with a 1-19% limitation.  With swallow therapy, patient is expected to maintain a Functional Communication Measure level of 6/7 with a 1-19% swallow limitation or highest levels of functioning during and after radiation therapy.        Goals    Problems  1. Swallowing limitation 1 to 19% FCM 6/7  LTG 1 week: 1 session : Patient will complete  exercises to demonstrate understanding of HEP to promote maintaining a swallowing limitation of 1 to 19% or highest levels of functioning and a swallow Functional Communication Measure of 6/7 during radiation treatment.    STATUS: Met   STG 1a: 1 session: Patient will be educated on  proper diet, signs and symptoms of aspiration.    STATUS: Met   STG 1b: 1 session:  Patient will demonstrate understanding of a prophylactic HEP to reduce toxicities of radiation to the swallow and maintain highest levels of functioning with mod assist.    STATUS: Met  Treatment: Swallow therapy to provide a prophylactic home exercise program to maintain a swallow at highest levels of functioning during radiation therapy.       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 Nikolai Weiner    Initial Visit:oropharyngeal dysphagia  SUBJECTIVE  Patient Comments:      Patient had ample time to ask questions.  She indicates and demonstrates understanding.    Patient was educated on plan of care, diet, signs and symptoms of aspiration and HEP  OBJECTIVE    Today's Visit Number Swallow: 2    Dysphagia Therapy    GOALS ACTIVITY PERFORMED TRIALS COMPLETED LEVEL OF CUEING REQUIRED   LTG 1 week: 1 session : Patient will complete exercises to demonstrate understanding of HEP to  promote maintaining a swallowing limitation of 1-19 or highest levels of functioning and a swallow Functional Communication Measure of 6/7 during radiation treatment.    STATUS: Met   * goal met   STG 1a: 1 session: Patient will be educated on  proper diet, signs and symptoms of aspiration.   Education -diet  -HEP  -signs and symptoms of aspiration  -The swallow during and after radiation therapy Patient given a teaching handout on all topics.  Patient had ample time to ask questions.  *Goal met   STG 1b: 1 session:  Patient will demonstrate understanding of a prophylactic HEP to reduce toxicities of radiation to the swallow and maintain highest levels of functioning with mod assist. HEP HEP  -tongue base retraction  -Yawn  -effortful swallow  -Kori  Mod assist to demonstrate with patient given teaching handout of all exercises.    *Goal met            ASSESSMENT/PLAN  Need for skilled care:  Patient requires the skills of a therapist to provide moderate assist to educate patient and teach patient a HEP program to preserve the swallow at highest levels of functioning during and after radiation therapy.     Progress Towards Goals: Goals are met and patient is ready for discharge of this plan of care.      Katlin Prado MS, CCC-SLP    ST SIGNATURE: DESMOND Asher    Electronically signed 3/30/2023    [place] License: - [id number]

## 2023-03-30 NOTE — PROGRESS NOTES
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  She reports feeling well overall and continues to heal after surgery with some right mandibular/facial swelling.    Subjective      Review of Systems: Review of Systems   Constitutional: Positive for fatigue (3/10). Negative for appetite change (decreased).   HENT: Positive for ear pain (right ear), sore throat, tinnitus and trouble swallowing.    Respiratory: Negative for cough and shortness of breath.    Gastrointestinal: Negative for constipation, diarrhea and nausea.   Genitourinary: Negative for difficulty urinating, dysuria, frequency and urgency.   Musculoskeletal: Negative for arthralgias and back pain.   Skin: Negative for rash.   Neurological: Negative for dizziness and headaches.   Psychiatric/Behavioral: Positive for sleep disturbance (difficulty with falling and staying asleep).       The following portions of the patient's history were reviewed and updated as appropriate: allergies, current medications, past family history, past medical history, past social history, past surgical history and problem list.    Medications:     Current Outpatient Medications:   •  ibuprofen (ADVIL,MOTRIN) 600 MG tablet, TAKE 1 TABLET BY MOUTH EVERY 6 HOURS. NOT TO EXCEED 3200 MG DAILY, Disp: , Rfl:   •  chlorhexidine (PERIDEX) 0.12 % solution, RINSE AND GARGLE 15 ML BY MOUTH THREE TIMES DAILY SWISH AND SPIT. DO NOT SWALLOW (Patient not taking: Reported on 3/28/2023), Disp: , Rfl:   •  diphenhydrAMINE 12.5 MG/5ML elixir 20 mL, aluminum-magnesium  hydroxide-simethicone 400-400-40 MG/5ML suspension 20 mL, Lidocaine Viscous HCl 2 % solution 20 mL, Swish and spit 15 mL Every 4 (Four) Hours As Needed for Mucositis. (Patient not taking: Reported on 3/30/2023), Disp: 300 mL, Rfl: 2  •  HYDROcodone-acetaminophen (NORCO) 5-325 MG per tablet, Take 1 tablet by mouth Every 6 (Six) Hours As Needed for Moderate Pain. (Patient not taking: Reported on 3/28/2023), Disp: 30 tablet, Rfl: 0  •  OLANZapine (zyPREXA) 5 MG tablet, Take 1 tablet by mouth Every Night. Take on days 2, 3 and 4 after chemotherapy. (Patient not taking: Reported on 3/30/2023), Disp: 3 tablet, Rfl: 5  •  ondansetron (ZOFRAN) 8 MG tablet, Take 1 tablet by mouth 3 (Three) Times a Day As Needed for Nausea or Vomiting. (Patient not taking: Reported on 3/30/2023), Disp: 30 tablet, Rfl: 5    Allergies:   No Known Allergies    ECOG: (1) Restricted in physically strenuous activity, ambulatory and able to do work of light nature  Quality of Life: 90 - Limited Activity     Objective     Physical Exam:   Vital Signs:   Vitals:    03/30/23 0843   BP: 155/70   Pulse: 89   Resp: 16   Temp: 98.5 °F (36.9 °C)   TempSrc: Temporal   SpO2: 99%   Weight: 54.3 kg (119 lb 11.4 oz)   PainSc:   5   PainLoc: Throat     Body mass index is 19.92 kg/m².     Physical Exam  Constitutional:       General: She is not in acute distress.     Appearance: Normal appearance. She is normal weight.   HENT:      Head: Normocephalic.      Mouth/Throat:      Comments: Right mandibular region edema  Pulmonary:      Effort: Pulmonary effort is normal. No respiratory distress.   Skin:     General: Skin is warm and dry.   Neurological:      General: No focal deficit present.      Mental Status: She is alert and oriented to person, place, and time.      Cranial Nerves: No cranial nerve deficit.   Psychiatric:         Mood and Affect: Mood normal.         Behavior: Behavior normal.         Judgment: Judgment normal.         Radiographs: CT Soft Tissue  Neck With Contrast    Result Date: 1/25/2023    1. 2.7 cm x 2.3 cm abnormal soft tissue density likely representing neoplasm centered in the lateral right oropharynx with extension toward the lingual tonsil 2. Moderately suspicious round right level 2 lymph node measuring 0.7 cm in short axis.  Consider PET-CT to further evaluate. 3. Probable periapical abscesses versus dentigerous cysts involving the right mandibular 1st premolar and 3rd molar teeth.  The 3rd molar is impacted.  The 1st premolar appears carious or chipped.     Gabriel Prather M.D.       Electronically Signed and Approved By: Gabriel Prather M.D. on 1/25/2023 at 15:32             CT Chest With Contrast Diagnostic    Result Date: 1/25/2023    1. Subcarinal lymph node at the upper limit of normal for size.  Consider follow-up chest CT versus PET/CT to further evaluate. 2. Mild emphysematous changes 3. Small hiatal hernia     Gabriel Prather M.D.       Electronically Signed and Approved By: Gabriel Prather M.D. on 1/25/2023 at 16:29             XR Chest 1 View    Result Date: 2/28/2023    Right jugular Port-A-Cath tip is at the cavoatrial junction.  No pneumothorax is seen.       BOBBY OCONNELL MD       Electronically Signed and Approved By: BOBBY OCONNELL MD on 2/28/2023 at 16:24             XR Chest 1 View    Result Date: 2/28/2023   Catheter tip is seen at the cavoatrial junction.       HILDA WILKINS MD       Electronically Signed and Approved By: HILDA WILKINS MD on 2/28/2023 at 16:03             NM PET/CT Skull Base to Mid Thigh    Result Date: 3/7/2023    1. Intense abnormal radiopharmaceutical accumulation corresponding to the soft tissue mass at the right aspect of the oropharynx consistent with malignancy. 2. Mildly increased radiopharmaceutical accumulation corresponding to lymph nodes along the cervical/jugular chain bilaterally indicating developing malignant metastatic lymphadenopathy.  3. No evidence for additional malignancy or more distal  metastatic disease.  4. Mild to moderate changes of emphysema/COPD are noted. 5. Coronary artery calcifications are observed. 6. Areas of ill-defined ground-glass density in the right upper lobe.  These findings may relate to areas of scarring.  Developing infiltrates in the setting of right upper lobe pneumonia could be considered.  Recommend correlation for signs or symptoms of acute infection.  Also recommend follow-up CT of the chest in 6 months to document stability versus resolution.       RADAMES RENE MD       Electronically Signed and Approved By: RADAMES RENE MD on 3/07/2023 at 9:55           I personally reviewed the PET/CT performed on 3/7/2023.  The pertinent findings are as above.    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  She has no clinical or radiographic evidence of distant metastatic disease.  ECOG 1    I discussed the findings of the most recent PET/CT with Ms. Serna.  We once again discussed the acute and chronic toxicities associated with radiotherapy.  She will undergo CT simulation today for treatment planning purposes.      Nikolai Weiner MD  Radiation Oncology  [facility]    This document has been signed by Nikolai Weiner MD on March 30, 2023 09:54 EDT

## 2023-03-30 NOTE — PROGRESS NOTES
Reason: Fax EN Referral    Vendor: [employer]    Faxed signed EN order/referral to [employer]. Fax confirmation received 3/30/23 at 15:19. Will continue to f/u with pt per protocol / via consult.

## 2023-03-31 ENCOUNTER — DOCUMENTATION (OUTPATIENT)
Dept: NUTRITION | Facility: HOSPITAL | Age: 63
End: 2023-03-31
Payer: COMMERCIAL

## 2023-03-31 NOTE — PROGRESS NOTES
Reason: EN Referral    Vendor: [employer] [facility]    Note:   Spoke with Edilma from [employer] regarding EN referral sent 3/30/23. Edilma reports Isosource 1.5 not available and unable to provide alternative EN formula of AMVONET Standard 1.4. Only other alternative formula is Jevity 1.5, which is higher fiber content and less likely to be tolerated.   Discussed utilizing Osmolite 1.5 in place of Isosource 1.5. Edilma reports Osmolite 1.5 is available and in stock. Edilma to resend / fax Dr. Weiner new EN order for Osmolite 1.5 to be signed. Edilma also reports she will contact radiation oncology to confirm receipt of new EN fax with instructions on faxing back.    Will continue to f/u per protocol.     Electronically signed by:  Dennise Stnoe RD  03/31/23 14:34 EDT

## 2023-04-03 ENCOUNTER — HOSPITAL ENCOUNTER (OUTPATIENT)
Dept: RADIATION ONCOLOGY | Facility: HOSPITAL | Age: 63
Setting detail: RADIATION/ONCOLOGY SERIES
End: 2023-04-03
Payer: COMMERCIAL

## 2023-04-04 ENCOUNTER — TELEPHONE (OUTPATIENT)
Dept: NUTRITION | Facility: HOSPITAL | Age: 63
End: 2023-04-04
Payer: COMMERCIAL

## 2023-04-04 NOTE — PROGRESS NOTES
Outpatient Nutrition Oncology Follow Up    Patient Name: Carina Serna  YOB: 1960  MRN: [id number]    Recommendation(s):    (Bolus feeds)  If wt stable at 54 kg, suggest to continue 1 can Osmolite 1.5 BID + 120 mL h20 flush BID w/ each feeding.  If wt declines prior to starting tx, suggest 1 can Osmolite 1.5 TID + 120 mL h20 flush BID w/ each feeding.  To meet 100% of pt's nutrition / hydration needs, suggest 5 total cans Osmolite 1.5 daily + 150 mL h20 flush 5 X day.    Reason for Consult:  EN f/u       Last Weight Obtained:  54.3 kg (3/30/23)    Weight Change:  No wt obtained since 3/30/23 at office or home- pt unsure of CBW    Nutrition-related concerns: Dysphagia/odynophagia (ongoing)    Current PO intake:  Very soft or mashed foods    Current EN RX:  1 can Osmolite 1.5 BID via syringe + 120 mL h20 flush BID with each feeding    Consult or Intervention:  F/U phone call to see how pt was tolerating the substitution formula, Osmolite 1.5.  As noted previously, vendor was unable to send EN formula from original order:  Isosource 1.5 (due to nationwide shortages).  Pt received her delivery of supplies & formula yesterday (vendor [employer]).  She reports good tolerance to the Osmolite 1.5 so far.  Previously relying on samples of Isosource 1.5.  Pt denies nutrition-related concerns or intolerance to EN feeds.  She has been giving 2/day on average.  Encouraged daily wt's in order to assess if she needs to advance to 1 can TID until pt can be monitored regularly in radiation oncology.  Encouraged pt to reach out to Oncology RD's prior to tx as needed, for questions or concerns.    Nutrition Diagnosis: Severe malnutrition related to decreased ability to consume sufficient energy as evidenced by physiological causes increasing nutrient needs. and hypermetabolic state.    Plan: Will follow up per oncology nutrition protocol

## 2023-04-06 ENCOUNTER — HOSPITAL ENCOUNTER (OUTPATIENT)
Dept: RADIATION ONCOLOGY | Facility: HOSPITAL | Age: 63
Discharge: HOME | End: 2023-04-06

## 2023-04-07 PROBLEM — Z45.2 ENCOUNTER FOR ADJUSTMENT OR MANAGEMENT OF VASCULAR ACCESS DEVICE: Status: ACTIVE | Noted: 2023-04-07

## 2023-04-07 RX ORDER — HEPARIN SODIUM (PORCINE) LOCK FLUSH IV SOLN 100 UNIT/ML 100 UNIT/ML
500 SOLUTION INTRAVENOUS AS NEEDED
Status: CANCELLED | OUTPATIENT
Start: 2023-04-10

## 2023-04-07 RX ORDER — SODIUM CHLORIDE 9 MG/ML
250 INJECTION, SOLUTION INTRAVENOUS ONCE
Status: CANCELLED | OUTPATIENT
Start: 2023-04-10

## 2023-04-07 RX ORDER — OLANZAPINE 5 MG/1
5 TABLET ORAL ONCE
Status: CANCELLED | OUTPATIENT
Start: 2023-04-10 | End: 2023-04-10

## 2023-04-07 RX ORDER — PALONOSETRON 0.05 MG/ML
0.25 INJECTION, SOLUTION INTRAVENOUS ONCE
Status: CANCELLED | OUTPATIENT
Start: 2023-04-10

## 2023-04-07 RX ORDER — SODIUM CHLORIDE 0.9 % (FLUSH) 0.9 %
20 SYRINGE (ML) INJECTION AS NEEDED
Status: CANCELLED | OUTPATIENT
Start: 2023-04-10

## 2023-04-10 ENCOUNTER — HOSPITAL ENCOUNTER (OUTPATIENT)
Dept: RADIATION ONCOLOGY | Facility: HOSPITAL | Age: 63
Discharge: HOME | End: 2023-04-10

## 2023-04-10 ENCOUNTER — HOSPITAL ENCOUNTER (OUTPATIENT)
Dept: ONCOLOGY | Facility: HOSPITAL | Age: 63
Discharge: HOME | End: 2023-04-10
Admitting: INTERNAL MEDICINE
Payer: COMMERCIAL

## 2023-04-10 ENCOUNTER — DOCUMENTATION (OUTPATIENT)
Dept: RADIATION ONCOLOGY | Facility: HOSPITAL | Age: 63
End: 2023-04-10
Payer: COMMERCIAL

## 2023-04-10 VITALS
TEMPERATURE: 99.1 F | HEART RATE: 86 BPM | RESPIRATION RATE: 16 BRPM | OXYGEN SATURATION: 97 % | HEIGHT: 63 IN | SYSTOLIC BLOOD PRESSURE: 135 MMHG | DIASTOLIC BLOOD PRESSURE: 70 MMHG | BODY MASS INDEX: 21.37 KG/M2

## 2023-04-10 DIAGNOSIS — Z45.2 ENCOUNTER FOR ADJUSTMENT OR MANAGEMENT OF VASCULAR ACCESS DEVICE: ICD-10-CM

## 2023-04-10 DIAGNOSIS — C02.9 PRIMARY TONGUE SQUAMOUS CELL CARCINOMA: Primary | ICD-10-CM

## 2023-04-10 LAB
ALBUMIN SERPL-MCNC: 4.1 G/DL (ref 3.5–5.2)
ALBUMIN/GLOB SERPL: 1.2 G/DL
ALP SERPL-CCNC: 107 U/L (ref 39–117)
ALT SERPL W P-5'-P-CCNC: 29 U/L (ref 1–33)
ANION GAP SERPL CALCULATED.3IONS-SCNC: 12.5 MMOL/L (ref 5–15)
AST SERPL-CCNC: 22 U/L (ref 1–32)
BASOPHILS # BLD AUTO: 0.05 10*3/MM3 (ref 0–0.2)
BASOPHILS NFR BLD AUTO: 0.3 % (ref 0–1.5)
BILIRUB SERPL-MCNC: 0.2 MG/DL (ref 0–1.2)
BUN SERPL-MCNC: 16 MG/DL (ref 8–23)
BUN/CREAT SERPL: 26.2 (ref 7–25)
CALCIUM SPEC-SCNC: 9.6 MG/DL (ref 8.6–10.5)
CHLORIDE SERPL-SCNC: 99 MMOL/L (ref 98–107)
CO2 SERPL-SCNC: 22.5 MMOL/L (ref 22–29)
CREAT SERPL-MCNC: 0.61 MG/DL (ref 0.57–1)
DEPRECATED RDW RBC AUTO: 42.6 FL (ref 37–54)
EGFRCR SERPLBLD CKD-EPI 2021: 100.6 ML/MIN/1.73
EOSINOPHIL # BLD AUTO: 0.06 10*3/MM3 (ref 0–0.4)
EOSINOPHIL NFR BLD AUTO: 0.4 % (ref 0.3–6.2)
ERYTHROCYTE [DISTWIDTH] IN BLOOD BY AUTOMATED COUNT: 13 % (ref 12.3–15.4)
GLOBULIN UR ELPH-MCNC: 3.3 GM/DL
GLUCOSE SERPL-MCNC: 101 MG/DL (ref 65–99)
HCT VFR BLD AUTO: 31.8 % (ref 34–46.6)
HGB BLD-MCNC: 10.4 G/DL (ref 12–15.9)
IMM GRANULOCYTES # BLD AUTO: 0.05 10*3/MM3 (ref 0–0.05)
IMM GRANULOCYTES NFR BLD AUTO: 0.3 % (ref 0–0.5)
LYMPHOCYTES # BLD AUTO: 1.44 10*3/MM3 (ref 0.7–3.1)
LYMPHOCYTES NFR BLD AUTO: 9.5 % (ref 19.6–45.3)
MAGNESIUM SERPL-MCNC: 2 MG/DL (ref 1.6–2.4)
MCH RBC QN AUTO: 29.1 PG (ref 26.6–33)
MCHC RBC AUTO-ENTMCNC: 32.7 G/DL (ref 31.5–35.7)
MCV RBC AUTO: 89.1 FL (ref 79–97)
MONOCYTES # BLD AUTO: 0.85 10*3/MM3 (ref 0.1–0.9)
MONOCYTES NFR BLD AUTO: 5.6 % (ref 5–12)
NEUTROPHILS NFR BLD AUTO: 12.68 10*3/MM3 (ref 1.7–7)
NEUTROPHILS NFR BLD AUTO: 83.9 % (ref 42.7–76)
NRBC BLD AUTO-RTO: 0 /100 WBC (ref 0–0.2)
PLATELET # BLD AUTO: 422 10*3/MM3 (ref 140–450)
PMV BLD AUTO: 9.9 FL (ref 6–12)
POTASSIUM SERPL-SCNC: 4 MMOL/L (ref 3.5–5.2)
PROT SERPL-MCNC: 7.4 G/DL (ref 6–8.5)
RAD ONC ARIA COURSE ID: NORMAL
RAD ONC ARIA COURSE INTENT: NORMAL
RAD ONC ARIA COURSE LAST TREATMENT DATE: NORMAL
RAD ONC ARIA COURSE START DATE: NORMAL
RAD ONC ARIA COURSE TREATMENT ELAPSED DAYS: 0
RAD ONC ARIA FIRST TREATMENT DATE: NORMAL
RAD ONC ARIA PLAN FRACTIONS TREATED TO DATE: 1
RAD ONC ARIA PLAN ID: NORMAL
RAD ONC ARIA PLAN PRESCRIBED DOSE PER FRACTION: 2 GY
RAD ONC ARIA PLAN PRIMARY REFERENCE POINT: NORMAL
RAD ONC ARIA PLAN TOTAL FRACTIONS PRESCRIBED: 35
RAD ONC ARIA PLAN TOTAL PRESCRIBED DOSE: 7000 CGY
RAD ONC ARIA REFERENCE POINT DOSAGE GIVEN TO DATE: 2 GY
RAD ONC ARIA REFERENCE POINT ID: NORMAL
RAD ONC ARIA REFERENCE POINT SESSION DOSAGE GIVEN: 2 GY
RBC # BLD AUTO: 3.57 10*6/MM3 (ref 3.77–5.28)
SODIUM SERPL-SCNC: 134 MMOL/L (ref 136–145)
WBC NRBC COR # BLD: 15.13 10*3/MM3 (ref 3.4–10.8)

## 2023-04-10 PROCEDURE — 96375 TX/PRO/DX INJ NEW DRUG ADDON: CPT

## 2023-04-10 PROCEDURE — 77301 RADIOTHERAPY DOSE PLAN IMRT: CPT | Performed by: RADIOLOGY

## 2023-04-10 PROCEDURE — 77338 DESIGN MLC DEVICE FOR IMRT: CPT | Performed by: RADIOLOGY

## 2023-04-10 PROCEDURE — 25010000002 CISPLATIN PER 50 MG: Performed by: INTERNAL MEDICINE

## 2023-04-10 PROCEDURE — 77386: CPT | Performed by: RADIOLOGY

## 2023-04-10 PROCEDURE — 77427 RADIATION TX MANAGEMENT X5: CPT | Performed by: RADIOLOGY

## 2023-04-10 PROCEDURE — 77300 RADIATION THERAPY DOSE PLAN: CPT | Performed by: RADIOLOGY

## 2023-04-10 PROCEDURE — 25010000002 POTASSIUM CHLORIDE PER 2 MEQ OF POTASSIUM: Performed by: INTERNAL MEDICINE

## 2023-04-10 PROCEDURE — 25010000002 DEXAMETHASONE SODIUM PHOSPHATE 120 MG/30ML SOLUTION: Performed by: INTERNAL MEDICINE

## 2023-04-10 PROCEDURE — 25010000002 MAGNESIUM SULFATE PER 500 MG OF MAGNESIUM: Performed by: INTERNAL MEDICINE

## 2023-04-10 PROCEDURE — 96413 CHEMO IV INFUSION 1 HR: CPT

## 2023-04-10 PROCEDURE — 25010000002 PALONOSETRON PER 25 MCG: Performed by: INTERNAL MEDICINE

## 2023-04-10 PROCEDURE — 85025 COMPLETE CBC W/AUTO DIFF WBC: CPT | Performed by: INTERNAL MEDICINE

## 2023-04-10 PROCEDURE — 25010000002 HEPARIN LOCK FLUSH PER 10 UNITS: Performed by: INTERNAL MEDICINE

## 2023-04-10 PROCEDURE — 25010000002 FOSAPREPITANT PER 1 MG: Performed by: INTERNAL MEDICINE

## 2023-04-10 PROCEDURE — 96366 THER/PROPH/DIAG IV INF ADDON: CPT

## 2023-04-10 PROCEDURE — 96367 TX/PROPH/DG ADDL SEQ IV INF: CPT

## 2023-04-10 PROCEDURE — 83735 ASSAY OF MAGNESIUM: CPT | Performed by: INTERNAL MEDICINE

## 2023-04-10 PROCEDURE — 80053 COMPREHEN METABOLIC PANEL: CPT | Performed by: INTERNAL MEDICINE

## 2023-04-10 RX ORDER — OLANZAPINE 2.5 MG/1
5 TABLET ORAL ONCE
Status: COMPLETED | OUTPATIENT
Start: 2023-04-10 | End: 2023-04-10

## 2023-04-10 RX ORDER — SODIUM CHLORIDE 0.9 % (FLUSH) 0.9 %
20 SYRINGE (ML) INJECTION AS NEEDED
Status: DISCONTINUED | OUTPATIENT
Start: 2023-04-10 | End: 2023-04-11 | Stop reason: HOSPADM

## 2023-04-10 RX ORDER — SODIUM CHLORIDE 9 MG/ML
250 INJECTION, SOLUTION INTRAVENOUS ONCE
Status: COMPLETED | OUTPATIENT
Start: 2023-04-10 | End: 2023-04-10

## 2023-04-10 RX ORDER — SODIUM CHLORIDE 0.9 % (FLUSH) 0.9 %
20 SYRINGE (ML) INJECTION AS NEEDED
Status: CANCELLED | OUTPATIENT
Start: 2023-04-10

## 2023-04-10 RX ORDER — HEPARIN SODIUM (PORCINE) LOCK FLUSH IV SOLN 100 UNIT/ML 100 UNIT/ML
500 SOLUTION INTRAVENOUS AS NEEDED
Status: DISCONTINUED | OUTPATIENT
Start: 2023-04-10 | End: 2023-04-11 | Stop reason: HOSPADM

## 2023-04-10 RX ORDER — HEPARIN SODIUM (PORCINE) LOCK FLUSH IV SOLN 100 UNIT/ML 100 UNIT/ML
500 SOLUTION INTRAVENOUS AS NEEDED
Status: CANCELLED | OUTPATIENT
Start: 2023-04-10

## 2023-04-10 RX ORDER — PALONOSETRON 0.05 MG/ML
0.25 INJECTION, SOLUTION INTRAVENOUS ONCE
Status: COMPLETED | OUTPATIENT
Start: 2023-04-10 | End: 2023-04-10

## 2023-04-10 RX ADMIN — DEXAMETHASONE SODIUM PHOSPHATE 12 MG: 4 INJECTION, SOLUTION INTRA-ARTICULAR; INTRALESIONAL; INTRAMUSCULAR; INTRAVENOUS; SOFT TISSUE at 11:31

## 2023-04-10 RX ADMIN — FOSAPREPITANT 100 ML: 150 INJECTION, POWDER, LYOPHILIZED, FOR SOLUTION INTRAVENOUS at 11:47

## 2023-04-10 RX ADMIN — Medication 20 ML: at 14:25

## 2023-04-10 RX ADMIN — POTASSIUM CHLORIDE 500 ML: 2 INJECTION, SOLUTION, CONCENTRATE INTRAVENOUS at 10:21

## 2023-04-10 RX ADMIN — SODIUM CHLORIDE 250 ML: 9 INJECTION, SOLUTION INTRAVENOUS at 10:21

## 2023-04-10 RX ADMIN — OLANZAPINE 5 MG: 2.5 TABLET, FILM COATED ORAL at 11:25

## 2023-04-10 RX ADMIN — POTASSIUM CHLORIDE 500 ML: 2 INJECTION, SOLUTION, CONCENTRATE INTRAVENOUS at 13:20

## 2023-04-10 RX ADMIN — HEPARIN SODIUM (PORCINE) LOCK FLUSH IV SOLN 100 UNIT/ML 500 UNITS: 100 SOLUTION at 14:25

## 2023-04-10 RX ADMIN — PALONOSETRON 0.25 MG: 0.05 INJECTION, SOLUTION INTRAVENOUS at 11:29

## 2023-04-10 RX ADMIN — CISPLATIN 66 MG: 1 INJECTION, SOLUTION INTRAVENOUS at 12:18

## 2023-04-10 NOTE — PROGRESS NOTES
Diagnosis: Squamous cell carcinoma of the base of tongue     Reason for Referral: New treatment start    Content of Visit: OSW met with Mrs. Serna in radiation oncology this morning prior to her first treatment. She is also initiating chemotherapy today as well. OSW previously spoke with Mrs. Serna via telephone on 2/27/23. Mrs. Serna was very pleasant this morning, alert and oriented x 3 and able to effectively communicate her thoughts and feelings. She discloses frequently experiencing tearfulness throughout the day. OSW previously referred her to [facility] for medication management, however, she had missed her appointment due to misunderstanding with various medical appointments. She is interested in getting rescheduled. OSW assisted her in contacting the Behavioral Health department this morning and this has been rescheduled for 5/4/23 at 0800 EST. She confirms this time is feasible and will then go to radiation therapy afterwards. Mrs. Serna reports her spouse is assisting with her transportation to treatments and has been attending all of her medical appointments to be supportive throughout her care. Spouse is employed, but is able to be involved with her care due to working night shift. Mrs. Serna's daughter assists with her care while spouse is at work. Mrs. Serna's grandson that's living with them is now 8 weeks old. Inquired if Mrs. Serna received the [facility] financial assistance application OSW previously mailed to her. Mrs. Serna reports she's decided to apply for Medicaid benefits to see if she's eligible. She is hoping to qualify for a caregiver stipend for her daughter to receive. Mrs. Serna reports she is aware on how to apply for Medicaid benefits. No additional needs identified at this time. Provided her with my business card today, encouraging OSW support remains available. She expressed gratitude. No SI/HI reported or indicated today.    Resources/Referrals Provided: Emotional  support, [facility]

## 2023-04-10 NOTE — PROGRESS NOTES
"Outpatient Nutrition Oncology Follow Up    Patient Name: Carina Serna  YOB: 1960  MRN: [id number]    Recommendation(s):     (Bolus feeds)  If wt stable at 54 kg, suggest to continue 1 can Osmolite 1.5 BID + 120 mL h20 flush BID w/ each feeding.    If wt declines during tx, suggest 1 can Osmolite 1.5 TID + 120 mL h20 flush BID w/ each feeding.    To meet 100% of pt's nutrition / hydration needs, suggest 5 total cans Osmolite 1.5 daily + 150 mL h20 flush 5 X day.      Reason for Consult:  1st week of tx (XRT to head/neck region + Cisplatin)  Please see prior Oncology RD documentation       Today's Weight:  54.3 kg    Weight Change:  1# gain in the past couple of days  (4.8% loss in 1 month; total 13.8% loss X 3 months)    Nutrition-related concerns: Dysphagia/odynophagia and Other: indigestion / belching, Altered taste (everything \"tastes bad\")    Current PO intake:  Very soft or mashed foods    Current EN RX:  1 can Osmolite 1.5  2-3 X via syringe + 120 mL H20 flush BID with each feeding  (Set up with vendor [employer] for EN formula & supplies)    Consult or Intervention:   Pt with slight wt gain over the past week, however overall she has lost significant wt in 3 months.  She reports to be doing fair with EN feeds, however experiencing new issue of belching & indigestion now.  Pt asked if she could take Tums for this- confirmed with radiation oncology RN that she can & communicated this to pt.  She hasn't been taking anti-emetics yet, therefore encouraged her to ask staff for instructions, as this may help with indigestion as well.  Requested radiation oncology RN's provide pt with gravity bags as a trial to see if tolerates better than bolus / syringe feeds- staff will provide gravity bags to her tomorrow.  Briefly provided verbal explanation / description of gravity feedings.  She has been using homemade mouth rinses to help with altered taste, but not helping.  She occasionally uses Peridex " (prescribed after dental surgery), but also confirmed with radiation oncology RN's that pt should stop using (may aggravate dryness & altered taste).  Reviewed MNT for potential side effect of n/v.  Reviewed MNT for altered taste.       Handouts provided:  Nausea & Vomiting, Taste & Smell Changes, How to Administer Gravity Feedings    Nutrition Diagnosis: Moderate malnutrition related to decreased ability to consume sufficient energy as evidenced by physiological causes increasing nutrient needs., hypermetabolic state., muscle wasting., fat loss., patient report. and ongoing odynophagia, new report of indigestion / belching, significant wt decline..    Plan: Will follow up per oncology nutrition protocol

## 2023-04-11 ENCOUNTER — HOSPITAL ENCOUNTER (OUTPATIENT)
Dept: RADIATION ONCOLOGY | Facility: HOSPITAL | Age: 63
Discharge: HOME | End: 2023-04-11

## 2023-04-11 ENCOUNTER — TELEPHONE (OUTPATIENT)
Dept: ONCOLOGY | Facility: HOSPITAL | Age: 63
End: 2023-04-11
Payer: COMMERCIAL

## 2023-04-11 VITALS
BODY MASS INDEX: 21.61 KG/M2 | OXYGEN SATURATION: 98 % | HEART RATE: 97 BPM | RESPIRATION RATE: 16 BRPM | DIASTOLIC BLOOD PRESSURE: 85 MMHG | SYSTOLIC BLOOD PRESSURE: 152 MMHG | WEIGHT: 121.03 LBS | TEMPERATURE: 97.8 F

## 2023-04-11 DIAGNOSIS — C01 MALIGNANT NEOPLASM OF BASE OF TONGUE: Primary | ICD-10-CM

## 2023-04-11 DIAGNOSIS — R13.12 OROPHARYNGEAL DYSPHAGIA: Primary | ICD-10-CM

## 2023-04-11 DIAGNOSIS — R13.12 OROPHARYNGEAL DYSPHAGIA: ICD-10-CM

## 2023-04-11 LAB
RAD ONC ARIA COURSE ID: NORMAL
RAD ONC ARIA COURSE INTENT: NORMAL
RAD ONC ARIA COURSE LAST TREATMENT DATE: NORMAL
RAD ONC ARIA COURSE START DATE: NORMAL
RAD ONC ARIA COURSE TREATMENT ELAPSED DAYS: 1
RAD ONC ARIA FIRST TREATMENT DATE: NORMAL
RAD ONC ARIA PLAN FRACTIONS TREATED TO DATE: 2
RAD ONC ARIA PLAN ID: NORMAL
RAD ONC ARIA PLAN PRESCRIBED DOSE PER FRACTION: 2 GY
RAD ONC ARIA PLAN PRIMARY REFERENCE POINT: NORMAL
RAD ONC ARIA PLAN TOTAL FRACTIONS PRESCRIBED: 35
RAD ONC ARIA PLAN TOTAL PRESCRIBED DOSE: 7000 CGY
RAD ONC ARIA REFERENCE POINT DOSAGE GIVEN TO DATE: 4 GY
RAD ONC ARIA REFERENCE POINT ID: NORMAL
RAD ONC ARIA REFERENCE POINT SESSION DOSAGE GIVEN: 2 GY

## 2023-04-11 PROCEDURE — 77014 CHG CT GUIDANCE RADIATION THERAPY FLDS PLACEMENT: CPT | Performed by: RADIOLOGY

## 2023-04-11 PROCEDURE — 77386: CPT | Performed by: RADIOLOGY

## 2023-04-11 RX ORDER — HYDROCODONE BITARTRATE AND ACETAMINOPHEN 7.5; 325 MG/1; MG/1
1 TABLET ORAL EVERY 6 HOURS PRN
Qty: 60 TABLET | Refills: 0 | Status: SHIPPED | OUTPATIENT
Start: 2023-04-11

## 2023-04-11 NOTE — PROGRESS NOTES
On Treatment Visit       Patient: Carina Serna   YOB: 1960   Medical Record Number: [id number]     Date of Visit  April 11, 2023   Primary Diagnosis:Malignant neoplasm of base of tongue [C01]  Cancer Staging: Cancer Staging   No matching staging information was found for the patient.       was seen today for an on treatment visit.  She is receiving radiation therapy to the head and neck. She  has received 400 cGy in 2 fractions out of a planned dose of 7000 cGy in 35 fractions. She is currently receiving concurrent cisplatin chemotherapy per Dr. Cohen.     Persistent uncontrolled pain; currently using Tylenol and ibuprofen.                                       Review of Systems:   Review of Systems   Constitutional: Positive for fatigue (8/10). Negative for appetite change and unexpected weight change.   HENT: Positive for ear pain (right ear hurts throughout the day comes and goes ), sore throat (often) and trouble swallowing (often). Negative for tinnitus.    Eyes: Negative for visual disturbance.   Respiratory: Negative for cough and shortness of breath.    Gastrointestinal: Negative for constipation, diarrhea, nausea and vomiting.   Genitourinary: Negative for difficulty urinating, dysuria, frequency and urgency.   Musculoskeletal: Negative for back pain and joint swelling.   Skin: Negative for color change and rash.   Neurological: Positive for headaches (had a light headache today ). Negative for dizziness and light-headedness.   Psychiatric/Behavioral: Positive for sleep disturbance (toss and turns ). Negative for agitation.       Vitals:     Vitals:    04/11/23 1015   BP: 152/85   Pulse: 97   Resp: 16   Temp: 97.8 °F (36.6 °C)   SpO2: 98%       Weight:   Wt Readings from Last 3 Encounters:   04/11/23 54.9 kg (121 lb 0.5 oz)   03/30/23 54.3 kg (119 lb 11.4 oz)   03/28/23 53.7 kg (118 lb 6.2 oz)      Pain:    Pain Score    04/11/23 1015   PainSc:   9   PainLoc: Throat          Physical Exam:  Gen: WD/WN; NAD  HEENT: MMM  Trachea: midline  Chest: symmetric  Resp: normal respiratory effort  Extr: warm, well-perfused  Neuro: awake and alert; no aphasia or neglect    Plan: I have reviewed treatment setup notes, checked and approved the daily guidance images.  I reviewed dose delivery, treatment parameters and deemed them appropriate. We plan to continue radiation therapy as prescribed.      Will prescribe hydrocodone-acetaminophen 7.5/325    Radiation Oncology    Electronically signed by Nikolai Weiner MD 4/11/2023  11:21 EDT

## 2023-04-11 NOTE — TELEPHONE ENCOUNTER
Carina Quan Daphne 1960       Symptoms    • Does patient have nausea and vomiting? Y    • Does patient have medications prescribed for N/V? Y    • Does patient have diarrhea? N    • Does the patient have diarrhea medications? N    • Does the patient have pain? N    • Is pain medications effective?    Discharge  • Do you have any questions about your discharge instructions? N    [facility] with [facility]    • Where you satisfied with the care you received? Y    Pt suggestions for the [facility]    • Do you have any suggestions to improve your care? N    Comments    • Follow up phone call comments

## 2023-04-12 ENCOUNTER — HOSPITAL ENCOUNTER (OUTPATIENT)
Dept: RADIATION ONCOLOGY | Facility: HOSPITAL | Age: 63
Discharge: HOME | End: 2023-04-12

## 2023-04-12 LAB
RAD ONC ARIA COURSE ID: NORMAL
RAD ONC ARIA COURSE INTENT: NORMAL
RAD ONC ARIA COURSE LAST TREATMENT DATE: NORMAL
RAD ONC ARIA COURSE START DATE: NORMAL
RAD ONC ARIA COURSE TREATMENT ELAPSED DAYS: 2
RAD ONC ARIA FIRST TREATMENT DATE: NORMAL
RAD ONC ARIA PLAN FRACTIONS TREATED TO DATE: 3
RAD ONC ARIA PLAN ID: NORMAL
RAD ONC ARIA PLAN PRESCRIBED DOSE PER FRACTION: 2 GY
RAD ONC ARIA PLAN PRIMARY REFERENCE POINT: NORMAL
RAD ONC ARIA PLAN TOTAL FRACTIONS PRESCRIBED: 35
RAD ONC ARIA PLAN TOTAL PRESCRIBED DOSE: 7000 CGY
RAD ONC ARIA REFERENCE POINT DOSAGE GIVEN TO DATE: 6 GY
RAD ONC ARIA REFERENCE POINT ID: NORMAL
RAD ONC ARIA REFERENCE POINT SESSION DOSAGE GIVEN: 2 GY

## 2023-04-12 PROCEDURE — 77386: CPT | Performed by: RADIOLOGY

## 2023-04-12 PROCEDURE — 77014 CHG CT GUIDANCE RADIATION THERAPY FLDS PLACEMENT: CPT | Performed by: RADIOLOGY

## 2023-04-13 ENCOUNTER — HOSPITAL ENCOUNTER (OUTPATIENT)
Dept: RADIATION ONCOLOGY | Facility: HOSPITAL | Age: 63
Discharge: HOME | End: 2023-04-13

## 2023-04-13 LAB
RAD ONC ARIA COURSE ID: NORMAL
RAD ONC ARIA COURSE INTENT: NORMAL
RAD ONC ARIA COURSE LAST TREATMENT DATE: NORMAL
RAD ONC ARIA COURSE START DATE: NORMAL
RAD ONC ARIA COURSE TREATMENT ELAPSED DAYS: 3
RAD ONC ARIA FIRST TREATMENT DATE: NORMAL
RAD ONC ARIA PLAN FRACTIONS TREATED TO DATE: 4
RAD ONC ARIA PLAN ID: NORMAL
RAD ONC ARIA PLAN PRESCRIBED DOSE PER FRACTION: 2 GY
RAD ONC ARIA PLAN PRIMARY REFERENCE POINT: NORMAL
RAD ONC ARIA PLAN TOTAL FRACTIONS PRESCRIBED: 35
RAD ONC ARIA PLAN TOTAL PRESCRIBED DOSE: 7000 CGY
RAD ONC ARIA REFERENCE POINT DOSAGE GIVEN TO DATE: 8 GY
RAD ONC ARIA REFERENCE POINT ID: NORMAL
RAD ONC ARIA REFERENCE POINT SESSION DOSAGE GIVEN: 2 GY

## 2023-04-13 PROCEDURE — 77386: CPT | Performed by: RADIOLOGY

## 2023-04-13 PROCEDURE — 77014 CHG CT GUIDANCE RADIATION THERAPY FLDS PLACEMENT: CPT | Performed by: RADIOLOGY

## 2023-04-14 ENCOUNTER — DOCUMENTATION (OUTPATIENT)
Dept: NUTRITION | Facility: HOSPITAL | Age: 63
End: 2023-04-14
Payer: COMMERCIAL

## 2023-04-14 ENCOUNTER — HOSPITAL ENCOUNTER (OUTPATIENT)
Dept: RADIATION ONCOLOGY | Facility: HOSPITAL | Age: 63
Discharge: HOME | End: 2023-04-14

## 2023-04-14 VITALS — WEIGHT: 116.4 LBS | BODY MASS INDEX: 20.78 KG/M2

## 2023-04-14 DIAGNOSIS — C01 PRIMARY SQUAMOUS CELL CARCINOMA OF BASE OF TONGUE: Primary | ICD-10-CM

## 2023-04-14 LAB
RAD ONC ARIA COURSE ID: NORMAL
RAD ONC ARIA COURSE INTENT: NORMAL
RAD ONC ARIA COURSE LAST TREATMENT DATE: NORMAL
RAD ONC ARIA COURSE START DATE: NORMAL
RAD ONC ARIA COURSE TREATMENT ELAPSED DAYS: 4
RAD ONC ARIA FIRST TREATMENT DATE: NORMAL
RAD ONC ARIA PLAN FRACTIONS TREATED TO DATE: 5
RAD ONC ARIA PLAN ID: NORMAL
RAD ONC ARIA PLAN PRESCRIBED DOSE PER FRACTION: 2 GY
RAD ONC ARIA PLAN PRIMARY REFERENCE POINT: NORMAL
RAD ONC ARIA PLAN TOTAL FRACTIONS PRESCRIBED: 35
RAD ONC ARIA PLAN TOTAL PRESCRIBED DOSE: 7000 CGY
RAD ONC ARIA REFERENCE POINT DOSAGE GIVEN TO DATE: 10 GY
RAD ONC ARIA REFERENCE POINT ID: NORMAL
RAD ONC ARIA REFERENCE POINT SESSION DOSAGE GIVEN: 2 GY

## 2023-04-14 PROCEDURE — 77386: CPT | Performed by: RADIOLOGY

## 2023-04-14 PROCEDURE — 77014 CHG CT GUIDANCE RADIATION THERAPY FLDS PLACEMENT: CPT | Performed by: RADIOLOGY

## 2023-04-14 PROCEDURE — 77336 RADIATION PHYSICS CONSULT: CPT | Performed by: RADIOLOGY

## 2023-04-14 RX ORDER — SODIUM CHLORIDE 9 MG/ML
250 INJECTION, SOLUTION INTRAVENOUS ONCE
Status: CANCELLED | OUTPATIENT
Start: 2023-04-17

## 2023-04-14 RX ORDER — PALONOSETRON 0.05 MG/ML
0.25 INJECTION, SOLUTION INTRAVENOUS ONCE
Status: CANCELLED | OUTPATIENT
Start: 2023-04-17

## 2023-04-14 RX ORDER — DIPHENHYDRAMINE HYDROCHLORIDE 50 MG/ML
50 INJECTION INTRAMUSCULAR; INTRAVENOUS AS NEEDED
Status: CANCELLED | OUTPATIENT
Start: 2023-04-17

## 2023-04-14 RX ORDER — FAMOTIDINE 10 MG/ML
20 INJECTION, SOLUTION INTRAVENOUS AS NEEDED
Status: CANCELLED | OUTPATIENT
Start: 2023-04-17

## 2023-04-14 RX ORDER — FAMOTIDINE 10 MG/ML
20 INJECTION, SOLUTION INTRAVENOUS ONCE
Status: CANCELLED | OUTPATIENT
Start: 2023-04-17

## 2023-04-14 NOTE — PROGRESS NOTES
"Outpatient Nutrition Oncology Follow Up    Patient Name: Carina Serna  YOB: 1960  MRN: [id number]    Recommendation(s):     Increase EN of Osmolite 1.5 to bolus 1 can TID with 120ml h20 flush with each feed  Consider utilizing gravity method to aid GI fxn    May consider schedule for anti-emetic if medically appropriate    To meet 100% of pt's nutrition / hydration needs, suggest 5 total cans Osmolite 1.5 daily + 150 mL h20 flush 5 X day.    Reason for Consult: Radiation F/U  Dx: SCC of hypopharynx  Cancer Tx: Radiation to head and neck; Cisplatin     Today's Weight: 52.8 kg  Weight Change: 2.8% weight loss since last RD assessment (4/10/23); overall weight loss of 16.6% X 3 months ( both significant)    Nutrition-related concerns: Nausea/Vomitting and Other: acid reflux; dysphagia / ondynophagia, altered taste    Current PO intake: very soft foods, mashed foods (mashed potatoes, cut up spaghetti, soups)     Current Nutrition Supplement intake: None    Current EN RX: Osmolite 1.5 bolus 1 can BID with 120ml h20 flush with each feed    Consult or Intervention:   Spoke with pt during radiation oncology. Pt reports she continues to experience belching / acid reflux - is taking Tums to help this. She also reports nausea and is taking anti-emetics prn, however, she continues to have nausea. Recommend considering anti-emetics scheduled if medically appropriate.   Pt is infusing EN via bolus method, but reports some nausea after EN infusion.  Pt tried gravity bag method for EN and this did help nausea, however, states this \"took too long\" to infuse. Spoke with pt regarding benefit of gravity method and encouraged pt to try this again with some adjustment to flow rate. Pt v/u. Encouraged pt to contact oncology RD if this method is preferred so EN order can be changed.   Discussed importance of increasing EN to TID to help meet nutrition needs. Also discussed ways to fortify current foods as well as " other options to try that are higher kcal/high protein but very soft. Pt v/u.   Pt is using homemade mouthrinse.     Nutrition Focused Physical Findings:   Malnutrition Severity Assessment     Row Name 04/14/23 1005          Malnutrition Severity Assessment    Malnutrition Type Chronic Disease - Related Malnutrition        Unintentional Weight Loss     Unintentional Weight Loss  Weight loss greater than 7.5% in three months        Muscle Loss    Temple Region Moderate - slight depression        Fat Loss    Orbital Region  Moderate -  somewhat hollowness, slightly dark circles                 Estimated Nutrition Needs:      Nutrition Diagnosis: Moderate malnutrition related to decreased ability to consume sufficient energy as evidenced by physiological causes increasing nutrient needs., hypermetabolic state., muscle wasting., fat loss. and inadequate energy intake.    Plan: Will follow up per oncology nutrition protocol    Electronically signed by:  Dennise Stone RD  04/14/23 10:18 EDT

## 2023-04-17 ENCOUNTER — HOSPITAL ENCOUNTER (OUTPATIENT)
Dept: RADIATION ONCOLOGY | Facility: HOSPITAL | Age: 63
Discharge: HOME | End: 2023-04-17

## 2023-04-17 ENCOUNTER — HOSPITAL ENCOUNTER (OUTPATIENT)
Dept: ONCOLOGY | Facility: HOSPITAL | Age: 63
Discharge: HOME | End: 2023-04-17
Admitting: INTERNAL MEDICINE
Payer: COMMERCIAL

## 2023-04-17 ENCOUNTER — OFFICE VISIT (OUTPATIENT)
Dept: ONCOLOGY | Facility: HOSPITAL | Age: 63
End: 2023-04-17
Payer: COMMERCIAL

## 2023-04-17 VITALS
TEMPERATURE: 97.2 F | RESPIRATION RATE: 18 BRPM | SYSTOLIC BLOOD PRESSURE: 150 MMHG | WEIGHT: 114.2 LBS | DIASTOLIC BLOOD PRESSURE: 60 MMHG | BODY MASS INDEX: 20.39 KG/M2 | HEART RATE: 98 BPM | OXYGEN SATURATION: 100 %

## 2023-04-17 VITALS
HEART RATE: 92 BPM | RESPIRATION RATE: 18 BRPM | TEMPERATURE: 97.2 F | WEIGHT: 114.2 LBS | SYSTOLIC BLOOD PRESSURE: 128 MMHG | DIASTOLIC BLOOD PRESSURE: 71 MMHG | BODY MASS INDEX: 20.23 KG/M2 | HEIGHT: 63 IN | OXYGEN SATURATION: 100 %

## 2023-04-17 DIAGNOSIS — Z45.2 ENCOUNTER FOR ADJUSTMENT OR MANAGEMENT OF VASCULAR ACCESS DEVICE: ICD-10-CM

## 2023-04-17 DIAGNOSIS — C02.9 PRIMARY TONGUE SQUAMOUS CELL CARCINOMA: Primary | ICD-10-CM

## 2023-04-17 DIAGNOSIS — C02.9 PRIMARY TONGUE SQUAMOUS CELL CARCINOMA: ICD-10-CM

## 2023-04-17 DIAGNOSIS — C01 PRIMARY SQUAMOUS CELL CARCINOMA OF BASE OF TONGUE: ICD-10-CM

## 2023-04-17 LAB
ALBUMIN SERPL-MCNC: 4 G/DL (ref 3.5–5.2)
ALBUMIN/GLOB SERPL: 1.3 G/DL
ALP SERPL-CCNC: 102 U/L (ref 39–117)
ALT SERPL W P-5'-P-CCNC: 30 U/L (ref 1–33)
ANION GAP SERPL CALCULATED.3IONS-SCNC: 10.5 MMOL/L (ref 5–15)
AST SERPL-CCNC: 21 U/L (ref 1–32)
BASOPHILS # BLD AUTO: 0.03 10*3/MM3 (ref 0–0.2)
BASOPHILS NFR BLD AUTO: 0.3 % (ref 0–1.5)
BILIRUB SERPL-MCNC: 0.2 MG/DL (ref 0–1.2)
BUN SERPL-MCNC: 18 MG/DL (ref 8–23)
BUN/CREAT SERPL: 24.3 (ref 7–25)
CALCIUM SPEC-SCNC: 9.4 MG/DL (ref 8.6–10.5)
CHLORIDE SERPL-SCNC: 99 MMOL/L (ref 98–107)
CO2 SERPL-SCNC: 23.5 MMOL/L (ref 22–29)
CREAT SERPL-MCNC: 0.74 MG/DL (ref 0.57–1)
DEPRECATED RDW RBC AUTO: 40.7 FL (ref 37–54)
EGFRCR SERPLBLD CKD-EPI 2021: 91 ML/MIN/1.73
EOSINOPHIL # BLD AUTO: 0.04 10*3/MM3 (ref 0–0.4)
EOSINOPHIL NFR BLD AUTO: 0.4 % (ref 0.3–6.2)
ERYTHROCYTE [DISTWIDTH] IN BLOOD BY AUTOMATED COUNT: 12.5 % (ref 12.3–15.4)
GLOBULIN UR ELPH-MCNC: 3 GM/DL
GLUCOSE SERPL-MCNC: 111 MG/DL (ref 65–99)
HCT VFR BLD AUTO: 30.8 % (ref 34–46.6)
HGB BLD-MCNC: 10.1 G/DL (ref 12–15.9)
IMM GRANULOCYTES # BLD AUTO: 0.06 10*3/MM3 (ref 0–0.05)
IMM GRANULOCYTES NFR BLD AUTO: 0.6 % (ref 0–0.5)
LYMPHOCYTES # BLD AUTO: 0.91 10*3/MM3 (ref 0.7–3.1)
LYMPHOCYTES NFR BLD AUTO: 8.5 % (ref 19.6–45.3)
MCH RBC QN AUTO: 28.9 PG (ref 26.6–33)
MCHC RBC AUTO-ENTMCNC: 32.8 G/DL (ref 31.5–35.7)
MCV RBC AUTO: 88.3 FL (ref 79–97)
MONOCYTES # BLD AUTO: 0.69 10*3/MM3 (ref 0.1–0.9)
MONOCYTES NFR BLD AUTO: 6.4 % (ref 5–12)
NEUTROPHILS NFR BLD AUTO: 8.97 10*3/MM3 (ref 1.7–7)
NEUTROPHILS NFR BLD AUTO: 83.8 % (ref 42.7–76)
PLATELET # BLD AUTO: 465 10*3/MM3 (ref 140–450)
PMV BLD AUTO: 9.1 FL (ref 6–12)
POTASSIUM SERPL-SCNC: 4.1 MMOL/L (ref 3.5–5.2)
PROT SERPL-MCNC: 7 G/DL (ref 6–8.5)
RAD ONC ARIA COURSE ID: NORMAL
RAD ONC ARIA COURSE INTENT: NORMAL
RAD ONC ARIA COURSE LAST TREATMENT DATE: NORMAL
RAD ONC ARIA COURSE START DATE: NORMAL
RAD ONC ARIA COURSE TREATMENT ELAPSED DAYS: 7
RAD ONC ARIA FIRST TREATMENT DATE: NORMAL
RAD ONC ARIA PLAN FRACTIONS TREATED TO DATE: 6
RAD ONC ARIA PLAN ID: NORMAL
RAD ONC ARIA PLAN PRESCRIBED DOSE PER FRACTION: 2 GY
RAD ONC ARIA PLAN PRIMARY REFERENCE POINT: NORMAL
RAD ONC ARIA PLAN TOTAL FRACTIONS PRESCRIBED: 35
RAD ONC ARIA PLAN TOTAL PRESCRIBED DOSE: 7000 CGY
RAD ONC ARIA REFERENCE POINT DOSAGE GIVEN TO DATE: 12 GY
RAD ONC ARIA REFERENCE POINT ID: NORMAL
RAD ONC ARIA REFERENCE POINT SESSION DOSAGE GIVEN: 2 GY
RBC # BLD AUTO: 3.49 10*6/MM3 (ref 3.77–5.28)
SODIUM SERPL-SCNC: 133 MMOL/L (ref 136–145)
WBC NRBC COR # BLD: 10.7 10*3/MM3 (ref 3.4–10.8)

## 2023-04-17 PROCEDURE — 0 HYDROCORTISONE SOD SUC (PF) 250 MG RECONSTITUTED SOLUTION: Performed by: INTERNAL MEDICINE

## 2023-04-17 PROCEDURE — 25010000002 DEXAMETHASONE SODIUM PHOSPHATE 120 MG/30ML SOLUTION: Performed by: INTERNAL MEDICINE

## 2023-04-17 PROCEDURE — 25010000002 HEPARIN LOCK FLUSH PER 10 UNITS: Performed by: INTERNAL MEDICINE

## 2023-04-17 PROCEDURE — 77427 RADIATION TX MANAGEMENT X5: CPT | Performed by: RADIOLOGY

## 2023-04-17 PROCEDURE — 77386: CPT | Performed by: RADIOLOGY

## 2023-04-17 PROCEDURE — 25010000002 DIPHENHYDRAMINE PER 50 MG: Performed by: INTERNAL MEDICINE

## 2023-04-17 PROCEDURE — 25010000002 PALONOSETRON PER 25 MCG: Performed by: INTERNAL MEDICINE

## 2023-04-17 PROCEDURE — 96375 TX/PRO/DX INJ NEW DRUG ADDON: CPT

## 2023-04-17 PROCEDURE — 96413 CHEMO IV INFUSION 1 HR: CPT

## 2023-04-17 PROCEDURE — 25010000002 CARBOPLATIN PER 50 MG: Performed by: INTERNAL MEDICINE

## 2023-04-17 PROCEDURE — 80053 COMPREHEN METABOLIC PANEL: CPT | Performed by: INTERNAL MEDICINE

## 2023-04-17 PROCEDURE — 96417 CHEMO IV INFUS EACH ADDL SEQ: CPT

## 2023-04-17 PROCEDURE — 85025 COMPLETE CBC W/AUTO DIFF WBC: CPT | Performed by: INTERNAL MEDICINE

## 2023-04-17 PROCEDURE — 25010000002 PACLITAXEL PER 1 MG: Performed by: INTERNAL MEDICINE

## 2023-04-17 PROCEDURE — 77014 CHG CT GUIDANCE RADIATION THERAPY FLDS PLACEMENT: CPT | Performed by: RADIOLOGY

## 2023-04-17 RX ORDER — ONDANSETRON HYDROCHLORIDE 8 MG/1
8 TABLET, FILM COATED ORAL 3 TIMES DAILY PRN
Qty: 30 TABLET | Refills: 5 | Status: SHIPPED | OUTPATIENT
Start: 2023-04-17 | End: 2023-04-17

## 2023-04-17 RX ORDER — PALONOSETRON 0.05 MG/ML
0.25 INJECTION, SOLUTION INTRAVENOUS ONCE
Status: COMPLETED | OUTPATIENT
Start: 2023-04-17 | End: 2023-04-17

## 2023-04-17 RX ORDER — HEPARIN SODIUM (PORCINE) LOCK FLUSH IV SOLN 100 UNIT/ML 100 UNIT/ML
500 SOLUTION INTRAVENOUS AS NEEDED
OUTPATIENT
Start: 2023-04-17

## 2023-04-17 RX ORDER — SODIUM CHLORIDE 0.9 % (FLUSH) 0.9 %
20 SYRINGE (ML) INJECTION AS NEEDED
Status: DISCONTINUED | OUTPATIENT
Start: 2023-04-17 | End: 2023-04-18 | Stop reason: HOSPADM

## 2023-04-17 RX ORDER — SODIUM CHLORIDE 9 MG/ML
250 INJECTION, SOLUTION INTRAVENOUS ONCE
Status: COMPLETED | OUTPATIENT
Start: 2023-04-17 | End: 2023-04-17

## 2023-04-17 RX ORDER — FAMOTIDINE 10 MG/ML
20 INJECTION, SOLUTION INTRAVENOUS ONCE
Status: COMPLETED | OUTPATIENT
Start: 2023-04-17 | End: 2023-04-17

## 2023-04-17 RX ORDER — SODIUM CHLORIDE 0.9 % (FLUSH) 0.9 %
20 SYRINGE (ML) INJECTION AS NEEDED
OUTPATIENT
Start: 2023-04-17

## 2023-04-17 RX ORDER — HEPARIN SODIUM (PORCINE) LOCK FLUSH IV SOLN 100 UNIT/ML 100 UNIT/ML
500 SOLUTION INTRAVENOUS AS NEEDED
Status: DISCONTINUED | OUTPATIENT
Start: 2023-04-17 | End: 2023-04-18 | Stop reason: HOSPADM

## 2023-04-17 RX ADMIN — Medication 20 ML: at 12:59

## 2023-04-17 RX ADMIN — HYDROCORTISONE SODIUM SUCCINATE 100 MG: 250 INJECTION, POWDER, FOR SOLUTION INTRAMUSCULAR; INTRAVENOUS at 11:40

## 2023-04-17 RX ADMIN — DIPHENHYDRAMINE HYDROCHLORIDE 50 MG: 50 INJECTION, SOLUTION INTRAMUSCULAR; INTRAVENOUS at 11:05

## 2023-04-17 RX ADMIN — CARBOPLATIN 180 MG: 10 INJECTION, SOLUTION INTRAVENOUS at 12:23

## 2023-04-17 RX ADMIN — DEXAMETHASONE SODIUM PHOSPHATE 12 MG: 4 INJECTION, SOLUTION INTRA-ARTICULAR; INTRALESIONAL; INTRAMUSCULAR; INTRAVENOUS; SOFT TISSUE at 10:44

## 2023-04-17 RX ADMIN — HEPARIN SODIUM (PORCINE) LOCK FLUSH IV SOLN 100 UNIT/ML 500 UNITS: 100 SOLUTION at 12:59

## 2023-04-17 RX ADMIN — PACLITAXEL 75 MG: 6 INJECTION, SOLUTION INTRAVENOUS at 11:27

## 2023-04-17 RX ADMIN — SODIUM CHLORIDE 250 ML: 9 INJECTION, SOLUTION INTRAVENOUS at 10:30

## 2023-04-17 RX ADMIN — FAMOTIDINE 20 MG: 10 INJECTION INTRAVENOUS at 10:40

## 2023-04-17 RX ADMIN — PALONOSETRON 0.25 MG: 0.05 INJECTION, SOLUTION INTRAVENOUS at 10:40

## 2023-04-17 NOTE — PROGRESS NOTES
Outpatient Nutrition Oncology Follow Up    Patient Name: Carina Serna  YOB: 1960  MRN: [id number]    Recommendation(s):   1 can Osmolite 1.5 via syringe TID + 120 mL h20 flush with each feeding (supplemental feeds, meets 50% of kcal, protein, fluid needs).  To meet 100% of nutrition & hydration needs, suggest 5 total cans/day of Osmolite 1.5 + 150 mL h20 flush 5 X day.    Reason for Consult:  Nutrition Referral, EN F/U       Today's Weight:   51.8 kg / 114#    Weight Change:  7# loss in the past week, however pt's wt has fluctuated between 119-126# over the past month.  Total wt decline 3.4% in 1 month.    Nutrition-related concerns: Nausea/Vomitting, Diarrhea, Early Satiety, Dysphagia/odynophagia and Taste Alterations    Current PO intake:   Very soft or mashed foods     Current Nutrition Supplement intake:  n/a    Current EN RX:  Osmolite 1.5, bolus feeds of 1-3 cans/day + 120 mL h20 flush with each feeding    Consult or Intervention:  Reports 1 day of N/V/D after receiving infusion last week, but subsided.  Pt's  was admitted to the hospital over the weekend due to a mini-stroke and therefore she was unable to administer her EN feeds as she usually does.  Pt has a good understanding of EN recommendations and plans to get back on schedule.      Nutrition Diagnosis: Moderate malnutrition related to Inability to consume sufficient energy as evidenced by physiological causes increasing nutrient needs., hypermetabolic state., inadequate energy intake., patient report. and inadequate EN .    Plan: Will follow up per oncology nutrition protocol

## 2023-04-17 NOTE — PROGRESS NOTES
Chief Complaint  Primary squamous cell carcinoma of base of tongue    Sarmad Cohen,*  Edilma Peraza APRN Subjective Shirley Avelina Serna presents to [facility]Y for base of tongue cancer.    Ms. Serna Is a very pleasant 62-year-old female who presents for annual medical oncology evaluation is due to new diagnosis of breast cancer.  She has a benign past medical history.  She has quit smoking.  She has been evaluated by Dr. Weiner with radiation oncology and was started on chemotherapy and radiation last week with Cisplatin. She is tolerating radiation so far. She states on D4 of chemo (Thursday) she had nausea and diarrhea but this was resolved by Friday. She denies any tinnitus, neuropathy, fevers, chills, headache, vision changes. She is using PEG tube for supplemental feeding. She is due for C2 chemo today. Switched to Carbo/Taxol due to Cisplatin shortage.     Oncology/Hematology History Overview Note   1/19/23: Referred to Dr. Escobar for evaluation after several months of right sided sore throat. She underwent flexible fiberoptic laryngoscopy revealing a mass at the right lateral base of tongue and glossotonsillar sulcus.  Pathology from biopsy revealed HPV negative, moderately differentiated squamous cell carcinoma.      1/25/2023: CT scan of the soft tissue neck demonstrated a 2.7 x 2.3 cm abnormal soft tissue density likely representing neoplasm centered at the lateral right oropharynx with extension toward the lingual tonsil.  There is a moderately suspicious rounded right level 2 lymph node measuring 0.7 cm in the short axis.  CT scan of the chest revealed a subcarinal lymph node at the upper limit of normal for size.      4/10/23: C1D1 Cisplatin weekly. First dose of radiation.     4/17/23: C2 chemo, switched to Carbo/taxol due to shortage of Cisplatin.      Primary tongue squamous cell carcinoma   1/19/2023 Initial Diagnosis    Primary  tongue squamous cell carcinoma (HCC)     4/10/2023 - 4/10/2023 Chemotherapy    OP HEAD & NECK CISplatin (Weekly) + XRT     4/24/2023 -  Chemotherapy    OP HEAD & NECK CARBOplatin / Fluorouracil CIV + XRT     Primary squamous cell carcinoma of base of tongue   2/24/2023 Initial Diagnosis    Primary squamous cell carcinoma of base of tongue     4/17/2023 Biopsy    OP HEAD & NECK PACLitaxel 50 mg/m2 / CARBOplatin AUC=2 (weekly) + XRT  Plan Provider: Alexa Connelly MD PhD  Treatment goal: Curative  Line of treatment: [No plan line of treatment]     Malignant neoplasm of base of tongue   3/30/2023 Initial Diagnosis    Malignant neoplasm of base of tongue (HCC)     3/30/2023 -  Radiation    RADIATION THERAPY Treatment Details (Noted on 3/30/2023)  Site: Head and Neck  Technique: IMRT  Goal: No goal specified  Planned Treatment Start Date: No planned start date specified         Review of Systems   Constitutional: Positive for fatigue.   HENT: Positive for sore throat.    All other systems reviewed and are negative.    Current Outpatient Medications on File Prior to Visit   Medication Sig Dispense Refill   • chlorhexidine (PERIDEX) 0.12 % solution RINSE AND GARGLE 15 ML BY MOUTH THREE TIMES DAILY SWISH AND SPIT. DO NOT SWALLOW (Patient not taking: Reported on 3/28/2023)     • diphenhydrAMINE 12.5 MG/5ML elixir 20 mL, aluminum-magnesium hydroxide-simethicone 400-400-40 MG/5ML suspension 20 mL, Lidocaine Viscous HCl 2 % solution 20 mL Swish and spit 15 mL Every 4 (Four) Hours As Needed for Mucositis. (Patient not taking: Reported on 3/30/2023) 300 mL 2   • HYDROcodone-acetaminophen (NORCO) 7.5-325 MG per tablet Take 1 tablet by mouth Every 6 (Six) Hours As Needed for Moderate Pain. 60 tablet 0   • ibuprofen (ADVIL,MOTRIN) 600 MG tablet TAKE 1 TABLET BY MOUTH EVERY 6 HOURS. NOT TO EXCEED 3200 MG DAILY     • Nystatin-Diphenhydramine-Hydrocortisone-Lidocaine Take 5 mL by mouth Every 3 (Three) Hours As Needed (5 ml PO  swish/swallow q 3 hrs PRN for sore throat or trouble swallowing). 300 mL 2   • Nystatin-Diphenhydramine-Hydrocortisone-Lidocaine Swish and swallow 5 mL Every 3 (Three) Hours As Needed for sore throat and trouble swallowing 300 mL 2   • OLANZapine (zyPREXA) 5 MG tablet Take 1 tablet by mouth Every Night. Take on days 2, 3 and 4 after chemotherapy. (Patient not taking: Reported on 3/30/2023) 3 tablet 5   • ondansetron (ZOFRAN) 8 MG tablet Take 1 tablet by mouth 3 (Three) Times a Day As Needed for Nausea or Vomiting. (Patient not taking: Reported on 3/30/2023) 30 tablet 5     No current facility-administered medications on file prior to visit.       No Known Allergies  Past Medical History:   Diagnosis Date   • Dental disease    • PONV (postoperative nausea and vomiting)    • Primary squamous cell carcinoma of base of tongue     NEWLY DIAGNOSED AND NO TYPE OF TREATMENT DONE THUS FAR 2/27/23   • Sore throat      Past Surgical History:   Procedure Laterality Date   • BLADDER SURGERY     • DIRECT LARYNGOSCOPY, ESOPHAGOSCOPY, BRONCHOSCOPY N/A 02/10/2023    Procedure: DIRECT LARYNGOSCOPY, ESOPHAGOSCOPY, BRONCHOSCOPY;  Surgeon: Migel Escobar MD;  Location: [facility];  Service: ENT;  Laterality: N/A;   • EYE SURGERY      CATARACT REMOVED BILATERALLY   • HYSTERECTOMY     • PEG TUBE INSERTION N/A 2/28/2023    Procedure: PERCUTANEOUS ENDOSCOPIC GASTROSTOMY TUBE INSERTION;  Surgeon: Frank Ellington MD;  Location: [facility];  Service: General;  Laterality: N/A;   • TONSILLECTOMY     • VENOUS ACCESS DEVICE (PORT) INSERTION N/A 2/28/2023    Procedure: INSERTION OF PORTACATH ,insertion of percutaneoous endoscopic gastrostomy tube;  Surgeon: Frank Ellington MD;  Location: [facility] OR;  Service: General;  Laterality: N/A;     Social History     Socioeconomic History   • Marital status:    Tobacco Use   • Smoking status: Former     Packs/day: 1.00     Years: 30.00     Pack years: 30.00     Types: Cigarettes      Quit date: 2023     Years since quittin.2   • Smokeless tobacco: Never   Vaping Use   • Vaping Use: Never used   Substance and Sexual Activity   • Alcohol use: Not Currently   • Drug use: Never   • Sexual activity: Defer     Family History   Problem Relation Age of Onset   • Cancer Mother         Colon cancer   • Heart attack Father    • Colon cancer Maternal Aunt    • Malig Hyperthermia Neg Hx        Objective   Physical Exam    Well appearing patient in no acute distress on RA  Anicteric sclerae, no rash on exposed skin  Respirations non-labored  Awake, alert, and oriented x 4. Speech intact. No gross neurologic deficit  Appropriate mood and affect    ECOG 0.    Vitals:    23 0934   BP: 150/60   Pulse: 98   Resp: 18   Temp: 97.2 °F (36.2 °C)   TempSrc: Temporal   SpO2: 100%   Weight: 51.8 kg (114 lb 3.2 oz)               PHQ-9 Total Score:         The patient is not  experiencing fatigue.   PT/OT Functional Screening: PT fx screen: No needs identified  Speech Functional Screening: Speech fx screen: No needs identified  Rehab to be ordered: Rehab to be ordered: No needs identified      Result Review :   The following data was reviewed by: Sarmad Cohen MD on 23:  Lab Results   Component Value Date    HGB 10.1 (L) 2023    HCT 30.8 (L) 2023    MCV 88.3 2023     (H) 2023    WBC 10.70 2023    NEUTROABS 8.97 (H) 2023    LYMPHSABS 0.91 2023    MONOSABS 0.69 2023    EOSABS 0.04 2023    BASOSABS 0.03 2023     Lab Results   Component Value Date    GLUCOSE 111 (H) 2023    BUN 18 2023    CREATININE 0.74 2023     (L) 2023    K 4.1 2023    CL 99 2023    CO2 23.5 2023    CALCIUM 9.4 2023    PROTEINTOT 7.0 2023    ALBUMIN 4.0 2023    BILITOT 0.2 2023    ALKPHOS 102 2023    AST 21 2023    ALT 30 2023     Lab Results   Component Value Date    MG 2.0  04/10/2023     Labs personally reviewed. Mild anemia present.        No radiology results for the last 30 days.        Assessment and Plan    Diagnoses and all orders for this visit:    1. Primary tongue squamous cell carcinoma  -     Discontinue: ondansetron (ZOFRAN) 8 MG tablet; Take 1 tablet by mouth 3 (Three) Times a Day As Needed for Nausea or Vomiting.  Dispense: 30 tablet; Refill: 5    Ms. Serna has biopsy proven HPV negative squamous cell carcinoma of right base of tongue.  Per CT imaging with contrast that has been obtained she has possible right-sided level 2 lymph node measuring 0.7 cm.  3/6/23 PET scan confirmed FDG avidity of lymph nodes involved. She has met with Dr. Weiner with radiation oncology. Patient has had plate placed in jaw so that she is able to start treatment.  Plan for concurrent chemoradiation with curative intent. Port and PEG placed. Treatment started on 4/10/23. Cycle 1 given with weekly Cisplatin, however due to shortage chemotherapy to be with Carboplatin/Paclitaxel. Consent obtained today.     C1 Cisplatin 4/10/23. First dose of radiation    C2 chemo Carbp/Taxol (Switched due to Cisplatin shortage) today 4/17/23. Labs appropriate to proceed. Anti-emetics provided for PRN use. No need for day 2 through 4 Olanzapine will be prescribed.      This is an acute or chronic illness that poses a threat to life or bodily function. The above treatment plan involves a high risk of complications and/or mortality of patient management. Continue with labs to monitor for severe toxicities.     I spent 20 minutes caring for Carina on this date of service. This time includes time spent by me in the following activities:preparing for the visit, reviewing tests, obtaining and/or reviewing a separately obtained history, performing a medically appropriate examination and/or evaluation , counseling and educating the patient/family/caregiver, ordering medications, tests, or procedures, referring and  communicating with other health care professionals , documenting information in the medical record, independently interpreting results and communicating that information with the patient/family/caregiver and care coordination    Patient Follow Up: Prior to C3 chemo  Patient was given instructions and counseling regarding her condition or for health maintenance advice. Please see specific information pulled into the AVS if appropriate.

## 2023-04-17 NOTE — CODE DOCUMENTATION
"Started Taxol 1127  1135 Pt c/o dizziness, \"seeing stars\" SOA, face and chest red, stopped infusion, VS: /90 P 93 O2 93%, oxygen applied at 2LNC, MD informed and came to POD, orders for SoluCortef 100mg IVP given  1145 Pt feeling better, VS: /82 P 90 O2 96%  Per MD D/C Taxol, continue with Carboplatin, follow up next week.  "

## 2023-04-18 ENCOUNTER — HOSPITAL ENCOUNTER (OUTPATIENT)
Dept: RADIATION ONCOLOGY | Facility: HOSPITAL | Age: 63
Discharge: HOME | End: 2023-04-18

## 2023-04-18 VITALS
DIASTOLIC BLOOD PRESSURE: 88 MMHG | RESPIRATION RATE: 16 BRPM | BODY MASS INDEX: 21.02 KG/M2 | HEART RATE: 82 BPM | TEMPERATURE: 97.8 F | OXYGEN SATURATION: 97 % | SYSTOLIC BLOOD PRESSURE: 170 MMHG | WEIGHT: 117.73 LBS

## 2023-04-18 DIAGNOSIS — C01 PRIMARY SQUAMOUS CELL CARCINOMA OF BASE OF TONGUE: Primary | ICD-10-CM

## 2023-04-18 LAB
RAD ONC ARIA COURSE ID: NORMAL
RAD ONC ARIA COURSE INTENT: NORMAL
RAD ONC ARIA COURSE LAST TREATMENT DATE: NORMAL
RAD ONC ARIA COURSE START DATE: NORMAL
RAD ONC ARIA COURSE TREATMENT ELAPSED DAYS: 8
RAD ONC ARIA FIRST TREATMENT DATE: NORMAL
RAD ONC ARIA PLAN FRACTIONS TREATED TO DATE: 7
RAD ONC ARIA PLAN ID: NORMAL
RAD ONC ARIA PLAN PRESCRIBED DOSE PER FRACTION: 2 GY
RAD ONC ARIA PLAN PRIMARY REFERENCE POINT: NORMAL
RAD ONC ARIA PLAN TOTAL FRACTIONS PRESCRIBED: 35
RAD ONC ARIA PLAN TOTAL PRESCRIBED DOSE: 7000 CGY
RAD ONC ARIA REFERENCE POINT DOSAGE GIVEN TO DATE: 14 GY
RAD ONC ARIA REFERENCE POINT ID: NORMAL
RAD ONC ARIA REFERENCE POINT SESSION DOSAGE GIVEN: 2 GY

## 2023-04-18 PROCEDURE — 77386: CPT | Performed by: RADIOLOGY

## 2023-04-18 PROCEDURE — 77014 CHG CT GUIDANCE RADIATION THERAPY FLDS PLACEMENT: CPT | Performed by: RADIOLOGY

## 2023-04-18 RX ORDER — ONDANSETRON HYDROCHLORIDE 8 MG/1
TABLET, FILM COATED ORAL
COMMUNITY
Start: 2023-04-17

## 2023-04-18 NOTE — PROGRESS NOTES
On Treatment Visit       Patient: Carina Serna   YOB: 1960   Medical Record Number: [id number]     Date of Visit  April 18, 2023   Primary Diagnosis:Primary squamous cell carcinoma of base of tongue [C01]  Cancer Staging: Cancer Staging   No matching staging information was found for the patient.       was seen today for an on treatment visit.  She is receiving radiation therapy to the head and neck. She  has received 1400 cGy in 7 fractions out of a planned dose of 7000 cGy in 35 fractions. She is currently receiving concurrent cisplatin chemotherapy per Dr. Cohen.     Sore throat but feels as though mass is shrinking                                       Review of Systems:   Review of Systems   Constitutional: Positive for fatigue (3/10). Negative for appetite change and unexpected weight change.        Enteral nutrition 2-3 cans per day     HENT: Positive for ear pain (right ear hurts throughout the day comes and goes ), sore throat (often), tinnitus (occasional) and trouble swallowing (often).         Xerostomia dysgeusia      Eyes: Negative for visual disturbance.   Respiratory: Negative for cough and shortness of breath.    Gastrointestinal: Positive for nausea (occaisonal). Negative for constipation, diarrhea and vomiting.   Genitourinary: Negative for difficulty urinating, dysuria, frequency and urgency.   Musculoskeletal: Negative for back pain and joint swelling.   Skin: Negative for color change and rash.   Neurological: Negative for dizziness, light-headedness and headaches.   Psychiatric/Behavioral: Positive for sleep disturbance (toss and turns ). Negative for agitation.       Vitals:     Vitals:    04/18/23 1038   BP: 170/88   Pulse: 82   Resp: 16   Temp: 97.8 °F (36.6 °C)   SpO2: 97%       Weight:   Wt Readings from Last 3 Encounters:   04/18/23 53.4 kg (117 lb 11.6 oz)   04/17/23 51.8 kg (114 lb 3.2 oz)   04/17/23 51.8 kg (114 lb 3.2 oz)      Pain:    Pain Score     04/18/23 1038   PainSc:   3   PainLoc: Throat         Physical Exam:  Gen: WD/WN; NAD  HEENT: MMM  Trachea: midline  Chest: symmetric  Resp: normal respiratory effort  Extr: warm, well-perfused  Neuro: awake and alert; no aphasia or neglect    Plan: I have reviewed treatment setup notes, checked and approved the daily guidance images.  I reviewed dose delivery, treatment parameters and deemed them appropriate. We plan to continue radiation therapy as prescribed.          Radiation Oncology    Electronically signed by Nikolai Weiner MD 4/18/2023  12:59 EDT

## 2023-04-19 ENCOUNTER — HOSPITAL ENCOUNTER (OUTPATIENT)
Dept: RADIATION ONCOLOGY | Facility: HOSPITAL | Age: 63
Discharge: HOME | End: 2023-04-19

## 2023-04-19 VITALS — WEIGHT: 117.73 LBS | BODY MASS INDEX: 21.02 KG/M2

## 2023-04-19 LAB
RAD ONC ARIA COURSE ID: NORMAL
RAD ONC ARIA COURSE INTENT: NORMAL
RAD ONC ARIA COURSE LAST TREATMENT DATE: NORMAL
RAD ONC ARIA COURSE START DATE: NORMAL
RAD ONC ARIA COURSE TREATMENT ELAPSED DAYS: 9
RAD ONC ARIA FIRST TREATMENT DATE: NORMAL
RAD ONC ARIA PLAN FRACTIONS TREATED TO DATE: 8
RAD ONC ARIA PLAN ID: NORMAL
RAD ONC ARIA PLAN PRESCRIBED DOSE PER FRACTION: 2 GY
RAD ONC ARIA PLAN PRIMARY REFERENCE POINT: NORMAL
RAD ONC ARIA PLAN TOTAL FRACTIONS PRESCRIBED: 35
RAD ONC ARIA PLAN TOTAL PRESCRIBED DOSE: 7000 CGY
RAD ONC ARIA REFERENCE POINT DOSAGE GIVEN TO DATE: 16 GY
RAD ONC ARIA REFERENCE POINT ID: NORMAL
RAD ONC ARIA REFERENCE POINT SESSION DOSAGE GIVEN: 2 GY

## 2023-04-19 PROCEDURE — 77386: CPT | Performed by: RADIOLOGY

## 2023-04-19 PROCEDURE — 77014 CHG CT GUIDANCE RADIATION THERAPY FLDS PLACEMENT: CPT | Performed by: RADIOLOGY

## 2023-04-19 NOTE — PROGRESS NOTES
Outpatient Nutrition Oncology Follow Up    Patient Name: Carina Serna  YOB: 1960  MRN: [id number]    Recommendation(s):     For supplemental feeds:  1 can Osmolite 1.5 via syringe TID + 120 mL h20 flush with each feeding (meets 50% of kcal, protein, fluid needs).    To meet 100% of nutrition & hydration needs, suggest 5 total cans/day of Osmolite 1.5 + 150 mL h20 flush 5 X day.      Reason for Consult:  EN f/u, Radiation tx f/u       Today's Weight:  53.4 kg    Weight Change:  .4 kg gain from yesterday 4/18/23; 3# gain from 4/17/23    Nutrition-related concerns: Dysphagia/odynophagia, Taste Alterations and Xerostomia    Current PO intake:  Very soft or mashed foods    Current EN RX:  Pt back up to 3 total cans/day of Osmolite 1.5 bolus feedings (+120 mL h20 flush TID)    Consult or Intervention:   Pt reports she is doing well today, however experienced a reaction to the chemo infusion on Monday 4/17/23.  Her  is home from the hospital now.  She reports being able to give 3 feedings (syringe) per day now.  Wt gain noted.  No intolerance reported with EN.      Nutrition Diagnosis: Moderate malnutrition related to increased nutrient needs due to catabolic disease as evidenced by physiological causes increasing nutrient needs.    Plan: Will follow up per oncology nutrition protocol

## 2023-04-20 ENCOUNTER — TELEPHONE (OUTPATIENT)
Dept: ONCOLOGY | Facility: HOSPITAL | Age: 63
End: 2023-04-20

## 2023-04-20 ENCOUNTER — APPOINTMENT (OUTPATIENT)
Dept: RADIATION ONCOLOGY | Facility: HOSPITAL | Age: 63
End: 2023-04-20
Payer: COMMERCIAL

## 2023-04-20 ENCOUNTER — HOSPITAL ENCOUNTER (OUTPATIENT)
Dept: RADIATION ONCOLOGY | Facility: HOSPITAL | Age: 63
Discharge: HOME | End: 2023-04-20

## 2023-04-20 LAB
RAD ONC ARIA COURSE ID: NORMAL
RAD ONC ARIA COURSE INTENT: NORMAL
RAD ONC ARIA COURSE LAST TREATMENT DATE: NORMAL
RAD ONC ARIA COURSE START DATE: NORMAL
RAD ONC ARIA COURSE TREATMENT ELAPSED DAYS: 10
RAD ONC ARIA FIRST TREATMENT DATE: NORMAL
RAD ONC ARIA PLAN FRACTIONS TREATED TO DATE: 9
RAD ONC ARIA PLAN ID: NORMAL
RAD ONC ARIA PLAN PRESCRIBED DOSE PER FRACTION: 2 GY
RAD ONC ARIA PLAN PRIMARY REFERENCE POINT: NORMAL
RAD ONC ARIA PLAN TOTAL FRACTIONS PRESCRIBED: 35
RAD ONC ARIA PLAN TOTAL PRESCRIBED DOSE: 7000 CGY
RAD ONC ARIA REFERENCE POINT DOSAGE GIVEN TO DATE: 18 GY
RAD ONC ARIA REFERENCE POINT ID: NORMAL
RAD ONC ARIA REFERENCE POINT SESSION DOSAGE GIVEN: 2 GY

## 2023-04-20 PROCEDURE — 77386: CPT | Performed by: RADIOLOGY

## 2023-04-20 PROCEDURE — 77014 CHG CT GUIDANCE RADIATION THERAPY FLDS PLACEMENT: CPT | Performed by: RADIOLOGY

## 2023-04-20 NOTE — TELEPHONE ENCOUNTER
Caller: Carina Serna    Relationship to patient: Self    Best call back number: [phone]    Chief complaint: NEED TO RESCHEDULE CANCELLED APPTS     Type of visit: INFUSION AND FOLLOW UP    Requested date: 04/24 WOULD LIKE TO SCHEDULE AFTER RADIATION APPT AT 9:45AM     If rescheduling, when is the original appointment: 04/24    Additional notes:

## 2023-04-21 ENCOUNTER — HOSPITAL ENCOUNTER (OUTPATIENT)
Dept: RADIATION ONCOLOGY | Facility: HOSPITAL | Age: 63
Discharge: HOME | End: 2023-04-21

## 2023-04-21 LAB
RAD ONC ARIA COURSE ID: NORMAL
RAD ONC ARIA COURSE INTENT: NORMAL
RAD ONC ARIA COURSE LAST TREATMENT DATE: NORMAL
RAD ONC ARIA COURSE START DATE: NORMAL
RAD ONC ARIA COURSE TREATMENT ELAPSED DAYS: 11
RAD ONC ARIA FIRST TREATMENT DATE: NORMAL
RAD ONC ARIA PLAN FRACTIONS TREATED TO DATE: 10
RAD ONC ARIA PLAN ID: NORMAL
RAD ONC ARIA PLAN PRESCRIBED DOSE PER FRACTION: 2 GY
RAD ONC ARIA PLAN PRIMARY REFERENCE POINT: NORMAL
RAD ONC ARIA PLAN TOTAL FRACTIONS PRESCRIBED: 35
RAD ONC ARIA PLAN TOTAL PRESCRIBED DOSE: 7000 CGY
RAD ONC ARIA REFERENCE POINT DOSAGE GIVEN TO DATE: 20 GY
RAD ONC ARIA REFERENCE POINT ID: NORMAL
RAD ONC ARIA REFERENCE POINT SESSION DOSAGE GIVEN: 2 GY

## 2023-04-21 PROCEDURE — 77386: CPT | Performed by: RADIOLOGY

## 2023-04-21 PROCEDURE — 77336 RADIATION PHYSICS CONSULT: CPT | Performed by: RADIOLOGY

## 2023-04-21 PROCEDURE — 77014 CHG CT GUIDANCE RADIATION THERAPY FLDS PLACEMENT: CPT | Performed by: RADIOLOGY

## 2023-04-24 ENCOUNTER — HOSPITAL ENCOUNTER (OUTPATIENT)
Dept: ONCOLOGY | Facility: HOSPITAL | Age: 63
Discharge: HOME | End: 2023-04-24
Admitting: INTERNAL MEDICINE
Payer: COMMERCIAL

## 2023-04-24 ENCOUNTER — OFFICE VISIT (OUTPATIENT)
Dept: ONCOLOGY | Facility: HOSPITAL | Age: 63
End: 2023-04-24
Payer: COMMERCIAL

## 2023-04-24 ENCOUNTER — HOSPITAL ENCOUNTER (OUTPATIENT)
Dept: RADIATION ONCOLOGY | Facility: HOSPITAL | Age: 63
Discharge: HOME | End: 2023-04-24

## 2023-04-24 VITALS
WEIGHT: 115.96 LBS | SYSTOLIC BLOOD PRESSURE: 150 MMHG | RESPIRATION RATE: 18 BRPM | HEART RATE: 75 BPM | DIASTOLIC BLOOD PRESSURE: 64 MMHG | TEMPERATURE: 97.6 F | BODY MASS INDEX: 20.7 KG/M2 | OXYGEN SATURATION: 100 %

## 2023-04-24 VITALS
BODY MASS INDEX: 20.7 KG/M2 | HEART RATE: 75 BPM | WEIGHT: 115.96 LBS | TEMPERATURE: 97.6 F | OXYGEN SATURATION: 100 % | SYSTOLIC BLOOD PRESSURE: 150 MMHG | DIASTOLIC BLOOD PRESSURE: 64 MMHG | RESPIRATION RATE: 20 BRPM

## 2023-04-24 DIAGNOSIS — C02.9 PRIMARY TONGUE SQUAMOUS CELL CARCINOMA: Primary | ICD-10-CM

## 2023-04-24 DIAGNOSIS — D64.81 ANEMIA ASSOCIATED WITH CHEMOTHERAPY: ICD-10-CM

## 2023-04-24 DIAGNOSIS — T45.1X5A ANEMIA ASSOCIATED WITH CHEMOTHERAPY: ICD-10-CM

## 2023-04-24 LAB
ALBUMIN SERPL-MCNC: 4.1 G/DL (ref 3.5–5.2)
ALBUMIN/GLOB SERPL: 1.4 G/DL
ALP SERPL-CCNC: 91 U/L (ref 39–117)
ALT SERPL W P-5'-P-CCNC: 23 U/L (ref 1–33)
ANION GAP SERPL CALCULATED.3IONS-SCNC: 12 MMOL/L (ref 5–15)
AST SERPL-CCNC: 16 U/L (ref 1–32)
BASOPHILS # BLD AUTO: 0.03 10*3/MM3 (ref 0–0.2)
BASOPHILS NFR BLD AUTO: 0.5 % (ref 0–1.5)
BILIRUB SERPL-MCNC: 0.2 MG/DL (ref 0–1.2)
BUN SERPL-MCNC: 13 MG/DL (ref 8–23)
BUN/CREAT SERPL: 18.8 (ref 7–25)
CALCIUM SPEC-SCNC: 9.6 MG/DL (ref 8.6–10.5)
CHLORIDE SERPL-SCNC: 100 MMOL/L (ref 98–107)
CO2 SERPL-SCNC: 23 MMOL/L (ref 22–29)
CREAT SERPL-MCNC: 0.69 MG/DL (ref 0.57–1)
DEPRECATED RDW RBC AUTO: 40.8 FL (ref 37–54)
EGFRCR SERPLBLD CKD-EPI 2021: 97.7 ML/MIN/1.73
EOSINOPHIL # BLD AUTO: 0.04 10*3/MM3 (ref 0–0.4)
EOSINOPHIL NFR BLD AUTO: 0.7 % (ref 0.3–6.2)
ERYTHROCYTE [DISTWIDTH] IN BLOOD BY AUTOMATED COUNT: 12.6 % (ref 12.3–15.4)
GLOBULIN UR ELPH-MCNC: 2.9 GM/DL
GLUCOSE SERPL-MCNC: 103 MG/DL (ref 65–99)
HCT VFR BLD AUTO: 28.9 % (ref 34–46.6)
HGB BLD-MCNC: 9.5 G/DL (ref 12–15.9)
IMM GRANULOCYTES # BLD AUTO: 0.01 10*3/MM3 (ref 0–0.05)
IMM GRANULOCYTES NFR BLD AUTO: 0.2 % (ref 0–0.5)
LYMPHOCYTES # BLD AUTO: 0.77 10*3/MM3 (ref 0.7–3.1)
LYMPHOCYTES NFR BLD AUTO: 13.1 % (ref 19.6–45.3)
MCH RBC QN AUTO: 29.6 PG (ref 26.6–33)
MCHC RBC AUTO-ENTMCNC: 32.9 G/DL (ref 31.5–35.7)
MCV RBC AUTO: 90 FL (ref 79–97)
MONOCYTES # BLD AUTO: 0.4 10*3/MM3 (ref 0.1–0.9)
MONOCYTES NFR BLD AUTO: 6.8 % (ref 5–12)
NEUTROPHILS NFR BLD AUTO: 4.62 10*3/MM3 (ref 1.7–7)
NEUTROPHILS NFR BLD AUTO: 78.7 % (ref 42.7–76)
PLATELET # BLD AUTO: 317 10*3/MM3 (ref 140–450)
PMV BLD AUTO: 9.5 FL (ref 6–12)
POTASSIUM SERPL-SCNC: 4.2 MMOL/L (ref 3.5–5.2)
PROT SERPL-MCNC: 7 G/DL (ref 6–8.5)
RAD ONC ARIA COURSE ID: NORMAL
RAD ONC ARIA COURSE INTENT: NORMAL
RAD ONC ARIA COURSE LAST TREATMENT DATE: NORMAL
RAD ONC ARIA COURSE START DATE: NORMAL
RAD ONC ARIA COURSE TREATMENT ELAPSED DAYS: 14
RAD ONC ARIA FIRST TREATMENT DATE: NORMAL
RAD ONC ARIA PLAN FRACTIONS TREATED TO DATE: 11
RAD ONC ARIA PLAN ID: NORMAL
RAD ONC ARIA PLAN PRESCRIBED DOSE PER FRACTION: 2 GY
RAD ONC ARIA PLAN PRIMARY REFERENCE POINT: NORMAL
RAD ONC ARIA PLAN TOTAL FRACTIONS PRESCRIBED: 35
RAD ONC ARIA PLAN TOTAL PRESCRIBED DOSE: 7000 CGY
RAD ONC ARIA REFERENCE POINT DOSAGE GIVEN TO DATE: 22 GY
RAD ONC ARIA REFERENCE POINT ID: NORMAL
RAD ONC ARIA REFERENCE POINT SESSION DOSAGE GIVEN: 2 GY
RBC # BLD AUTO: 3.21 10*6/MM3 (ref 3.77–5.28)
SODIUM SERPL-SCNC: 135 MMOL/L (ref 136–145)
WBC NRBC COR # BLD: 5.87 10*3/MM3 (ref 3.4–10.8)

## 2023-04-24 PROCEDURE — 96413 CHEMO IV INFUSION 1 HR: CPT

## 2023-04-24 PROCEDURE — 25010000002 CARBOPLATIN PER 50 MG: Performed by: INTERNAL MEDICINE

## 2023-04-24 PROCEDURE — 25010000002 PALONOSETRON PER 25 MCG: Performed by: INTERNAL MEDICINE

## 2023-04-24 PROCEDURE — 80053 COMPREHEN METABOLIC PANEL: CPT | Performed by: INTERNAL MEDICINE

## 2023-04-24 PROCEDURE — 77386: CPT | Performed by: RADIOLOGY

## 2023-04-24 PROCEDURE — 85025 COMPLETE CBC W/AUTO DIFF WBC: CPT | Performed by: INTERNAL MEDICINE

## 2023-04-24 PROCEDURE — 25010000002 DEXAMETHASONE SODIUM PHOSPHATE 120 MG/30ML SOLUTION: Performed by: INTERNAL MEDICINE

## 2023-04-24 PROCEDURE — 77014 CHG CT GUIDANCE RADIATION THERAPY FLDS PLACEMENT: CPT | Performed by: RADIOLOGY

## 2023-04-24 PROCEDURE — 96375 TX/PRO/DX INJ NEW DRUG ADDON: CPT

## 2023-04-24 PROCEDURE — 25010000002 FLUOROURACIL PER 500 MG: Performed by: INTERNAL MEDICINE

## 2023-04-24 PROCEDURE — 96416 CHEMO PROLONG INFUSE W/PUMP: CPT

## 2023-04-24 RX ORDER — DIPHENHYDRAMINE HYDROCHLORIDE 50 MG/ML
50 INJECTION INTRAMUSCULAR; INTRAVENOUS AS NEEDED
Status: CANCELLED | OUTPATIENT
Start: 2023-04-24

## 2023-04-24 RX ORDER — DIPHENHYDRAMINE HYDROCHLORIDE 50 MG/ML
50 INJECTION INTRAMUSCULAR; INTRAVENOUS AS NEEDED
Status: CANCELLED | OUTPATIENT
Start: 2023-04-26

## 2023-04-24 RX ORDER — SODIUM CHLORIDE 9 MG/ML
250 INJECTION, SOLUTION INTRAVENOUS ONCE
Status: CANCELLED | OUTPATIENT
Start: 2023-04-24

## 2023-04-24 RX ORDER — FAMOTIDINE 10 MG/ML
20 INJECTION, SOLUTION INTRAVENOUS AS NEEDED
Status: CANCELLED | OUTPATIENT
Start: 2023-04-26

## 2023-04-24 RX ORDER — SODIUM CHLORIDE 9 MG/ML
250 INJECTION, SOLUTION INTRAVENOUS ONCE
Status: COMPLETED | OUTPATIENT
Start: 2023-04-24 | End: 2023-04-24

## 2023-04-24 RX ORDER — SODIUM CHLORIDE 9 MG/ML
250 INJECTION, SOLUTION INTRAVENOUS ONCE
Status: CANCELLED | OUTPATIENT
Start: 2023-04-27

## 2023-04-24 RX ORDER — FAMOTIDINE 10 MG/ML
20 INJECTION, SOLUTION INTRAVENOUS AS NEEDED
Status: CANCELLED | OUTPATIENT
Start: 2023-04-27

## 2023-04-24 RX ORDER — PALONOSETRON 0.05 MG/ML
0.25 INJECTION, SOLUTION INTRAVENOUS ONCE
Status: COMPLETED | OUTPATIENT
Start: 2023-04-24 | End: 2023-04-24

## 2023-04-24 RX ORDER — FAMOTIDINE 10 MG/ML
20 INJECTION, SOLUTION INTRAVENOUS AS NEEDED
Status: CANCELLED | OUTPATIENT
Start: 2023-04-25

## 2023-04-24 RX ORDER — FAMOTIDINE 10 MG/ML
20 INJECTION, SOLUTION INTRAVENOUS AS NEEDED
Status: CANCELLED | OUTPATIENT
Start: 2023-04-24

## 2023-04-24 RX ORDER — PALONOSETRON 0.05 MG/ML
0.25 INJECTION, SOLUTION INTRAVENOUS ONCE
Status: CANCELLED | OUTPATIENT
Start: 2023-04-24

## 2023-04-24 RX ORDER — SODIUM CHLORIDE 9 MG/ML
250 INJECTION, SOLUTION INTRAVENOUS ONCE
Status: CANCELLED | OUTPATIENT
Start: 2023-04-25

## 2023-04-24 RX ORDER — DIPHENHYDRAMINE HYDROCHLORIDE 50 MG/ML
50 INJECTION INTRAMUSCULAR; INTRAVENOUS AS NEEDED
Status: CANCELLED | OUTPATIENT
Start: 2023-04-25

## 2023-04-24 RX ORDER — SODIUM CHLORIDE 9 MG/ML
250 INJECTION, SOLUTION INTRAVENOUS ONCE
Status: CANCELLED | OUTPATIENT
Start: 2023-04-26

## 2023-04-24 RX ORDER — DIPHENHYDRAMINE HYDROCHLORIDE 50 MG/ML
50 INJECTION INTRAMUSCULAR; INTRAVENOUS AS NEEDED
Status: CANCELLED | OUTPATIENT
Start: 2023-04-27

## 2023-04-24 RX ADMIN — SODIUM CHLORIDE 250 ML: 9 INJECTION, SOLUTION INTRAVENOUS at 09:47

## 2023-04-24 RX ADMIN — PALONOSETRON 0.25 MG: 0.05 INJECTION, SOLUTION INTRAVENOUS at 09:57

## 2023-04-24 RX ADMIN — CARBOPLATIN 110 MG: 10 INJECTION, SOLUTION INTRAVENOUS at 10:23

## 2023-04-24 RX ADMIN — DEXAMETHASONE SODIUM PHOSPHATE 12 MG: 4 INJECTION, SOLUTION INTRA-ARTICULAR; INTRALESIONAL; INTRAMUSCULAR; INTRAVENOUS; SOFT TISSUE at 09:57

## 2023-04-24 RX ADMIN — FLUOROURACIL 3650 MG: 50 INJECTION, SOLUTION INTRAVENOUS at 11:01

## 2023-04-24 NOTE — PROGRESS NOTES
Chief Complaint  Primary squamous cell carcinoma of base of tongue    Provider, No Known  Edilma Peraza APRN Subjective Shirley Avelina Serna presents to [facility] for base of tongue cancer.    Ms. Serna Is a very pleasant 62-year-old female who presents for annual medical oncology evaluation is due to new diagnosis of breast cancer.  She has a benign past medical history.  She has quit smoking.  She has been evaluated by Dr. Weiner with radiation oncology and has beenstarted on chemotherapy and radiation. She got C2 chemo last week with Carboplatin and Paclitaxel, however this was complicated by reaction to taxol. She did receive Carboplatin. Due to this and shortage of Cisplatin, she will be switched to Carboplatin and 96h infusional 5-FU. Consent obtained. Due today for C3. She is tolerating radiation so far.  She denies any tinnitus, neuropathy, fevers, chills, headache, vision changes. She is using PEG tube for supplemental feeding.     Oncology/Hematology History Overview Note   1/19/23: Referred to Dr. Escobar for evaluation after several months of right sided sore throat. She underwent flexible fiberoptic laryngoscopy revealing a mass at the right lateral base of tongue and glossotonsillar sulcus.  Pathology from biopsy revealed HPV negative, moderately differentiated squamous cell carcinoma.      1/25/2023: CT scan of the soft tissue neck demonstrated a 2.7 x 2.3 cm abnormal soft tissue density likely representing neoplasm centered at the lateral right oropharynx with extension toward the lingual tonsil.  There is a moderately suspicious rounded right level 2 lymph node measuring 0.7 cm in the short axis.  CT scan of the chest revealed a subcarinal lymph node at the upper limit of normal for size.      3/6/23: NM PET: 1. Intense abnormal radiopharmaceutical accumulation corresponding to the soft tissue mass at the   right aspect of the oropharynx  consistent with malignancy.  2. Mildly increased radiopharmaceutical accumulation corresponding to lymph nodes along the   cervical/jugular chain bilaterally indicating developing malignant metastatic lymphadenopathy.    3. No evidence for additional malignancy or more distal metastatic disease.    4. Mild to moderate changes of emphysema/COPD are noted.  5. Coronary artery calcifications are observed.  6. Areas of ill-defined ground-glass density in the right upper lobe.  These findings may relate to   areas of scarring.  Developing infiltrates in the setting of right upper lobe pneumonia could be   considered.  Recommend correlation for signs or symptoms of acute infection.  Also recommend   follow-up CT of the chest in 6 months to document stability versus resolution.    4/10/23: C1D1 Cisplatin weekly. First dose of radiation.     4/17/23: C2 chemo, switched to Carbo/taxol due to shortage of Cisplatin. Comlicated by reaction to Taxol. She did receive Carboplatin    4/23/23: C3 chemo, switched to Carboplatin/infusional 5-FU       Primary tongue squamous cell carcinoma   1/19/2023 Initial Diagnosis    Primary tongue squamous cell carcinoma (HCC)     4/10/2023 - 4/10/2023 Chemotherapy    OP HEAD & NECK CISplatin (Weekly) + XRT     4/24/2023 -  Chemotherapy    OP HEAD & NECK CARBOplatin / Fluorouracil CIV + XRT     Primary squamous cell carcinoma of base of tongue   2/24/2023 Initial Diagnosis    Primary squamous cell carcinoma of base of tongue     4/17/2023 Biopsy    OP HEAD & NECK PACLitaxel 50 mg/m2 / CARBOplatin AUC=2 (weekly) + XRT  Plan Provider: Alexa Connelly MD PhD  Treatment goal: Curative  Line of treatment: [No plan line of treatment]     Malignant neoplasm of base of tongue (Resolved)   3/30/2023 Initial Diagnosis    Malignant neoplasm of base of tongue (HCC)     3/30/2023 -  Radiation    RADIATION THERAPY Treatment Details (Noted on 3/30/2023)  Site: Head and Neck  Technique: IMRT  Goal: No goal  specified  Planned Treatment Start Date: No planned start date specified         Review of Systems   Constitutional: Positive for fatigue.   HENT: Positive for sore throat.    All other systems reviewed and are negative.    Current Outpatient Medications on File Prior to Visit   Medication Sig Dispense Refill   • diphenhydrAMINE 12.5 MG/5ML elixir 20 mL, aluminum-magnesium hydroxide-simethicone 400-400-40 MG/5ML suspension 20 mL, Lidocaine Viscous HCl 2 % solution 20 mL Swish and spit 15 mL Every 4 (Four) Hours As Needed for Mucositis. 300 mL 2   • HYDROcodone-acetaminophen (NORCO) 7.5-325 MG per tablet Take 1 tablet by mouth Every 6 (Six) Hours As Needed for Moderate Pain. 60 tablet 0   • ibuprofen (ADVIL,MOTRIN) 600 MG tablet TAKE 1 TABLET BY MOUTH EVERY 6 HOURS. NOT TO EXCEED 3200 MG DAILY     • Nystatin-Diphenhydramine-Hydrocortisone-Lidocaine Take 5 mL by mouth Every 3 (Three) Hours As Needed (5 ml PO swish/swallow q 3 hrs PRN for sore throat or trouble swallowing). 300 mL 2   • Nystatin-Diphenhydramine-Hydrocortisone-Lidocaine Swish and swallow 5 mL Every 3 (Three) Hours As Needed for sore throat and trouble swallowing 300 mL 2   • ondansetron (ZOFRAN) 8 MG tablet      • chlorhexidine (PERIDEX) 0.12 % solution RINSE AND GARGLE 15 ML BY MOUTH THREE TIMES DAILY SWISH AND SPIT. DO NOT SWALLOW (Patient not taking: Reported on 4/24/2023)     • [DISCONTINUED] OLANZapine (zyPREXA) 5 MG tablet Take 1 tablet by mouth Every Night. Take on days 2, 3 and 4 after chemotherapy. (Patient not taking: Reported on 3/30/2023) 3 tablet 5     No current facility-administered medications on file prior to visit.       Allergies   Allergen Reactions   • Paclitaxel Anaphylaxis     Past Medical History:   Diagnosis Date   • Dental disease    • PONV (postoperative nausea and vomiting)    • Primary squamous cell carcinoma of base of tongue     NEWLY DIAGNOSED AND NO TYPE OF TREATMENT DONE THUS FAR 2/27/23   • Sore throat      Past  Surgical History:   Procedure Laterality Date   • BLADDER SURGERY     • DIRECT LARYNGOSCOPY, ESOPHAGOSCOPY, BRONCHOSCOPY N/A 02/10/2023    Procedure: DIRECT LARYNGOSCOPY, ESOPHAGOSCOPY, BRONCHOSCOPY;  Surgeon: Migel Escobar MD;  Location: [facility];  Service: ENT;  Laterality: N/A;   • EYE SURGERY      CATARACT REMOVED BILATERALLY   • HYSTERECTOMY     • PEG TUBE INSERTION N/A 2023    Procedure: PERCUTANEOUS ENDOSCOPIC GASTROSTOMY TUBE INSERTION;  Surgeon: Frank Ellington MD;  Location: [facility] OR;  Service: General;  Laterality: N/A;   • TONSILLECTOMY     • VENOUS ACCESS DEVICE (PORT) INSERTION N/A 2023    Procedure: INSERTION OF PORTACATH ,insertion of percutaneoous endoscopic gastrostomy tube;  Surgeon: Frank Ellington MD;  Location: [facility];  Service: General;  Laterality: N/A;     Social History     Socioeconomic History   • Marital status:    Tobacco Use   • Smoking status: Former     Packs/day: 1.00     Years: 30.00     Pack years: 30.00     Types: Cigarettes     Quit date: 2023     Years since quittin.2   • Smokeless tobacco: Never   Vaping Use   • Vaping Use: Never used   Substance and Sexual Activity   • Alcohol use: Not Currently   • Drug use: Never   • Sexual activity: Defer     Family History   Problem Relation Age of Onset   • Cancer Mother         Colon cancer   • Heart attack Father    • Colon cancer Maternal Aunt    • Malig Hyperthermia Neg Hx        Objective   Physical Exam    Well appearing patient in no acute distress on RA  Anicteric sclerae, no rash on exposed skin  Respirations non-labored  Awake, alert, and oriented x 4. Speech intact. No gross neurologic deficit  Appropriate mood and affect  No visible edema      ECOG 0.    Vitals:    23 0851   BP: 150/64   Pulse: 75   Resp: 18   Temp: 97.6 °F (36.4 °C)   TempSrc: Temporal   SpO2: 100%   Weight: 52.6 kg (115 lb 15.4 oz)               PHQ-9 Total Score:         The patient is not   experiencing fatigue.   PT/OT Functional Screening: PT fx screen: No needs identified  Speech Functional Screening: Speech fx screen: No needs identified  Rehab to be ordered: Rehab to be ordered: No needs identified      Result Review :   The following data was reviewed by: Sarmad Cohen MD on 04/24/23:  Lab Results   Component Value Date    HGB 9.5 (L) 04/24/2023    HCT 28.9 (L) 04/24/2023    MCV 90.0 04/24/2023     04/24/2023    WBC 5.87 04/24/2023    NEUTROABS 4.62 04/24/2023    LYMPHSABS 0.77 04/24/2023    MONOSABS 0.40 04/24/2023    EOSABS 0.04 04/24/2023    BASOSABS 0.03 04/24/2023     Lab Results   Component Value Date    GLUCOSE 111 (H) 04/17/2023    BUN 18 04/17/2023    CREATININE 0.74 04/17/2023     (L) 04/17/2023    K 4.1 04/17/2023    CL 99 04/17/2023    CO2 23.5 04/17/2023    CALCIUM 9.4 04/17/2023    PROTEINTOT 7.0 04/17/2023    ALBUMIN 4.0 04/17/2023    BILITOT 0.2 04/17/2023    ALKPHOS 102 04/17/2023    AST 21 04/17/2023    ALT 30 04/17/2023     Lab Results   Component Value Date    MG 2.0 04/10/2023     Labs personally reviewed. Mild anemia present, worsening.       No radiology results for the last 30 days.        Assessment and Plan    Diagnoses and all orders for this visit:    1. Primary tongue squamous cell carcinoma (Primary)  -     Cancel: sodium chloride 0.9 % infusion 250 mL  -     Cancel: palonosetron (ALOXI) injection 0.25 mg  -     Cancel: dexamethasone (DECADRON) 12 mg in sodium chloride 0.9 % IVPB  -     Cancel: CARBOplatin (PARAPLATIN) 110 mg in sodium chloride 0.9 % 261 mL chemo IVPB  -     Cancel: fluorouracil (ADRUCIL) 3,650 mg in sodium chloride 0.9 % 73 mL chemo infusion - FOR HOME USE  -     ONCBCN INFUSION APPOINTMENT REQUEST 01; Future  -     ONCBCN INFUSION APPOINTMENT REQUEST 01; Future  -     ONCBCN INFUSION APPOINTMENT REQUEST 01; Future  -     Infusion Appointment Request 10; Future    2. Anemia associated with chemotherapy    Other orders  -      Hydrocortisone Sod Suc (PF) (Solu-CORTEF) injection 100 mg  -     diphenhydrAMINE (BENADRYL) injection 50 mg  -     famotidine (PEPCID) injection 20 mg  -     sodium chloride 0.9 % infusion 250 mL  -     dexamethasone (DECADRON) 12 mg in sodium chloride 0.9 % IVPB  -     CARBOplatin (PARAPLATIN) 110 mg in sodium chloride 0.9 % 261 mL chemo IVPB  -     Hydrocortisone Sod Suc (PF) (Solu-CORTEF) injection 100 mg  -     diphenhydrAMINE (BENADRYL) injection 50 mg  -     famotidine (PEPCID) injection 20 mg  -     sodium chloride 0.9 % infusion 250 mL  -     dexamethasone (DECADRON) 12 mg in sodium chloride 0.9 % IVPB  -     CARBOplatin (PARAPLATIN) 110 mg in sodium chloride 0.9 % 261 mL chemo IVPB  -     Hydrocortisone Sod Suc (PF) (Solu-CORTEF) injection 100 mg  -     diphenhydrAMINE (BENADRYL) injection 50 mg  -     famotidine (PEPCID) injection 20 mg  -     sodium chloride 0.9 % infusion 250 mL  -     dexamethasone (DECADRON) 12 mg in sodium chloride 0.9 % IVPB  -     CARBOplatin (PARAPLATIN) 110 mg in sodium chloride 0.9 % 261 mL chemo IVPB  -     Hydrocortisone Sod Suc (PF) (Solu-CORTEF) injection 100 mg  -     diphenhydrAMINE (BENADRYL) injection 50 mg  -     famotidine (PEPCID) injection 20 mg    Ms. Serna has biopsy proven HPV negative squamous cell carcinoma of right base of tongue.  Per CT imaging with contrast that has been obtained she has possible right-sided level 2 lymph node measuring 0.7 cm.  3/6/23 PET scan confirmed FDG avidity of lymph nodes involved. She has met with Dr. Weiner with radiation oncology. Patient has had plate placed in jaw so that she is able to start treatment.  Plan for concurrent chemoradiation with curative intent. Port and PEG placed. Treatment started on 4/10/23.     C1 Cisplatin 4/10/23. First dose of radiation this day as well.     C2 chemo Carbo/Taxol (Switched due to Cisplatin shortage) 4/17/23. Complicated by reaction to Taxol. Carboplatin received. Will avoid further Taxol.      C3 chemo with Carbo/96h infusional 5-FU. Consent obtained. Labs appropriate to proceed. Anti-emetics provided for PRN use.     C4 chemotherapy due in 3 weeks. Can consider switch back to Cisplatin at that time if shortage has resolved.       Anemia secondary to chemotherapy  No intervention necessary. No dose adjustment needed. Continue to monitor.     This is an acute or chronic illness that poses a threat to life or bodily function. The above treatment plan involves a high risk of complications and/or mortality of patient management. Continue with labs to monitor for severe toxicities.     I spent 20 minutes caring for Carina on this date of service. This time includes time spent by me in the following activities:preparing for the visit, reviewing tests, obtaining and/or reviewing a separately obtained history, performing a medically appropriate examination and/or evaluation , counseling and educating the patient/family/caregiver, ordering medications, tests, or procedures, referring and communicating with other health care professionals , documenting information in the medical record, independently interpreting results and communicating that information with the patient/family/caregiver and care coordination    Patient Follow Up: Prior to C4 chemo  Patient was given instructions and counseling regarding her condition or for health maintenance advice. Please see specific information pulled into the AVS if appropriate.

## 2023-04-24 NOTE — PROGRESS NOTES
Outpatient Nutrition Oncology Follow Up    Patient Name: Carina Serna  YOB: 1960  MRN: [id number]    Recommendation(s):   Diet as tolerated.  Continue 1 can Osmolite 1.5 via syringe TID + 120 mL h20 flush TID with each feeding.  Imodium AD for diarrhea, if needed.      Reason for Consult:  EN F/U 4/24/23       Today's Weight:   52.6 kg    Weight Change:  1# wt gain from 1 week ago (4/17/23); 2.5% total wt decline in the past month    Nutrition-related concerns: Nausea, Diarrhea, Dysphagia/odynophagia, Taste Alterations and Xerostomia    Current PO intake:  Very soft or mashed foods     Current Nutrition Supplement intake:      Current EN RX:  Bolus feeds of 1 can Osmolite 1.5 TID + 120 mL h20 flush TID     Consult or Intervention:  Pt receiving Carboplatin & Aducil today.  Reports no further diarrhea, but experienced N/D after last chemo.  No further concerns and doing better this week so far.  Supplementing nutrition with peg feeds as above.  Discussed importance of having Imodium AD on hand in case diarrhea occurs again this week- pt v/u.    Nutrition Diagnosis: Moderate malnutrition related to decreased ability to consume sufficient energy as evidenced by physiological causes increasing nutrient needs., hypermetabolic state., muscle wasting. and fat loss.    Plan: Will follow up per oncology nutrition protocol

## 2023-04-24 NOTE — PROGRESS NOTES
"Presents for cycle #1, day #1 Carboplatin 70 mg/m2 daily x 4 plus 5FU 2400 mg/m2 infused over 96 hours every 3 weeks x 3 cycles.  Also receiving XRT.    Ht: 159.4 cm  Wt: 52.6 kg  BSA: 1.53 m2    Doses verified using today's parameters and are within acceptable limits of variation and rounding.    Labs reviewed and are within Epic comm parameter limits to proceed.  Of note, Scr=0.69 and eCrCl=69 ml/min.    UpToDate patient information printed and provided to patient and key information verbally highlighted including: Overview of regimen including infusion times; Recognition and management of allergic/infusion reactions; N/V management; \"call your doctor right away\" parameters.    All questions were answered in kind and no outstanding issues are noted at this time.  RTC tomorrow for C1D2 carboplatin.    "

## 2023-04-25 ENCOUNTER — HOSPITAL ENCOUNTER (OUTPATIENT)
Dept: RADIATION ONCOLOGY | Facility: HOSPITAL | Age: 63
Discharge: HOME | End: 2023-04-25

## 2023-04-25 ENCOUNTER — HOSPITAL ENCOUNTER (OUTPATIENT)
Dept: ONCOLOGY | Facility: HOSPITAL | Age: 63
Discharge: HOME | End: 2023-04-25
Admitting: INTERNAL MEDICINE
Payer: COMMERCIAL

## 2023-04-25 VITALS
DIASTOLIC BLOOD PRESSURE: 66 MMHG | TEMPERATURE: 98 F | RESPIRATION RATE: 20 BRPM | HEART RATE: 80 BPM | SYSTOLIC BLOOD PRESSURE: 132 MMHG

## 2023-04-25 DIAGNOSIS — C02.9 PRIMARY TONGUE SQUAMOUS CELL CARCINOMA: Primary | ICD-10-CM

## 2023-04-25 LAB
RAD ONC ARIA COURSE ID: NORMAL
RAD ONC ARIA COURSE INTENT: NORMAL
RAD ONC ARIA COURSE LAST TREATMENT DATE: NORMAL
RAD ONC ARIA COURSE START DATE: NORMAL
RAD ONC ARIA COURSE TREATMENT ELAPSED DAYS: 15
RAD ONC ARIA FIRST TREATMENT DATE: NORMAL
RAD ONC ARIA PLAN FRACTIONS TREATED TO DATE: 12
RAD ONC ARIA PLAN ID: NORMAL
RAD ONC ARIA PLAN PRESCRIBED DOSE PER FRACTION: 2 GY
RAD ONC ARIA PLAN PRIMARY REFERENCE POINT: NORMAL
RAD ONC ARIA PLAN TOTAL FRACTIONS PRESCRIBED: 35
RAD ONC ARIA PLAN TOTAL PRESCRIBED DOSE: 7000 CGY
RAD ONC ARIA REFERENCE POINT DOSAGE GIVEN TO DATE: 24 GY
RAD ONC ARIA REFERENCE POINT ID: NORMAL
RAD ONC ARIA REFERENCE POINT SESSION DOSAGE GIVEN: 2 GY

## 2023-04-25 PROCEDURE — 25010000002 CARBOPLATIN PER 50 MG: Performed by: INTERNAL MEDICINE

## 2023-04-25 PROCEDURE — 96375 TX/PRO/DX INJ NEW DRUG ADDON: CPT

## 2023-04-25 PROCEDURE — 77014 CHG CT GUIDANCE RADIATION THERAPY FLDS PLACEMENT: CPT | Performed by: RADIOLOGY

## 2023-04-25 PROCEDURE — 77386: CPT | Performed by: RADIOLOGY

## 2023-04-25 PROCEDURE — 96413 CHEMO IV INFUSION 1 HR: CPT

## 2023-04-25 RX ORDER — SODIUM CHLORIDE 9 MG/ML
250 INJECTION, SOLUTION INTRAVENOUS ONCE
Status: COMPLETED | OUTPATIENT
Start: 2023-04-25 | End: 2023-04-25

## 2023-04-25 RX ADMIN — SODIUM CHLORIDE 250 ML: 9 INJECTION, SOLUTION INTRAVENOUS at 10:34

## 2023-04-25 RX ADMIN — CARBOPLATIN 110 MG: 10 INJECTION, SOLUTION INTRAVENOUS at 10:58

## 2023-04-26 ENCOUNTER — HOSPITAL ENCOUNTER (OUTPATIENT)
Dept: ONCOLOGY | Facility: HOSPITAL | Age: 63
Discharge: HOME | End: 2023-04-26
Admitting: INTERNAL MEDICINE
Payer: COMMERCIAL

## 2023-04-26 ENCOUNTER — HOSPITAL ENCOUNTER (OUTPATIENT)
Dept: RADIATION ONCOLOGY | Facility: HOSPITAL | Age: 63
Discharge: HOME | End: 2023-04-26

## 2023-04-26 VITALS
OXYGEN SATURATION: 99 % | SYSTOLIC BLOOD PRESSURE: 149 MMHG | DIASTOLIC BLOOD PRESSURE: 56 MMHG | HEART RATE: 71 BPM | TEMPERATURE: 96.6 F | RESPIRATION RATE: 18 BRPM

## 2023-04-26 DIAGNOSIS — C02.9 PRIMARY TONGUE SQUAMOUS CELL CARCINOMA: Primary | ICD-10-CM

## 2023-04-26 LAB
RAD ONC ARIA COURSE ID: NORMAL
RAD ONC ARIA COURSE INTENT: NORMAL
RAD ONC ARIA COURSE LAST TREATMENT DATE: NORMAL
RAD ONC ARIA COURSE START DATE: NORMAL
RAD ONC ARIA COURSE TREATMENT ELAPSED DAYS: 16
RAD ONC ARIA FIRST TREATMENT DATE: NORMAL
RAD ONC ARIA PLAN FRACTIONS TREATED TO DATE: 13
RAD ONC ARIA PLAN ID: NORMAL
RAD ONC ARIA PLAN PRESCRIBED DOSE PER FRACTION: 2 GY
RAD ONC ARIA PLAN PRIMARY REFERENCE POINT: NORMAL
RAD ONC ARIA PLAN TOTAL FRACTIONS PRESCRIBED: 35
RAD ONC ARIA PLAN TOTAL PRESCRIBED DOSE: 7000 CGY
RAD ONC ARIA REFERENCE POINT DOSAGE GIVEN TO DATE: 26 GY
RAD ONC ARIA REFERENCE POINT ID: NORMAL
RAD ONC ARIA REFERENCE POINT SESSION DOSAGE GIVEN: 2 GY

## 2023-04-26 PROCEDURE — 25010000002 DEXAMETHASONE SODIUM PHOSPHATE 120 MG/30ML SOLUTION: Performed by: INTERNAL MEDICINE

## 2023-04-26 PROCEDURE — 25010000002 CARBOPLATIN PER 50 MG: Performed by: INTERNAL MEDICINE

## 2023-04-26 PROCEDURE — 77014 CHG CT GUIDANCE RADIATION THERAPY FLDS PLACEMENT: CPT | Performed by: RADIOLOGY

## 2023-04-26 PROCEDURE — 77386: CPT | Performed by: RADIOLOGY

## 2023-04-26 PROCEDURE — 96375 TX/PRO/DX INJ NEW DRUG ADDON: CPT

## 2023-04-26 PROCEDURE — 96413 CHEMO IV INFUSION 1 HR: CPT

## 2023-04-26 RX ORDER — SODIUM CHLORIDE 9 MG/ML
250 INJECTION, SOLUTION INTRAVENOUS ONCE
Status: COMPLETED | OUTPATIENT
Start: 2023-04-26 | End: 2023-04-26

## 2023-04-26 RX ADMIN — SODIUM CHLORIDE 250 ML: 9 INJECTION, SOLUTION INTRAVENOUS at 08:06

## 2023-04-26 RX ADMIN — DEXAMETHASONE SODIUM PHOSPHATE 12 MG: 4 INJECTION, SOLUTION INTRA-ARTICULAR; INTRALESIONAL; INTRAMUSCULAR; INTRAVENOUS; SOFT TISSUE at 08:07

## 2023-04-26 RX ADMIN — CARBOPLATIN 110 MG: 10 INJECTION, SOLUTION INTRAVENOUS at 08:25

## 2023-04-27 ENCOUNTER — HOSPITAL ENCOUNTER (OUTPATIENT)
Dept: RADIATION ONCOLOGY | Facility: HOSPITAL | Age: 63
Discharge: HOME | End: 2023-04-27

## 2023-04-27 ENCOUNTER — HOSPITAL ENCOUNTER (OUTPATIENT)
Dept: ONCOLOGY | Facility: HOSPITAL | Age: 63
Discharge: HOME | End: 2023-04-27
Payer: COMMERCIAL

## 2023-04-27 ENCOUNTER — APPOINTMENT (OUTPATIENT)
Dept: RADIATION ONCOLOGY | Facility: HOSPITAL | Age: 63
End: 2023-04-27
Payer: COMMERCIAL

## 2023-04-27 VITALS
HEIGHT: 63 IN | HEART RATE: 77 BPM | OXYGEN SATURATION: 100 % | TEMPERATURE: 96.5 F | WEIGHT: 116.84 LBS | BODY MASS INDEX: 20.7 KG/M2 | SYSTOLIC BLOOD PRESSURE: 143 MMHG | DIASTOLIC BLOOD PRESSURE: 63 MMHG | RESPIRATION RATE: 16 BRPM

## 2023-04-27 DIAGNOSIS — C02.9 PRIMARY TONGUE SQUAMOUS CELL CARCINOMA: Primary | ICD-10-CM

## 2023-04-27 LAB
RAD ONC ARIA COURSE ID: NORMAL
RAD ONC ARIA COURSE INTENT: NORMAL
RAD ONC ARIA COURSE LAST TREATMENT DATE: NORMAL
RAD ONC ARIA COURSE START DATE: NORMAL
RAD ONC ARIA COURSE TREATMENT ELAPSED DAYS: 17
RAD ONC ARIA FIRST TREATMENT DATE: NORMAL
RAD ONC ARIA PLAN FRACTIONS TREATED TO DATE: 14
RAD ONC ARIA PLAN ID: NORMAL
RAD ONC ARIA PLAN PRESCRIBED DOSE PER FRACTION: 2 GY
RAD ONC ARIA PLAN PRIMARY REFERENCE POINT: NORMAL
RAD ONC ARIA PLAN TOTAL FRACTIONS PRESCRIBED: 35
RAD ONC ARIA PLAN TOTAL PRESCRIBED DOSE: 7000 CGY
RAD ONC ARIA REFERENCE POINT DOSAGE GIVEN TO DATE: 28 GY
RAD ONC ARIA REFERENCE POINT ID: NORMAL
RAD ONC ARIA REFERENCE POINT SESSION DOSAGE GIVEN: 2 GY

## 2023-04-27 PROCEDURE — 77386: CPT | Performed by: RADIOLOGY

## 2023-04-27 PROCEDURE — 77014 CHG CT GUIDANCE RADIATION THERAPY FLDS PLACEMENT: CPT | Performed by: RADIOLOGY

## 2023-04-27 PROCEDURE — 25010000002 DEXAMETHASONE SODIUM PHOSPHATE 120 MG/30ML SOLUTION: Performed by: INTERNAL MEDICINE

## 2023-04-27 PROCEDURE — 25010000002 CARBOPLATIN PER 50 MG: Performed by: INTERNAL MEDICINE

## 2023-04-27 PROCEDURE — 96413 CHEMO IV INFUSION 1 HR: CPT

## 2023-04-27 PROCEDURE — 96375 TX/PRO/DX INJ NEW DRUG ADDON: CPT

## 2023-04-27 RX ORDER — SODIUM CHLORIDE 9 MG/ML
250 INJECTION, SOLUTION INTRAVENOUS ONCE
Status: COMPLETED | OUTPATIENT
Start: 2023-04-27 | End: 2023-04-27

## 2023-04-27 RX ADMIN — DEXAMETHASONE SODIUM PHOSPHATE 12 MG: 4 INJECTION, SOLUTION INTRA-ARTICULAR; INTRALESIONAL; INTRAMUSCULAR; INTRAVENOUS; SOFT TISSUE at 08:25

## 2023-04-27 RX ADMIN — CARBOPLATIN 110 MG: 10 INJECTION, SOLUTION INTRAVENOUS at 08:46

## 2023-04-27 RX ADMIN — SODIUM CHLORIDE 250 ML: 9 INJECTION, SOLUTION INTRAVENOUS at 08:25

## 2023-04-28 ENCOUNTER — DOCUMENTATION (OUTPATIENT)
Dept: NUTRITION | Facility: HOSPITAL | Age: 63
End: 2023-04-28
Payer: COMMERCIAL

## 2023-04-28 ENCOUNTER — HOSPITAL ENCOUNTER (OUTPATIENT)
Dept: RADIATION ONCOLOGY | Facility: HOSPITAL | Age: 63
Discharge: HOME | End: 2023-04-28

## 2023-04-28 ENCOUNTER — HOSPITAL ENCOUNTER (OUTPATIENT)
Dept: ONCOLOGY | Facility: HOSPITAL | Age: 63
Discharge: HOME | End: 2023-04-28
Payer: COMMERCIAL

## 2023-04-28 VITALS — WEIGHT: 116.4 LBS | BODY MASS INDEX: 20.78 KG/M2

## 2023-04-28 DIAGNOSIS — C02.9 PRIMARY TONGUE SQUAMOUS CELL CARCINOMA: ICD-10-CM

## 2023-04-28 DIAGNOSIS — C01 PRIMARY SQUAMOUS CELL CARCINOMA OF BASE OF TONGUE: ICD-10-CM

## 2023-04-28 DIAGNOSIS — Z45.2 ENCOUNTER FOR ADJUSTMENT OR MANAGEMENT OF VASCULAR ACCESS DEVICE: Primary | ICD-10-CM

## 2023-04-28 LAB
RAD ONC ARIA COURSE ID: NORMAL
RAD ONC ARIA COURSE INTENT: NORMAL
RAD ONC ARIA COURSE LAST TREATMENT DATE: NORMAL
RAD ONC ARIA COURSE START DATE: NORMAL
RAD ONC ARIA COURSE TREATMENT ELAPSED DAYS: 18
RAD ONC ARIA FIRST TREATMENT DATE: NORMAL
RAD ONC ARIA PLAN FRACTIONS TREATED TO DATE: 15
RAD ONC ARIA PLAN ID: NORMAL
RAD ONC ARIA PLAN PRESCRIBED DOSE PER FRACTION: 2 GY
RAD ONC ARIA PLAN PRIMARY REFERENCE POINT: NORMAL
RAD ONC ARIA PLAN TOTAL FRACTIONS PRESCRIBED: 35
RAD ONC ARIA PLAN TOTAL PRESCRIBED DOSE: 7000 CGY
RAD ONC ARIA REFERENCE POINT DOSAGE GIVEN TO DATE: 30 GY
RAD ONC ARIA REFERENCE POINT ID: NORMAL
RAD ONC ARIA REFERENCE POINT SESSION DOSAGE GIVEN: 2 GY

## 2023-04-28 PROCEDURE — 77386: CPT | Performed by: RADIOLOGY

## 2023-04-28 PROCEDURE — 25010000002 HEPARIN LOCK FLUSH PER 10 UNITS: Performed by: INTERNAL MEDICINE

## 2023-04-28 PROCEDURE — 77336 RADIATION PHYSICS CONSULT: CPT | Performed by: RADIOLOGY

## 2023-04-28 RX ORDER — HEPARIN SODIUM (PORCINE) LOCK FLUSH IV SOLN 100 UNIT/ML 100 UNIT/ML
500 SOLUTION INTRAVENOUS AS NEEDED
Status: DISCONTINUED | OUTPATIENT
Start: 2023-04-28 | End: 2023-04-29 | Stop reason: HOSPADM

## 2023-04-28 RX ORDER — ONDANSETRON HYDROCHLORIDE 8 MG/1
8 TABLET, FILM COATED ORAL EVERY 8 HOURS PRN
Qty: 30 TABLET | Refills: 2 | Status: SHIPPED | OUTPATIENT
Start: 2023-04-28

## 2023-04-28 RX ORDER — SODIUM CHLORIDE 0.9 % (FLUSH) 0.9 %
20 SYRINGE (ML) INJECTION AS NEEDED
Status: DISCONTINUED | OUTPATIENT
Start: 2023-04-28 | End: 2023-04-29 | Stop reason: HOSPADM

## 2023-04-28 RX ORDER — SODIUM CHLORIDE 0.9 % (FLUSH) 0.9 %
20 SYRINGE (ML) INJECTION AS NEEDED
OUTPATIENT
Start: 2023-04-28

## 2023-04-28 RX ORDER — HEPARIN SODIUM (PORCINE) LOCK FLUSH IV SOLN 100 UNIT/ML 100 UNIT/ML
500 SOLUTION INTRAVENOUS AS NEEDED
OUTPATIENT
Start: 2023-04-28

## 2023-04-28 RX ADMIN — Medication 20 ML: at 10:14

## 2023-04-28 RX ADMIN — HEPARIN SODIUM (PORCINE) LOCK FLUSH IV SOLN 100 UNIT/ML 500 UNITS: 100 SOLUTION at 10:15

## 2023-04-28 NOTE — PROGRESS NOTES
"Outpatient Nutrition Oncology Follow Up    Patient Name: Carina Senra  YOB: 1960  MRN: [id number]    Recommendation(s):   Diet as tolerated  Increase EN of Osmolite 1.5 to 4 cans/day with 120ml h20 flush with each bolus feed; may increase to 5 cans/day if weight loss occurs  Take antiemetics as prescribed (may consider additional antiemetics as medically feasible / appropriate )    Reason for Consult: XRT F/U  Dx: Primary tongue squamous cell carcinoma  Cancer Tx: Carboplatin, 5-FU, XRT to H&N region    Today's Weight: 52.8 kg  Weight Change: Pt with positive weight gain of 0.5-1# since last RD assessment, overall weight loss of 1.7% X 1 month (since 3/28/23)    Nutrition-related concerns: Nausea, Diarrhea, Dysphagia/odynophagia, Taste Alterations and Xerostomia    Current PO intake: Very little intake since last assessment d/t chemo (when she does consume foods they are very soft, but only occasional bites). She reports increase in nausea since receiving chemo this week and denies vomiting but does \"gag\" with PO intake.       Current EN RX: Osmolite 1.5 bolus 1 can TID + 120ml h20 flush TID    Consult or Intervention:   Spoke with pt at radiation oncology. She reports an increase in nausea since receiving chemo this week. Over the past 5 days she has slowly decreased PO intake to now only consuming occasional bites PO. Discussed with pt increasing EN of Osmolite 1.5 to 4-5 cans/day during times of very minimal PO intake to help maintain weight / meet increased nutritional needs. Pt v/u. Pt does have zofran ordered, may consider additional antiemetic if medically appropriate / feasible to aid nausea.   Pt does report purchasing Imodium AD and she has started taking this after receiving chemo which has helped to resolve diarrhea. Pt continues to experience taste alterations but is utilizing an alcohol free mouthrinse she received from her dentist. Encouraged pt to weigh herself at home every 3 " days and to increase EN if she starts to lose weight. Pt v/u. Encouraged pt to reach out with any nutrition related questions or concerns.     Nutrition Focused Physical Findings:      Estimated Nutrition Needs:      Nutrition Diagnosis: Moderate malnutrition related to decreased ability to consume sufficient energy as evidenced by physiological causes increasing nutrient needs., hypermetabolic state., muscle wasting., fat loss. and inadequate energy intake.    Plan: Will follow up per oncology nutrition protocol    Electronically signed by:  Dennise Stone RD  04/28/23 09:54 EDT

## 2023-05-01 ENCOUNTER — HOSPITAL ENCOUNTER (OUTPATIENT)
Dept: RADIATION ONCOLOGY | Facility: HOSPITAL | Age: 63
Discharge: HOME | End: 2023-05-01

## 2023-05-01 ENCOUNTER — HOSPITAL ENCOUNTER (OUTPATIENT)
Dept: RADIATION ONCOLOGY | Facility: HOSPITAL | Age: 63
Setting detail: RADIATION/ONCOLOGY SERIES
End: 2023-05-01
Payer: COMMERCIAL

## 2023-05-01 ENCOUNTER — HOSPITAL ENCOUNTER (OUTPATIENT)
Dept: ONCOLOGY | Facility: HOSPITAL | Age: 63
End: 2023-05-01

## 2023-05-01 LAB
RAD ONC ARIA COURSE ID: NORMAL
RAD ONC ARIA COURSE INTENT: NORMAL
RAD ONC ARIA COURSE LAST TREATMENT DATE: NORMAL
RAD ONC ARIA COURSE START DATE: NORMAL
RAD ONC ARIA COURSE TREATMENT ELAPSED DAYS: 21
RAD ONC ARIA FIRST TREATMENT DATE: NORMAL
RAD ONC ARIA PLAN FRACTIONS TREATED TO DATE: 16
RAD ONC ARIA PLAN ID: NORMAL
RAD ONC ARIA PLAN PRESCRIBED DOSE PER FRACTION: 2 GY
RAD ONC ARIA PLAN PRIMARY REFERENCE POINT: NORMAL
RAD ONC ARIA PLAN TOTAL FRACTIONS PRESCRIBED: 35
RAD ONC ARIA PLAN TOTAL PRESCRIBED DOSE: 7000 CGY
RAD ONC ARIA REFERENCE POINT DOSAGE GIVEN TO DATE: 32 GY
RAD ONC ARIA REFERENCE POINT ID: NORMAL
RAD ONC ARIA REFERENCE POINT SESSION DOSAGE GIVEN: 2 GY

## 2023-05-01 PROCEDURE — 77014 CHG CT GUIDANCE RADIATION THERAPY FLDS PLACEMENT: CPT | Performed by: RADIOLOGY

## 2023-05-01 PROCEDURE — 77386: CPT | Performed by: RADIOLOGY

## 2023-05-02 ENCOUNTER — HOSPITAL ENCOUNTER (OUTPATIENT)
Dept: RADIATION ONCOLOGY | Facility: HOSPITAL | Age: 63
Discharge: HOME | End: 2023-05-02

## 2023-05-02 VITALS
RESPIRATION RATE: 16 BRPM | BODY MASS INDEX: 20.5 KG/M2 | HEART RATE: 101 BPM | DIASTOLIC BLOOD PRESSURE: 88 MMHG | TEMPERATURE: 97.6 F | SYSTOLIC BLOOD PRESSURE: 138 MMHG | WEIGHT: 114.86 LBS | OXYGEN SATURATION: 94 %

## 2023-05-02 DIAGNOSIS — C01 PRIMARY SQUAMOUS CELL CARCINOMA OF BASE OF TONGUE: Primary | ICD-10-CM

## 2023-05-02 LAB
RAD ONC ARIA COURSE ID: NORMAL
RAD ONC ARIA COURSE INTENT: NORMAL
RAD ONC ARIA COURSE LAST TREATMENT DATE: NORMAL
RAD ONC ARIA COURSE START DATE: NORMAL
RAD ONC ARIA COURSE TREATMENT ELAPSED DAYS: 22
RAD ONC ARIA FIRST TREATMENT DATE: NORMAL
RAD ONC ARIA PLAN FRACTIONS TREATED TO DATE: 17
RAD ONC ARIA PLAN ID: NORMAL
RAD ONC ARIA PLAN PRESCRIBED DOSE PER FRACTION: 2 GY
RAD ONC ARIA PLAN PRIMARY REFERENCE POINT: NORMAL
RAD ONC ARIA PLAN TOTAL FRACTIONS PRESCRIBED: 35
RAD ONC ARIA PLAN TOTAL PRESCRIBED DOSE: 7000 CGY
RAD ONC ARIA REFERENCE POINT DOSAGE GIVEN TO DATE: 34 GY
RAD ONC ARIA REFERENCE POINT ID: NORMAL
RAD ONC ARIA REFERENCE POINT SESSION DOSAGE GIVEN: 2 GY

## 2023-05-02 PROCEDURE — 77427 RADIATION TX MANAGEMENT X5: CPT | Performed by: RADIOLOGY

## 2023-05-02 PROCEDURE — 77386: CPT | Performed by: RADIOLOGY

## 2023-05-02 PROCEDURE — 77014 CHG CT GUIDANCE RADIATION THERAPY FLDS PLACEMENT: CPT | Performed by: RADIOLOGY

## 2023-05-02 RX ORDER — LIDOCAINE HYDROCHLORIDE 20 MG/ML
2 SOLUTION OROPHARYNGEAL AS NEEDED
Qty: 100 ML | Refills: 0 | Status: SHIPPED | OUTPATIENT
Start: 2023-05-02

## 2023-05-02 RX ORDER — ALUMINA, MAGNESIA, AND SIMETHICONE 2400; 2400; 240 MG/30ML; MG/30ML; MG/30ML
10 SUSPENSION ORAL EVERY 6 HOURS PRN
Qty: 355 ML | Refills: 0 | Status: SHIPPED | OUTPATIENT
Start: 2023-05-02

## 2023-05-02 NOTE — PROGRESS NOTES
On Treatment Visit       Patient: Carina Serna   YOB: 1960   Medical Record Number: [id number]     Date of Visit  May 2, 2023   Primary Diagnosis:Primary squamous cell carcinoma of base of tongue [C01]  Cancer Staging: Cancer Staging   No matching staging information was found for the patient.       was seen today for an on treatment visit.  She is receiving radiation therapy to the head and neck. She  has received 3400 cGy in 17 fractions out of a planned dose of 7000 cGy in 35 fractions. She is currently receiving concurrent cisplatin chemotherapy per Dr. Cohen.     Progression of odynophagia not entirely helped with Delmi's Magic potion.  Caring for skin with using lotion once daily                                       Review of Systems:   Review of Systems   Constitutional: Positive for appetite change (decreased), fatigue (3/10) and unexpected weight change (2 lb wt loss since 4/28).        Enteral nutrition 2-3 cans per day     HENT: Positive for sore throat (often), tinnitus (occasional) and trouble swallowing (often). Negative for ear pain (right ear hurts throughout the day comes and goes ).         Xerostomia dysgeusia      Eyes: Negative for visual disturbance.   Respiratory: Negative for cough and shortness of breath.    Gastrointestinal: Positive for diarrhea (taking Imodium as needed) and nausea (occaisonal). Negative for constipation and vomiting.   Genitourinary: Negative for difficulty urinating, dysuria, frequency and urgency.   Musculoskeletal: Positive for neck stiffness. Negative for back pain and joint swelling.   Skin: Positive for color change (mild erythema). Negative for rash.   Neurological: Negative for dizziness, light-headedness and headaches.   Psychiatric/Behavioral: Positive for sleep disturbance (toss and turns ). Negative for agitation.       Vitals:     Vitals:    05/02/23 0959   BP: 138/88   Pulse: 101   Resp: 16   Temp: 97.6 °F (36.4 °C)   SpO2:  94%       Weight:   Wt Readings from Last 3 Encounters:   05/02/23 52.1 kg (114 lb 13.8 oz)   04/28/23 52.8 kg (116 lb 6.5 oz)   04/27/23 53 kg (116 lb 13.5 oz)      Pain:    Pain Score    05/02/23 0959   PainSc:   5   PainLoc: Throat         Physical Exam:  Gen: WD/WN; NAD  HEENT: MMM  Trachea: midline  Chest: symmetric  Resp: normal respiratory effort  Extr: warm, well-perfused  Neuro: awake and alert; no aphasia or neglect    Plan: I have reviewed treatment setup notes, checked and approved the daily guidance images.  I reviewed dose delivery, treatment parameters and deemed them appropriate. We plan to continue radiation therapy as prescribed.      We will order lidocaine plus Mylanta for odynophagia.  May ultimately require opioid analgesics.  Advised increasing moisturizer application to twice daily    Radiation Oncology    Electronically signed by Nikolai Weiner MD 5/2/2023  12:24 EDT

## 2023-05-03 ENCOUNTER — HOSPITAL ENCOUNTER (OUTPATIENT)
Dept: RADIATION ONCOLOGY | Facility: HOSPITAL | Age: 63
Discharge: HOME | End: 2023-05-03

## 2023-05-03 LAB
RAD ONC ARIA COURSE ID: NORMAL
RAD ONC ARIA COURSE INTENT: NORMAL
RAD ONC ARIA COURSE LAST TREATMENT DATE: NORMAL
RAD ONC ARIA COURSE START DATE: NORMAL
RAD ONC ARIA COURSE TREATMENT ELAPSED DAYS: 23
RAD ONC ARIA FIRST TREATMENT DATE: NORMAL
RAD ONC ARIA PLAN FRACTIONS TREATED TO DATE: 18
RAD ONC ARIA PLAN ID: NORMAL
RAD ONC ARIA PLAN PRESCRIBED DOSE PER FRACTION: 2 GY
RAD ONC ARIA PLAN PRIMARY REFERENCE POINT: NORMAL
RAD ONC ARIA PLAN TOTAL FRACTIONS PRESCRIBED: 35
RAD ONC ARIA PLAN TOTAL PRESCRIBED DOSE: 7000 CGY
RAD ONC ARIA REFERENCE POINT DOSAGE GIVEN TO DATE: 36 GY
RAD ONC ARIA REFERENCE POINT ID: NORMAL
RAD ONC ARIA REFERENCE POINT SESSION DOSAGE GIVEN: 2 GY

## 2023-05-03 PROCEDURE — 77014 CHG CT GUIDANCE RADIATION THERAPY FLDS PLACEMENT: CPT | Performed by: RADIOLOGY

## 2023-05-03 PROCEDURE — 77386: CPT | Performed by: RADIOLOGY

## 2023-05-04 ENCOUNTER — HOSPITAL ENCOUNTER (OUTPATIENT)
Dept: RADIATION ONCOLOGY | Facility: HOSPITAL | Age: 63
Discharge: HOME | End: 2023-05-04

## 2023-05-04 ENCOUNTER — HOSPITAL ENCOUNTER (OUTPATIENT)
Dept: RADIATION ONCOLOGY | Facility: HOSPITAL | Age: 63
Setting detail: RADIATION/ONCOLOGY SERIES
Discharge: HOME | End: 2023-05-04
Payer: COMMERCIAL

## 2023-05-04 VITALS — BODY MASS INDEX: 20.15 KG/M2 | WEIGHT: 112.88 LBS

## 2023-05-04 DIAGNOSIS — C01 PRIMARY SQUAMOUS CELL CARCINOMA OF BASE OF TONGUE: Primary | ICD-10-CM

## 2023-05-04 DIAGNOSIS — R13.12 OROPHARYNGEAL DYSPHAGIA: Primary | ICD-10-CM

## 2023-05-04 LAB
RAD ONC ARIA COURSE ID: NORMAL
RAD ONC ARIA COURSE INTENT: NORMAL
RAD ONC ARIA COURSE LAST TREATMENT DATE: NORMAL
RAD ONC ARIA COURSE START DATE: NORMAL
RAD ONC ARIA COURSE TREATMENT ELAPSED DAYS: 24
RAD ONC ARIA FIRST TREATMENT DATE: NORMAL
RAD ONC ARIA PLAN FRACTIONS TREATED TO DATE: 19
RAD ONC ARIA PLAN ID: NORMAL
RAD ONC ARIA PLAN PRESCRIBED DOSE PER FRACTION: 2 GY
RAD ONC ARIA PLAN PRIMARY REFERENCE POINT: NORMAL
RAD ONC ARIA PLAN TOTAL FRACTIONS PRESCRIBED: 35
RAD ONC ARIA PLAN TOTAL PRESCRIBED DOSE: 7000 CGY
RAD ONC ARIA REFERENCE POINT DOSAGE GIVEN TO DATE: 38 GY
RAD ONC ARIA REFERENCE POINT ID: NORMAL
RAD ONC ARIA REFERENCE POINT SESSION DOSAGE GIVEN: 2 GY

## 2023-05-04 PROCEDURE — 77386: CPT | Performed by: RADIOLOGY

## 2023-05-04 PROCEDURE — 92610 EVALUATE SWALLOWING FUNCTION: CPT | Performed by: SPEECH-LANGUAGE PATHOLOGIST

## 2023-05-04 PROCEDURE — 77014 CHG CT GUIDANCE RADIATION THERAPY FLDS PLACEMENT: CPT | Performed by: RADIOLOGY

## 2023-05-04 NOTE — PROGRESS NOTES
Outpatient Nutrition Oncology Follow Up    Patient Name: Carina Serna  YOB: 1960  MRN: [id number]    Recommendation(s):   Advance EN bolus feeds of Osmolite 1.5 to 1 can 4 X day (at least 3 hours between feeds).   Reweigh in 2-3 days- if wt has declined, suggest to increase feeds to 4.5 cans/day, then advance to 5 total cans/day after 3 days.    Suggested to continue with 120 mL h20 flush given with each bolus feeding.  Pleasure feeds as tolerated.      Reason for Consult:  EN F/U       Today's Weight:  51.2 kg    Weight Change:  3.5% loss in 1 week (total 6.7% loss in 1 month)    Nutrition-related concerns: Nausea/Vomitting, Early Satiety, Dysphagia/odynophagia, Taste Alterations and Xerostomia    Current PO intake:  Minimal at this time, but trying to eat popsicles, jello, some fluids    Current EN RX:   Osmolite 1.5 (bolus feeds), giving 3 cans on average/day.  Pt attempted to administer 2 cans BID, since reports good tolerance previously to 2 cans given once per day, however did not tolerate this.  Pt experienced emesis of the formula when attempting to give 2 cans at at time later in the day.     Consult or Intervention:  PO intake has greatly declined.  Unable to tolerate, as well as hesitant to attempt to give, 2 cans of EN formula at 1 time now.  Pt attempted to go up to 4 total cans/day, as above, but experienced vomiting the second attempt.  She plans to give only 1 can at a time now.  Pt understands to advance slowly to 1 can 4 X day + 120 mL h20 flush given w/ each feeding 4 X day.  Suggested pt weigh herself at home this weekend and if wt has declined further, recommended to add an extra 1/2 can of formula into the schedule.  5 cans/day meets needs better, and explained this, but suggested to increase this gradually to promote better tolerance.  Offered a gravity bag for pt to trial, however she declined and prefers to continue the bolus / syringe method.  Pt reports good tolerance  to the formula itself.    Nutrition Diagnosis: Moderate malnutrition related to increased nutrient needs due to catabolic disease as evidenced by physiological causes increasing nutrient needs., hypermetabolic state., muscle wasting., fat loss., patient report. and multiple nutrition-related concerns including emesis w/ excess volume of EN, 6.7% wt decline in 1 month, <75% EEE X 1 month.    Plan: Will follow up per oncology nutrition protocol

## 2023-05-04 NOTE — THERAPY EVALUATION
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 Carina Serna is a 63 y.o. 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  She is  here today for dysphagia evaluation for ongoing dysphagia during radiation therapy. She  has received 3800 cGy in 19 fractions out of a planned dose of 7000 cGy in 35 fractions. She is currently receiving concurrent cisplatin chemotherapy.  Patient is positive for significant weight loss, odynophagia, dysgeusia and xerostomia.      Patient indicates she is eating popsicles,  ice cream, water, tea, lemonade and jello by mouth and uses supplements through PEG tube for main nutrition. She indicates one incidence of choking on a pill.   Patient demonstrating lymphodema near the surgical incision.          Precautions: no  Patient's Goals/Expectations: Evaluate swallow     Current Diet Level: thin liquids, trials of puree with main nutrition received via PEG tube.   Comments: Patient lives in [facility] with her , daughter and granddaughter.  She  denies use of alcohol and tobacco.        Psychosocial History     Usual Living Arrangement: Lives in [facility] with family  Psychosocial History (depression/anxiety) No  Nutritional Problems: yes     Past Medical History     Pertinent Past Medical History:   Medical History        Past Medical History:   Diagnosis Date   • Dental disease     • PONV (postoperative nausea and vomiting)     • Primary squamous cell carcinoma of base of tongue (HCC)       NEWLY DIAGNOSED AND NO TYPE OF TREATMENT DONE THUS FAR 2/27/23   • Sore throat                    History of Seizures: no        Abuse     Patient being Hurt, Hit, Scared or Neglected?  no  Caregiver Neglect: no        Pain  Pain Intensity: 3  Pain Location: throat  Pain Scale Used: Numerical  Pain Management Techniques:Prescribed oxycodone  Orientation     Level of Consciousness: alert and oriented times 3                       CLINICAL SWALLOW EVALUATION     Objective     Current Diet Level: soft mashed solids and thin liquids  Oral Motor Exam: Patient is edentulous, lower right labial droop has resolved    Soft Palate: WFL  Laryngeal Function and Elevation: WFL  Vocal Quality: WFL  Swallow Reflex: Good initiation of swallow reflex  Positioning: Upright 90 degree hip flexion        P.O. Trials   Patient was clinically assessed for dysphagia with the following consistencies and results:   Patient was assessed using thin liquid of water, nectar thickened apple juice, purée of applesauce, soft solid of banana        Nectar thick liquid by spoon: WFL  Nectar thick by cup: WFL  Thin Liquid by spoon: WFL  Thin Liquid by cup: WFL  Thin liquid by straw: WFL  Puree solid: WFL  Soft Solid: Increased mastication time secondary to being edentulous, laryngeal elevation to palpation noted, good ability to propel bolus, good initiation of swallows and complaints of pharyngeal residue after the swallow.   Solid: No trials secondary to safety concerns  Additional Trials: N/A  Oral  Preparatory Phase: Impaired secondary to being edentulous  Oral Phase: decreased ability to propel solids  Pharyngeal Phase: decreased ability to propel solids  Laryngeal Elevation/Coordination: WFL     Comments: Patient demonstrated mild dysphagia secondary to decreased tongue base and pharyngeal strength and range of motion.   No overt clinical signs and symptoms of aspiration was noted during the swallow.     Dysphagia Criteria     Patient Demonstrates: Risk of aspiration  Swallow Function: Patient demonstrated no overt, clinical signs and symptoms of aspiration.  Patient demonstrates  oropharngeal dysphagia secondary to being edentulous, decrease pharyngeal strength and range of motion.      Education: Patient re-educated on HEP to aid in preserving the swallow at highest levels of functioning, diet, signs and symptoms of aspiration, and use of compensatory strategy of placing medication in yogurt or applesauce,  chin tuck and liquid wash should medication become lodged in throat.       Recommendations     Diet:   Liquidy puree, and thin liquids by mouth, PEG tube use to support nutrition as needed  Position:  Upright 90 degree hip flexion for all p.o. intake     Environment:  Quiet environment with ample time to feed.     Compensatory Techniques:  Alternate small sips of liquids and small bites of solids, effortful swallows,   Medical Intervention:  Follow-up evaluation of swallow  2 weeks after radiation therapy    Functional Communication Testing    Patient is rated on the Functional Communication Measures (FCM) for swallow at a level 4/7 with a 40-59% limitation.          PLAN:   Evaluation only    Thank you for the referral,   Katlin Prado MS, CCC-SLP     SIGNATURE: DESMOND Asher    Electronically signed 5/4/2023    [place] License:  [id number]

## 2023-05-05 ENCOUNTER — HOSPITAL ENCOUNTER (OUTPATIENT)
Dept: RADIATION ONCOLOGY | Facility: HOSPITAL | Age: 63
Discharge: HOME | End: 2023-05-05
Payer: COMMERCIAL

## 2023-05-05 LAB
RAD ONC ARIA COURSE ID: NORMAL
RAD ONC ARIA COURSE INTENT: NORMAL
RAD ONC ARIA COURSE LAST TREATMENT DATE: NORMAL
RAD ONC ARIA COURSE START DATE: NORMAL
RAD ONC ARIA COURSE TREATMENT ELAPSED DAYS: 25
RAD ONC ARIA FIRST TREATMENT DATE: NORMAL
RAD ONC ARIA PLAN FRACTIONS TREATED TO DATE: 20
RAD ONC ARIA PLAN ID: NORMAL
RAD ONC ARIA PLAN PRESCRIBED DOSE PER FRACTION: 2 GY
RAD ONC ARIA PLAN PRIMARY REFERENCE POINT: NORMAL
RAD ONC ARIA PLAN TOTAL FRACTIONS PRESCRIBED: 35
RAD ONC ARIA PLAN TOTAL PRESCRIBED DOSE: 7000 CGY
RAD ONC ARIA REFERENCE POINT DOSAGE GIVEN TO DATE: 40 GY
RAD ONC ARIA REFERENCE POINT ID: NORMAL
RAD ONC ARIA REFERENCE POINT SESSION DOSAGE GIVEN: 2 GY

## 2023-05-05 PROCEDURE — 77386: CPT | Performed by: RADIOLOGY

## 2023-05-05 PROCEDURE — 77336 RADIATION PHYSICS CONSULT: CPT | Performed by: RADIOLOGY

## 2023-05-05 PROCEDURE — 77014 CHG CT GUIDANCE RADIATION THERAPY FLDS PLACEMENT: CPT | Performed by: RADIOLOGY

## 2023-05-08 ENCOUNTER — HOSPITAL ENCOUNTER (OUTPATIENT)
Dept: RADIATION ONCOLOGY | Facility: HOSPITAL | Age: 63
Discharge: HOME | End: 2023-05-08

## 2023-05-08 LAB
RAD ONC ARIA COURSE ID: NORMAL
RAD ONC ARIA COURSE INTENT: NORMAL
RAD ONC ARIA COURSE LAST TREATMENT DATE: NORMAL
RAD ONC ARIA COURSE START DATE: NORMAL
RAD ONC ARIA COURSE TREATMENT ELAPSED DAYS: 28
RAD ONC ARIA FIRST TREATMENT DATE: NORMAL
RAD ONC ARIA PLAN FRACTIONS TREATED TO DATE: 21
RAD ONC ARIA PLAN ID: NORMAL
RAD ONC ARIA PLAN PRESCRIBED DOSE PER FRACTION: 2 GY
RAD ONC ARIA PLAN PRIMARY REFERENCE POINT: NORMAL
RAD ONC ARIA PLAN TOTAL FRACTIONS PRESCRIBED: 35
RAD ONC ARIA PLAN TOTAL PRESCRIBED DOSE: 7000 CGY
RAD ONC ARIA REFERENCE POINT DOSAGE GIVEN TO DATE: 42 GY
RAD ONC ARIA REFERENCE POINT ID: NORMAL
RAD ONC ARIA REFERENCE POINT SESSION DOSAGE GIVEN: 2 GY

## 2023-05-08 PROCEDURE — 77014 CHG CT GUIDANCE RADIATION THERAPY FLDS PLACEMENT: CPT | Performed by: RADIOLOGY

## 2023-05-08 PROCEDURE — 77386: CPT | Performed by: RADIOLOGY

## 2023-05-09 ENCOUNTER — HOSPITAL ENCOUNTER (OUTPATIENT)
Dept: RADIATION ONCOLOGY | Facility: HOSPITAL | Age: 63
Discharge: HOME | End: 2023-05-09

## 2023-05-09 VITALS
BODY MASS INDEX: 19.99 KG/M2 | DIASTOLIC BLOOD PRESSURE: 88 MMHG | TEMPERATURE: 97.6 F | RESPIRATION RATE: 16 BRPM | HEART RATE: 96 BPM | OXYGEN SATURATION: 94 % | WEIGHT: 111.99 LBS | SYSTOLIC BLOOD PRESSURE: 152 MMHG

## 2023-05-09 DIAGNOSIS — C01 PRIMARY SQUAMOUS CELL CARCINOMA OF BASE OF TONGUE: Primary | ICD-10-CM

## 2023-05-09 LAB
RAD ONC ARIA COURSE ID: NORMAL
RAD ONC ARIA COURSE INTENT: NORMAL
RAD ONC ARIA COURSE LAST TREATMENT DATE: NORMAL
RAD ONC ARIA COURSE START DATE: NORMAL
RAD ONC ARIA COURSE TREATMENT ELAPSED DAYS: 29
RAD ONC ARIA FIRST TREATMENT DATE: NORMAL
RAD ONC ARIA PLAN FRACTIONS TREATED TO DATE: 22
RAD ONC ARIA PLAN ID: NORMAL
RAD ONC ARIA PLAN PRESCRIBED DOSE PER FRACTION: 2 GY
RAD ONC ARIA PLAN PRIMARY REFERENCE POINT: NORMAL
RAD ONC ARIA PLAN TOTAL FRACTIONS PRESCRIBED: 35
RAD ONC ARIA PLAN TOTAL PRESCRIBED DOSE: 7000 CGY
RAD ONC ARIA REFERENCE POINT DOSAGE GIVEN TO DATE: 44 GY
RAD ONC ARIA REFERENCE POINT ID: NORMAL
RAD ONC ARIA REFERENCE POINT SESSION DOSAGE GIVEN: 2 GY

## 2023-05-09 PROCEDURE — 77014 CHG CT GUIDANCE RADIATION THERAPY FLDS PLACEMENT: CPT | Performed by: RADIOLOGY

## 2023-05-09 PROCEDURE — 77386: CPT | Performed by: RADIOLOGY

## 2023-05-09 PROCEDURE — 77427 RADIATION TX MANAGEMENT X5: CPT | Performed by: RADIOLOGY

## 2023-05-09 RX ORDER — FLUOXETINE 10 MG/1
1 TABLET, FILM COATED ORAL DAILY
COMMUNITY
Start: 2023-04-25

## 2023-05-09 RX ORDER — LOSARTAN POTASSIUM 25 MG/1
1 TABLET ORAL DAILY
COMMUNITY
Start: 2023-04-25

## 2023-05-09 NOTE — PROGRESS NOTES
On Treatment Visit       Patient: Carina Serna   YOB: 1960   Medical Record Number: [id number]     Date of Visit  May 9, 2023   Primary Diagnosis:Primary squamous cell carcinoma of base of tongue [C01]  Cancer Staging: Cancer Staging   No matching staging information was found for the patient.       was seen today for an on treatment visit.  She is receiving radiation therapy to the head and neck. She  has received 4400 cGy in 22 fractions out of a planned dose of 7000 cGy in 35 fractions. She is currently receiving concurrent carboplatin/5-FU chemotherapy per Dr. Cohen.     Has lost a few pounds; only consuming 3 cans/day.  Caring for skin as advised, using emollients 3 times per day.                                           Review of Systems:   Review of Systems   Constitutional: Positive for fatigue (4/10) and unexpected weight change (down 3lbs since 05.02.23). Negative for appetite change (only eats popsicles, jello ).        3 cans enteral nutrition   HENT: Positive for sore throat and trouble swallowing (occasionally). Negative for tinnitus.    Eyes: Negative for visual disturbance.   Respiratory: Negative for cough and shortness of breath.    Gastrointestinal: Positive for nausea (started saturday ended yesterday ) and vomiting (over the weekend vomit x 2). Negative for constipation and diarrhea.   Genitourinary: Negative for difficulty urinating, dysuria, frequency and urgency.   Musculoskeletal: Negative for back pain and joint swelling.   Skin: Positive for color change (moderate erythema with hyperpigmentation to treatment area). Negative for rash.   Neurological: Negative for dizziness, light-headedness and headaches.   Psychiatric/Behavioral: Positive for sleep disturbance (toss and turns throughout night). Negative for agitation.       Vitals:     Vitals:    05/09/23 0949   BP: 152/88   Pulse: 96   Resp: 16   Temp: 97.6 °F (36.4 °C)   SpO2: 94%       Weight:   Wt  Readings from Last 3 Encounters:   05/09/23 50.8 kg (111 lb 15.9 oz)   05/04/23 51.2 kg (112 lb 14 oz)   05/02/23 52.1 kg (114 lb 13.8 oz)      Pain:    Pain Score    05/09/23 0949   PainSc:   3   PainLoc: Throat         Physical Exam:  Gen: Thin; NAD  HEENT: MMM; rubor at neck bilaterally; posterior pharyngeal erythema/mucositis  Trachea: midline  Chest: symmetric  Resp: normal respiratory effort  Extr: warm, well-perfused  Neuro: awake and alert; no aphasia or neglect    Plan: I have reviewed treatment setup notes, checked and approved the daily guidance images.  I reviewed dose delivery, treatment parameters and deemed them appropriate. We plan to continue radiation therapy as prescribed.    We will administer 1 L normal saline tomorrow given likely impending volume depletion      Radiation Oncology    Electronically signed by Nikolai Weiner MD 5/9/2023  11:14 EDT

## 2023-05-10 ENCOUNTER — HOSPITAL ENCOUNTER (OUTPATIENT)
Dept: RADIATION ONCOLOGY | Facility: HOSPITAL | Age: 63
Discharge: HOME | End: 2023-05-10

## 2023-05-10 ENCOUNTER — HOSPITAL ENCOUNTER (OUTPATIENT)
Dept: INFUSION THERAPY | Facility: HOSPITAL | Age: 63
Discharge: HOME | End: 2023-05-10
Admitting: RADIOLOGY
Payer: COMMERCIAL

## 2023-05-10 VITALS
TEMPERATURE: 97.9 F | OXYGEN SATURATION: 100 % | HEART RATE: 85 BPM | DIASTOLIC BLOOD PRESSURE: 80 MMHG | SYSTOLIC BLOOD PRESSURE: 143 MMHG | RESPIRATION RATE: 20 BRPM

## 2023-05-10 VITALS — BODY MASS INDEX: 19.48 KG/M2 | WEIGHT: 109.13 LBS

## 2023-05-10 DIAGNOSIS — C01 PRIMARY SQUAMOUS CELL CARCINOMA OF BASE OF TONGUE: Primary | ICD-10-CM

## 2023-05-10 DIAGNOSIS — Z45.2 ENCOUNTER FOR ADJUSTMENT OR MANAGEMENT OF VASCULAR ACCESS DEVICE: ICD-10-CM

## 2023-05-10 LAB
ALBUMIN SERPL-MCNC: 4.1 G/DL (ref 3.5–5.2)
ALBUMIN/GLOB SERPL: 1.2 G/DL
ALP SERPL-CCNC: 100 U/L (ref 39–117)
ALT SERPL W P-5'-P-CCNC: 12 U/L (ref 1–33)
ANION GAP SERPL CALCULATED.3IONS-SCNC: 11.4 MMOL/L (ref 5–15)
ANISOCYTOSIS BLD QL: NORMAL
AST SERPL-CCNC: 15 U/L (ref 1–32)
BASOPHILS # BLD AUTO: 0.01 10*3/MM3 (ref 0–0.2)
BASOPHILS NFR BLD AUTO: 0.3 % (ref 0–1.5)
BILIRUB SERPL-MCNC: 0.2 MG/DL (ref 0–1.2)
BUN SERPL-MCNC: 18 MG/DL (ref 8–23)
BUN/CREAT SERPL: 27.7 (ref 7–25)
CALCIUM SPEC-SCNC: 9.9 MG/DL (ref 8.6–10.5)
CHLORIDE SERPL-SCNC: 97 MMOL/L (ref 98–107)
CO2 SERPL-SCNC: 25.6 MMOL/L (ref 22–29)
CREAT SERPL-MCNC: 0.65 MG/DL (ref 0.57–1)
DEPRECATED RDW RBC AUTO: 41.7 FL (ref 37–54)
EGFRCR SERPLBLD CKD-EPI 2021: 99.1 ML/MIN/1.73
EOSINOPHIL # BLD AUTO: 0 10*3/MM3 (ref 0–0.4)
EOSINOPHIL NFR BLD AUTO: 0 % (ref 0.3–6.2)
ERYTHROCYTE [DISTWIDTH] IN BLOOD BY AUTOMATED COUNT: 14.1 % (ref 12.3–15.4)
GLOBULIN UR ELPH-MCNC: 3.3 GM/DL
GLUCOSE SERPL-MCNC: 132 MG/DL (ref 65–99)
HCT VFR BLD AUTO: 23.1 % (ref 34–46.6)
HGB BLD-MCNC: 7.8 G/DL (ref 12–15.9)
IMM GRANULOCYTES # BLD AUTO: 0.01 10*3/MM3 (ref 0–0.05)
IMM GRANULOCYTES NFR BLD AUTO: 0.3 % (ref 0–0.5)
LYMPHOCYTES # BLD AUTO: 0.31 10*3/MM3 (ref 0.7–3.1)
LYMPHOCYTES NFR BLD AUTO: 10.3 % (ref 19.6–45.3)
MCH RBC QN AUTO: 29.8 PG (ref 26.6–33)
MCHC RBC AUTO-ENTMCNC: 33.8 G/DL (ref 31.5–35.7)
MCV RBC AUTO: 88.2 FL (ref 79–97)
MICROCYTES BLD QL: NORMAL
MONOCYTES # BLD AUTO: 0.21 10*3/MM3 (ref 0.1–0.9)
MONOCYTES NFR BLD AUTO: 7 % (ref 5–12)
NEUTROPHILS NFR BLD AUTO: 2.48 10*3/MM3 (ref 1.7–7)
NEUTROPHILS NFR BLD AUTO: 82.1 % (ref 42.7–76)
NRBC BLD AUTO-RTO: 0 /100 WBC (ref 0–0.2)
PLATELET # BLD AUTO: 43 10*3/MM3 (ref 140–450)
PMV BLD AUTO: 9.5 FL (ref 6–12)
POTASSIUM SERPL-SCNC: 4.1 MMOL/L (ref 3.5–5.2)
PROT SERPL-MCNC: 7.4 G/DL (ref 6–8.5)
RAD ONC ARIA COURSE ID: NORMAL
RAD ONC ARIA COURSE INTENT: NORMAL
RAD ONC ARIA COURSE LAST TREATMENT DATE: NORMAL
RAD ONC ARIA COURSE START DATE: NORMAL
RAD ONC ARIA COURSE TREATMENT ELAPSED DAYS: 30
RAD ONC ARIA FIRST TREATMENT DATE: NORMAL
RAD ONC ARIA PLAN FRACTIONS TREATED TO DATE: 23
RAD ONC ARIA PLAN ID: NORMAL
RAD ONC ARIA PLAN PRESCRIBED DOSE PER FRACTION: 2 GY
RAD ONC ARIA PLAN PRIMARY REFERENCE POINT: NORMAL
RAD ONC ARIA PLAN TOTAL FRACTIONS PRESCRIBED: 35
RAD ONC ARIA PLAN TOTAL PRESCRIBED DOSE: 7000 CGY
RAD ONC ARIA REFERENCE POINT DOSAGE GIVEN TO DATE: 46 GY
RAD ONC ARIA REFERENCE POINT ID: NORMAL
RAD ONC ARIA REFERENCE POINT SESSION DOSAGE GIVEN: 2 GY
RBC # BLD AUTO: 2.62 10*6/MM3 (ref 3.77–5.28)
SMALL PLATELETS BLD QL SMEAR: NORMAL
SODIUM SERPL-SCNC: 134 MMOL/L (ref 136–145)
WBC MORPH BLD: NORMAL
WBC NRBC COR # BLD: 3.02 10*3/MM3 (ref 3.4–10.8)

## 2023-05-10 PROCEDURE — 25010000002 HEPARIN LOCK FLUSH PER 10 UNITS: Performed by: INTERNAL MEDICINE

## 2023-05-10 PROCEDURE — 96360 HYDRATION IV INFUSION INIT: CPT

## 2023-05-10 PROCEDURE — 36591 DRAW BLOOD OFF VENOUS DEVICE: CPT

## 2023-05-10 PROCEDURE — 77014 CHG CT GUIDANCE RADIATION THERAPY FLDS PLACEMENT: CPT | Performed by: RADIOLOGY

## 2023-05-10 PROCEDURE — 96374 THER/PROPH/DIAG INJ IV PUSH: CPT

## 2023-05-10 PROCEDURE — 77386: CPT | Performed by: RADIOLOGY

## 2023-05-10 PROCEDURE — 96361 HYDRATE IV INFUSION ADD-ON: CPT

## 2023-05-10 PROCEDURE — 80053 COMPREHEN METABOLIC PANEL: CPT | Performed by: RADIOLOGY

## 2023-05-10 PROCEDURE — 25010000002 ONDANSETRON PER 1 MG: Performed by: RADIOLOGY

## 2023-05-10 PROCEDURE — 85007 BL SMEAR W/DIFF WBC COUNT: CPT

## 2023-05-10 PROCEDURE — 85025 COMPLETE CBC W/AUTO DIFF WBC: CPT

## 2023-05-10 RX ORDER — ONDANSETRON 2 MG/ML
4 INJECTION INTRAMUSCULAR; INTRAVENOUS ONCE
Status: CANCELLED | OUTPATIENT
Start: 2023-05-10

## 2023-05-10 RX ORDER — SODIUM CHLORIDE 0.9 % (FLUSH) 0.9 %
20 SYRINGE (ML) INJECTION AS NEEDED
Status: CANCELLED | OUTPATIENT
Start: 2023-05-10

## 2023-05-10 RX ORDER — HEPARIN SODIUM (PORCINE) LOCK FLUSH IV SOLN 100 UNIT/ML 100 UNIT/ML
500 SOLUTION INTRAVENOUS AS NEEDED
Status: CANCELLED | OUTPATIENT
Start: 2023-05-10

## 2023-05-10 RX ORDER — SODIUM CHLORIDE 9 MG/ML
1000 INJECTION, SOLUTION INTRAVENOUS ONCE
Status: COMPLETED | OUTPATIENT
Start: 2023-05-10 | End: 2023-05-10

## 2023-05-10 RX ORDER — SODIUM CHLORIDE 9 MG/ML
1000 INJECTION, SOLUTION INTRAVENOUS ONCE
Status: CANCELLED | OUTPATIENT
Start: 2023-05-10

## 2023-05-10 RX ORDER — HEPARIN SODIUM (PORCINE) LOCK FLUSH IV SOLN 100 UNIT/ML 100 UNIT/ML
500 SOLUTION INTRAVENOUS AS NEEDED
Status: DISCONTINUED | OUTPATIENT
Start: 2023-05-10 | End: 2023-05-12 | Stop reason: HOSPADM

## 2023-05-10 RX ORDER — ONDANSETRON 2 MG/ML
4 INJECTION INTRAMUSCULAR; INTRAVENOUS ONCE
Status: COMPLETED | OUTPATIENT
Start: 2023-05-10 | End: 2023-05-10

## 2023-05-10 RX ORDER — SODIUM CHLORIDE 0.9 % (FLUSH) 0.9 %
20 SYRINGE (ML) INJECTION AS NEEDED
Status: DISCONTINUED | OUTPATIENT
Start: 2023-05-10 | End: 2023-05-12 | Stop reason: HOSPADM

## 2023-05-10 RX ADMIN — ONDANSETRON 4 MG: 2 INJECTION INTRAMUSCULAR; INTRAVENOUS at 09:58

## 2023-05-10 RX ADMIN — SODIUM CHLORIDE 1000 ML: 9 INJECTION, SOLUTION INTRAVENOUS at 09:52

## 2023-05-10 RX ADMIN — Medication 500 UNITS: at 11:10

## 2023-05-10 NOTE — PROGRESS NOTES
Critical value called from ONS nurse, Nathen, Platelet count of 43 at 1109.  Our office spoke to Latricia, Dr. Cohen's nurse at 1120, she stated that Dr. Cohen is out of the office but will make him aware of the result.  Dr. Weiner also aware of result.

## 2023-05-10 NOTE — PROGRESS NOTES
Outpatient Nutrition Oncology Follow Up    Patient Name: Carina Serna  YOB: 1960  MRN: [id number]      Reason for Consult:  Radiation tx f/u; EN f/u       Today's Weight:  49.5 kg    Weight Change: 3.5% loss in 1 week; total 7-8% loss in the past month    Nutrition-related concerns: Nausea/Vomitting and Dysphagia/odynophagia    Current PO intake:  Minimal PO intake (mainly popsicles, jello)    Current EN RX:  Unable to tolerate EN feeds at this time.  Current formula- Osmolite 1.5, goal is 5 total cans/day to meet 100% of kcal / protein needs, however experiencing emesis upon attempts.  Pt had advanced to 3 cans on average daily (last week).    Consult or Intervention:  Pt reports severe n/v over this past weekend and felt like possibly related to a GI virus, not infusions.  She is unable to administer her EN feeds without vomiting (X 5 days).  To receive IV fluids today.  She had an issue with receiving an appropriate amount of Zofran from her pharmacy related to insurance, but Radiation Oncology nursing staff is looking into this.  If Zofran doesn't help alleviate n/v in the future, may consider adding Compazine.  Encouraged pt to only give 2 oz of EN formula at a time (due to emesis) and wait at least 1-2 hours before trying to give another 2 oz.  Acknowledge that this is inadequate to meet her nutritional needs, however keeping some formula down is the goal.      Nutrition Diagnosis: Severe malnutrition related to Inability to consume sufficient energy as evidenced by physiological causes increasing nutrient needs., hypermetabolic state., inadequate energy intake., decreased appetite., patient report. and persistent n/v, 3.5% loss in 1 week, <50% EEE X 5 days..    Plan: Will follow up per oncology nutrition protocol    Will check in w/ pt tomorrow after receiving IV fluids & hopefully getting issue with Zofran resolved.       *If vomiting unable to be resolved within 24 hours, will suggest  EN pump and / or inpatient visit to begin pump feeds to provide proper nutrition / hydration.

## 2023-05-11 ENCOUNTER — HOSPITAL ENCOUNTER (OUTPATIENT)
Dept: RADIATION ONCOLOGY | Facility: HOSPITAL | Age: 63
Discharge: HOME | End: 2023-05-11

## 2023-05-11 ENCOUNTER — APPOINTMENT (OUTPATIENT)
Dept: RADIATION ONCOLOGY | Facility: HOSPITAL | Age: 63
End: 2023-05-11
Payer: COMMERCIAL

## 2023-05-11 LAB
RAD ONC ARIA COURSE ID: NORMAL
RAD ONC ARIA COURSE INTENT: NORMAL
RAD ONC ARIA COURSE LAST TREATMENT DATE: NORMAL
RAD ONC ARIA COURSE START DATE: NORMAL
RAD ONC ARIA COURSE TREATMENT ELAPSED DAYS: 31
RAD ONC ARIA FIRST TREATMENT DATE: NORMAL
RAD ONC ARIA PLAN FRACTIONS TREATED TO DATE: 24
RAD ONC ARIA PLAN ID: NORMAL
RAD ONC ARIA PLAN PRESCRIBED DOSE PER FRACTION: 2 GY
RAD ONC ARIA PLAN PRIMARY REFERENCE POINT: NORMAL
RAD ONC ARIA PLAN TOTAL FRACTIONS PRESCRIBED: 35
RAD ONC ARIA PLAN TOTAL PRESCRIBED DOSE: 7000 CGY
RAD ONC ARIA REFERENCE POINT DOSAGE GIVEN TO DATE: 48 GY
RAD ONC ARIA REFERENCE POINT ID: NORMAL
RAD ONC ARIA REFERENCE POINT SESSION DOSAGE GIVEN: 2 GY

## 2023-05-11 PROCEDURE — 77386: CPT | Performed by: RADIOLOGY

## 2023-05-11 PROCEDURE — 77014 CHG CT GUIDANCE RADIATION THERAPY FLDS PLACEMENT: CPT | Performed by: RADIOLOGY

## 2023-05-11 NOTE — PROGRESS NOTES
5/11/23:    Brief f/u with pt today.  Reports to be feeling a bit better after receiving IV fluids & IV Zofran.  She plans to advance her feeds back up to 3 cans/day.  Eating / drinking minimal at this time.  For EN feeds, pt needs a minimum of 4 cans Osmolite 1.5 daily to meet nutritional needs + additional 120 ml h20 flush w/ each feeding (4X/day).  Will provide written reminder to pt tomorrow of this goal:  4 total cans Osmolite 1.5 daily + 120 ml h20 flush 4 X day.  If pt continues to experience wt decline after reaching this goal, will suggest to advance to 4.5 total cans/day + 120 ml h20 flush 4-5 X day.    Oncology RD will continue to monitor per protocol.

## 2023-05-12 ENCOUNTER — DOCUMENTATION (OUTPATIENT)
Dept: NUTRITION | Facility: HOSPITAL | Age: 63
End: 2023-05-12
Payer: COMMERCIAL

## 2023-05-12 ENCOUNTER — HOSPITAL ENCOUNTER (OUTPATIENT)
Dept: RADIATION ONCOLOGY | Facility: HOSPITAL | Age: 63
Discharge: HOME | End: 2023-05-12

## 2023-05-12 VITALS — WEIGHT: 111.33 LBS | BODY MASS INDEX: 19.88 KG/M2

## 2023-05-12 LAB
RAD ONC ARIA COURSE ID: NORMAL
RAD ONC ARIA COURSE INTENT: NORMAL
RAD ONC ARIA COURSE LAST TREATMENT DATE: NORMAL
RAD ONC ARIA COURSE START DATE: NORMAL
RAD ONC ARIA COURSE TREATMENT ELAPSED DAYS: 32
RAD ONC ARIA FIRST TREATMENT DATE: NORMAL
RAD ONC ARIA PLAN FRACTIONS TREATED TO DATE: 25
RAD ONC ARIA PLAN ID: NORMAL
RAD ONC ARIA PLAN PRESCRIBED DOSE PER FRACTION: 2 GY
RAD ONC ARIA PLAN PRIMARY REFERENCE POINT: NORMAL
RAD ONC ARIA PLAN TOTAL FRACTIONS PRESCRIBED: 35
RAD ONC ARIA PLAN TOTAL PRESCRIBED DOSE: 7000 CGY
RAD ONC ARIA REFERENCE POINT DOSAGE GIVEN TO DATE: 50 GY
RAD ONC ARIA REFERENCE POINT ID: NORMAL
RAD ONC ARIA REFERENCE POINT SESSION DOSAGE GIVEN: 2 GY

## 2023-05-12 PROCEDURE — 77386: CPT | Performed by: RADIOLOGY

## 2023-05-12 PROCEDURE — 77336 RADIATION PHYSICS CONSULT: CPT | Performed by: RADIOLOGY

## 2023-05-12 PROCEDURE — 77014 CHG CT GUIDANCE RADIATION THERAPY FLDS PLACEMENT: CPT | Performed by: RADIOLOGY

## 2023-05-12 RX ORDER — PROCHLORPERAZINE MALEATE 10 MG
10 TABLET ORAL EVERY 6 HOURS PRN
Qty: 30 TABLET | Refills: 1 | Status: SHIPPED | OUTPATIENT
Start: 2023-05-12

## 2023-05-12 NOTE — PROGRESS NOTES
"Outpatient Nutrition Oncology Follow Up    Patient Name: Carina Serna  YOB: 1960  MRN: [id number]    Recommendation(s):   Slowly increase EN to recommended goal rate of 4-4.5 cans/day of Osmolite 1.5  Add antiemetic to aid nausea  Consume a few bites of liquid consistency foods such as cream of wheat or thinned mashed potatoes prior to EN infusion - following SLP recs of liquidy puree     Reason for Consult: XRT Follow Up  Dx: Primary tongue squamous cell carcinoma  Cancer Tx: XRT to Head and Neck, Paclitaxel, Carboplatin     Today's Weight: 50.5 kg  Weight Change: recent desired weight gain of 1 kg in 2-3 days. Overall, pt with weight loss of 8% X 1 month    Nutrition-related concerns: Nausea, dysphagia / odynophagia     Current PO intake: Minimal amounts (mainly popsicles and jello); some thin liquids     Current EN RX: Osmolite 1.5 bolus 2.5-3 cans/day + 120ml h20 flush TID- see below    Consult or Intervention:   Spoke with pt during radiation oncology. Pt reports she tried a different EN schedule yesterday and did better with EN. Pt infused EN after radiation tx instead of before tx and feels she was less nauseated. She wonders if infusing EN on an empty stomach first thing in the morning causes some nausea. She also reports she is not able to get in a full can at each feeding as she feels her stomach \"may have shrunk.\" Pt only infusing around 2/3 of container or around 5-6 ounces at a time. Discussed with pt how to infuse EN in small amounts (5 ounces) but every 2 hours throughout the day. Discussed how to refrigerate left over EN formula and how to properly let formula return to room temperature before infusing into feeding tube.  Pt has tried gravity method in the past but reports this is difficult to do with family/ daughter's schedule - reports it takes too long for EN to infuse. Pt would like to continue with bolus method.   Also discussed with pt possibly trying liquid consistency " foods of cream of wheat or thinned mashed potatoes prior to EN infusion (explained to pt only to take a few bites prior to infusion and wait 20-30 minutes before infusing EN to see if this helps nausea associated with EN infusions). Pt agreed to try this.  She also spoke with RN regarding possibly adding another antiemetic.     Nutrition Focused Physical Findings:      Estimated Nutrition Needs:      Nutrition Diagnosis: Severe malnutrition related to increased nutrient needs due to catabolic disease as evidenced by physiological causes increasing nutrient needs., hypermetabolic state., muscle wasting., fat loss. and inadequate energy intake.    Plan: Will follow up per oncology nutrition protocol    Electronically signed by:  Dennise Stone RD  05/12/23 09:37 EDT

## 2023-05-15 ENCOUNTER — HOSPITAL ENCOUNTER (OUTPATIENT)
Dept: ONCOLOGY | Facility: HOSPITAL | Age: 63
Discharge: HOME | End: 2023-05-15
Admitting: INTERNAL MEDICINE
Payer: COMMERCIAL

## 2023-05-15 ENCOUNTER — HOSPITAL ENCOUNTER (OUTPATIENT)
Dept: RADIATION ONCOLOGY | Facility: HOSPITAL | Age: 63
Discharge: HOME | End: 2023-05-15

## 2023-05-15 ENCOUNTER — OFFICE VISIT (OUTPATIENT)
Dept: ONCOLOGY | Facility: HOSPITAL | Age: 63
End: 2023-05-15
Payer: COMMERCIAL

## 2023-05-15 VITALS
HEART RATE: 83 BPM | DIASTOLIC BLOOD PRESSURE: 73 MMHG | TEMPERATURE: 97.2 F | RESPIRATION RATE: 18 BRPM | WEIGHT: 110.45 LBS | HEIGHT: 63 IN | SYSTOLIC BLOOD PRESSURE: 139 MMHG | OXYGEN SATURATION: 98 % | BODY MASS INDEX: 19.57 KG/M2

## 2023-05-15 VITALS
RESPIRATION RATE: 18 BRPM | SYSTOLIC BLOOD PRESSURE: 139 MMHG | OXYGEN SATURATION: 98 % | BODY MASS INDEX: 19.57 KG/M2 | HEIGHT: 63 IN | WEIGHT: 110.45 LBS | TEMPERATURE: 97.2 F | DIASTOLIC BLOOD PRESSURE: 73 MMHG | HEART RATE: 83 BPM

## 2023-05-15 DIAGNOSIS — D69.59 CHEMOTHERAPY-INDUCED THROMBOCYTOPENIA: ICD-10-CM

## 2023-05-15 DIAGNOSIS — C02.9 PRIMARY TONGUE SQUAMOUS CELL CARCINOMA: Primary | ICD-10-CM

## 2023-05-15 DIAGNOSIS — T45.1X5A CHEMOTHERAPY-INDUCED THROMBOCYTOPENIA: ICD-10-CM

## 2023-05-15 DIAGNOSIS — Z45.2 ENCOUNTER FOR ADJUSTMENT OR MANAGEMENT OF VASCULAR ACCESS DEVICE: ICD-10-CM

## 2023-05-15 LAB
ALBUMIN SERPL-MCNC: 4.2 G/DL (ref 3.5–5.2)
ALBUMIN/GLOB SERPL: 1.4 G/DL
ALP SERPL-CCNC: 107 U/L (ref 39–117)
ALT SERPL W P-5'-P-CCNC: 12 U/L (ref 1–33)
ANION GAP SERPL CALCULATED.3IONS-SCNC: 11.4 MMOL/L (ref 5–15)
AST SERPL-CCNC: 13 U/L (ref 1–32)
BASOPHILS # BLD AUTO: 0 10*3/MM3 (ref 0–0.2)
BASOPHILS NFR BLD AUTO: 0 % (ref 0–1.5)
BILIRUB SERPL-MCNC: 0.2 MG/DL (ref 0–1.2)
BUN SERPL-MCNC: 14 MG/DL (ref 8–23)
BUN/CREAT SERPL: 20 (ref 7–25)
CALCIUM SPEC-SCNC: 9.8 MG/DL (ref 8.6–10.5)
CHLORIDE SERPL-SCNC: 98 MMOL/L (ref 98–107)
CO2 SERPL-SCNC: 25.6 MMOL/L (ref 22–29)
CREAT SERPL-MCNC: 0.7 MG/DL (ref 0.57–1)
DEPRECATED RDW RBC AUTO: 44.3 FL (ref 37–54)
EGFRCR SERPLBLD CKD-EPI 2021: 97.3 ML/MIN/1.73
EOSINOPHIL # BLD AUTO: 0.01 10*3/MM3 (ref 0–0.4)
EOSINOPHIL NFR BLD AUTO: 0.6 % (ref 0.3–6.2)
ERYTHROCYTE [DISTWIDTH] IN BLOOD BY AUTOMATED COUNT: 14.7 % (ref 12.3–15.4)
GLOBULIN UR ELPH-MCNC: 2.9 GM/DL
GLUCOSE SERPL-MCNC: 121 MG/DL (ref 65–99)
HCT VFR BLD AUTO: 25.4 % (ref 34–46.6)
HGB BLD-MCNC: 8.7 G/DL (ref 12–15.9)
IMM GRANULOCYTES # BLD AUTO: 0 10*3/MM3 (ref 0–0.05)
IMM GRANULOCYTES NFR BLD AUTO: 0 % (ref 0–0.5)
LYMPHOCYTES # BLD AUTO: 0.24 10*3/MM3 (ref 0.7–3.1)
LYMPHOCYTES NFR BLD AUTO: 14.3 % (ref 19.6–45.3)
MAGNESIUM SERPL-MCNC: 1.8 MG/DL (ref 1.6–2.4)
MCH RBC QN AUTO: 30.3 PG (ref 26.6–33)
MCHC RBC AUTO-ENTMCNC: 34.3 G/DL (ref 31.5–35.7)
MCV RBC AUTO: 88.5 FL (ref 79–97)
MONOCYTES # BLD AUTO: 0.1 10*3/MM3 (ref 0.1–0.9)
MONOCYTES NFR BLD AUTO: 6 % (ref 5–12)
NEUTROPHILS NFR BLD AUTO: 1.33 10*3/MM3 (ref 1.7–7)
NEUTROPHILS NFR BLD AUTO: 79.1 % (ref 42.7–76)
PLATELET # BLD AUTO: 89 10*3/MM3 (ref 140–450)
PMV BLD AUTO: 10.4 FL (ref 6–12)
POTASSIUM SERPL-SCNC: 4 MMOL/L (ref 3.5–5.2)
PROT SERPL-MCNC: 7.1 G/DL (ref 6–8.5)
RAD ONC ARIA COURSE ID: NORMAL
RAD ONC ARIA COURSE INTENT: NORMAL
RAD ONC ARIA COURSE LAST TREATMENT DATE: NORMAL
RAD ONC ARIA COURSE START DATE: NORMAL
RAD ONC ARIA COURSE TREATMENT ELAPSED DAYS: 35
RAD ONC ARIA FIRST TREATMENT DATE: NORMAL
RAD ONC ARIA PLAN FRACTIONS TREATED TO DATE: 26
RAD ONC ARIA PLAN ID: NORMAL
RAD ONC ARIA PLAN PRESCRIBED DOSE PER FRACTION: 2 GY
RAD ONC ARIA PLAN PRIMARY REFERENCE POINT: NORMAL
RAD ONC ARIA PLAN TOTAL FRACTIONS PRESCRIBED: 35
RAD ONC ARIA PLAN TOTAL PRESCRIBED DOSE: 7000 CGY
RAD ONC ARIA REFERENCE POINT DOSAGE GIVEN TO DATE: 52 GY
RAD ONC ARIA REFERENCE POINT ID: NORMAL
RAD ONC ARIA REFERENCE POINT SESSION DOSAGE GIVEN: 2 GY
RBC # BLD AUTO: 2.87 10*6/MM3 (ref 3.77–5.28)
SODIUM SERPL-SCNC: 135 MMOL/L (ref 136–145)
WBC NRBC COR # BLD: 1.68 10*3/MM3 (ref 3.4–10.8)

## 2023-05-15 PROCEDURE — 25010000002 PALONOSETRON PER 25 MCG: Performed by: INTERNAL MEDICINE

## 2023-05-15 PROCEDURE — 85025 COMPLETE CBC W/AUTO DIFF WBC: CPT | Performed by: INTERNAL MEDICINE

## 2023-05-15 PROCEDURE — 25010000002 POTASSIUM CHLORIDE PER 2 MEQ OF POTASSIUM: Performed by: INTERNAL MEDICINE

## 2023-05-15 PROCEDURE — 96375 TX/PRO/DX INJ NEW DRUG ADDON: CPT

## 2023-05-15 PROCEDURE — 96367 TX/PROPH/DG ADDL SEQ IV INF: CPT

## 2023-05-15 PROCEDURE — 25010000002 DEXAMETHASONE SODIUM PHOSPHATE 120 MG/30ML SOLUTION: Performed by: INTERNAL MEDICINE

## 2023-05-15 PROCEDURE — 25010000002 CISPLATIN PER 50 MG: Performed by: INTERNAL MEDICINE

## 2023-05-15 PROCEDURE — 96413 CHEMO IV INFUSION 1 HR: CPT

## 2023-05-15 PROCEDURE — 83735 ASSAY OF MAGNESIUM: CPT | Performed by: INTERNAL MEDICINE

## 2023-05-15 PROCEDURE — 80053 COMPREHEN METABOLIC PANEL: CPT | Performed by: INTERNAL MEDICINE

## 2023-05-15 PROCEDURE — 96365 THER/PROPH/DIAG IV INF INIT: CPT

## 2023-05-15 PROCEDURE — 77014 CHG CT GUIDANCE RADIATION THERAPY FLDS PLACEMENT: CPT | Performed by: RADIOLOGY

## 2023-05-15 PROCEDURE — 25010000002 FOSAPREPITANT PER 1 MG: Performed by: INTERNAL MEDICINE

## 2023-05-15 PROCEDURE — 25010000002 HEPARIN LOCK FLUSH PER 10 UNITS: Performed by: INTERNAL MEDICINE

## 2023-05-15 PROCEDURE — 77386: CPT | Performed by: RADIOLOGY

## 2023-05-15 PROCEDURE — 25010000002 MAGNESIUM SULFATE PER 500 MG OF MAGNESIUM: Performed by: INTERNAL MEDICINE

## 2023-05-15 RX ORDER — HEPARIN SODIUM (PORCINE) LOCK FLUSH IV SOLN 100 UNIT/ML 100 UNIT/ML
500 SOLUTION INTRAVENOUS AS NEEDED
Status: DISCONTINUED | OUTPATIENT
Start: 2023-05-15 | End: 2023-05-16 | Stop reason: HOSPADM

## 2023-05-15 RX ORDER — OLANZAPINE 5 MG/1
5 TABLET ORAL NIGHTLY
Qty: 3 TABLET | Refills: 5 | Status: CANCELLED | OUTPATIENT
Start: 2023-05-15

## 2023-05-15 RX ORDER — SODIUM CHLORIDE 0.9 % (FLUSH) 0.9 %
20 SYRINGE (ML) INJECTION AS NEEDED
Status: CANCELLED | OUTPATIENT
Start: 2023-05-15

## 2023-05-15 RX ORDER — ONDANSETRON HYDROCHLORIDE 8 MG/1
8 TABLET, FILM COATED ORAL 3 TIMES DAILY PRN
Qty: 30 TABLET | Refills: 5 | Status: CANCELLED | OUTPATIENT
Start: 2023-05-15

## 2023-05-15 RX ORDER — SODIUM CHLORIDE 0.9 % (FLUSH) 0.9 %
20 SYRINGE (ML) INJECTION AS NEEDED
Status: DISCONTINUED | OUTPATIENT
Start: 2023-05-15 | End: 2023-05-16 | Stop reason: HOSPADM

## 2023-05-15 RX ORDER — PALONOSETRON 0.05 MG/ML
0.25 INJECTION, SOLUTION INTRAVENOUS ONCE
Status: CANCELLED | OUTPATIENT
Start: 2023-05-15

## 2023-05-15 RX ORDER — OLANZAPINE 5 MG/1
5 TABLET ORAL ONCE
Status: CANCELLED | OUTPATIENT
Start: 2023-05-15 | End: 2023-05-15

## 2023-05-15 RX ORDER — OLANZAPINE 5 MG/1
5 TABLET ORAL NIGHTLY
Qty: 3 TABLET | Refills: 5 | Status: SHIPPED | OUTPATIENT
Start: 2023-05-15

## 2023-05-15 RX ORDER — OLANZAPINE 2.5 MG/1
5 TABLET ORAL ONCE
Status: COMPLETED | OUTPATIENT
Start: 2023-05-15 | End: 2023-05-15

## 2023-05-15 RX ORDER — SODIUM CHLORIDE 9 MG/ML
250 INJECTION, SOLUTION INTRAVENOUS ONCE
Status: CANCELLED | OUTPATIENT
Start: 2023-05-15

## 2023-05-15 RX ORDER — PALONOSETRON 0.05 MG/ML
0.25 INJECTION, SOLUTION INTRAVENOUS ONCE
Status: COMPLETED | OUTPATIENT
Start: 2023-05-15 | End: 2023-05-15

## 2023-05-15 RX ORDER — HEPARIN SODIUM (PORCINE) LOCK FLUSH IV SOLN 100 UNIT/ML 100 UNIT/ML
500 SOLUTION INTRAVENOUS AS NEEDED
Status: CANCELLED | OUTPATIENT
Start: 2023-05-15

## 2023-05-15 RX ORDER — SODIUM CHLORIDE 9 MG/ML
250 INJECTION, SOLUTION INTRAVENOUS ONCE
Status: COMPLETED | OUTPATIENT
Start: 2023-05-15 | End: 2023-05-15

## 2023-05-15 RX ADMIN — PALONOSETRON 0.25 MG: 0.05 INJECTION, SOLUTION INTRAVENOUS at 11:40

## 2023-05-15 RX ADMIN — Medication 20 ML: at 14:39

## 2023-05-15 RX ADMIN — CISPLATIN 60 MG: 1 INJECTION, SOLUTION INTRAVENOUS at 12:38

## 2023-05-15 RX ADMIN — FOSAPREPITANT 100 ML: 150 INJECTION, POWDER, LYOPHILIZED, FOR SOLUTION INTRAVENOUS at 12:05

## 2023-05-15 RX ADMIN — POTASSIUM CHLORIDE 500 ML: 2 INJECTION, SOLUTION, CONCENTRATE INTRAVENOUS at 13:40

## 2023-05-15 RX ADMIN — DEXAMETHASONE SODIUM PHOSPHATE 12 MG: 4 INJECTION, SOLUTION INTRA-ARTICULAR; INTRALESIONAL; INTRAMUSCULAR; INTRAVENOUS; SOFT TISSUE at 11:43

## 2023-05-15 RX ADMIN — SODIUM CHLORIDE 250 ML: 9 INJECTION, SOLUTION INTRAVENOUS at 10:31

## 2023-05-15 RX ADMIN — OLANZAPINE 5 MG: 2.5 TABLET, FILM COATED ORAL at 11:35

## 2023-05-15 RX ADMIN — HEPARIN SODIUM (PORCINE) LOCK FLUSH IV SOLN 100 UNIT/ML 500 UNITS: 100 SOLUTION at 14:39

## 2023-05-15 RX ADMIN — POTASSIUM CHLORIDE 500 ML: 2 INJECTION, SOLUTION, CONCENTRATE INTRAVENOUS at 10:31

## 2023-05-15 NOTE — PROGRESS NOTES
Outpatient Nutrition Oncology Follow Up    Patient Name: Carina Serna  YOB: 1960  MRN: [id number]    Recommendation(s):   Slowly increase EN to recommended goal rate of 4-4.5 cans/day of Osmolite 1.5  Add antiemetic to aid nausea  Consume a few bites of liquid consistency foods such as cream of wheat or thinned mashed potatoes prior to EN infusion - following SLP recs of liquidy puree     Reason for Consult:  EN f/u         Today's Weight:  50.1 kg    Weight Change:  .7 kg loss in 1 week (1.4%)    Nutrition-related concerns: Nausea and Dysphagia/odynophagia    Current PO intake:  Minimal (popsicles, jello, some thin liquids)    Current EN RX:  Osmolite 1.5, bolus feeds of 3 cans/day + 120 mL h20 flush TID    Consult or Intervention:  Spoke w/ pt briefly today at infusion center.  She reports her nausea has improved greatly since last week.  Pt notes that she may have been holding the syringe up a bit more than necessary and this was causing increased nausea, therefore emesis occurred.  She is gradually working up to 4 cans/day with goal of 4.5 cans total/day.    Nutrition Diagnosis: Severe malnutrition related to Inability to consume sufficient energy as evidenced by physiological causes increasing nutrient needs., hypermetabolic state., muscle wasting., fat loss. and inadequate energy intake.    Plan: Will follow up per oncology nutrition protocol

## 2023-05-15 NOTE — PROGRESS NOTES
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    Ms. Serna Is a very pleasant 63-year-old female who presents for annual medical oncology evaluation is due to new diagnosis of head and neck cancer at base of tongue.  She has a benign past medical history.  She has quit smoking.  She has been evaluated by Dr. Weiner with radiation oncology and has beenstarted on chemotherapy and radiation. She got C3 chemo 3 weeks ago with Carboplatin and 5-FU. She tolerated well overall. She did have some vomiting last week but thinks this was due to stomach virus and not the chemo. She is having trouble swallowing so is using the PEG tube for feeds. She is having increased pain from the radiation but is using topical numbing liquid to help with this. She denies fevers, chills, neuropathy, chest pain, diarrhea. Will switch her to Cisplatin for last two cycles.    Oncology/Hematology History Overview Note   1/19/23: Referred to Dr. Escobar for evaluation after several months of right sided sore throat. She underwent flexible fiberoptic laryngoscopy revealing a mass at the right lateral base of tongue and glossotonsillar sulcus.  Pathology from biopsy revealed HPV negative, moderately differentiated squamous cell carcinoma.      1/25/2023: CT scan of the soft tissue neck demonstrated a 2.7 x 2.3 cm abnormal soft tissue density likely representing neoplasm centered at the lateral right oropharynx with extension toward the lingual tonsil.  There is a moderately suspicious rounded right level 2 lymph node measuring 0.7 cm in the short axis.  CT scan of the chest revealed a subcarinal lymph node at the upper limit of normal for size.      3/6/23: NM PET: 1. Intense abnormal radiopharmaceutical accumulation corresponding to the soft tissue  mass at the   right aspect of the oropharynx consistent with malignancy.  2. Mildly increased radiopharmaceutical accumulation corresponding to lymph nodes along the   cervical/jugular chain bilaterally indicating developing malignant metastatic lymphadenopathy.    3. No evidence for additional malignancy or more distal metastatic disease.    4. Mild to moderate changes of emphysema/COPD are noted.  5. Coronary artery calcifications are observed.  6. Areas of ill-defined ground-glass density in the right upper lobe.  These findings may relate to   areas of scarring.  Developing infiltrates in the setting of right upper lobe pneumonia could be   considered.  Recommend correlation for signs or symptoms of acute infection.  Also recommend   follow-up CT of the chest in 6 months to document stability versus resolution.    4/10/23: C1D1 Cisplatin weekly. First dose of radiation.     4/17/23: C2 chemo, switched to Carbo/taxol due to shortage of Cisplatin. Comlicated by reaction to Taxol. She did receive Carboplatin    4/23/23: C3 chemo, switched to Carboplatin/infusional 5-FU    5/15/23: C4 chemo, switched back to weekly Cisplatin       Primary tongue squamous cell carcinoma   1/19/2023 Initial Diagnosis    Primary tongue squamous cell carcinoma (HCC)     4/10/2023 - 4/10/2023 Chemotherapy    OP HEAD & NECK CISplatin (Weekly) + XRT     4/24/2023 - 4/28/2023 Chemotherapy    OP HEAD & NECK CARBOplatin / Fluorouracil CIV + XRT     5/15/2023 -  Chemotherapy    OP HEAD & NECK CISplatin (Weekly) + XRT     Primary squamous cell carcinoma of base of tongue   2/24/2023 Initial Diagnosis    Primary squamous cell carcinoma of base of tongue     4/17/2023 - 4/17/2023 Biopsy    OP HEAD & NECK PACLitaxel 50 mg/m2 / CARBOplatin AUC=2 (weekly) + XRT  Plan Provider: Alexa Connelly MD PhD  Treatment goal: Curative  Line of treatment: [No plan line of treatment]     5/10/2023 -  Chemotherapy    OP HYDRATION AND ANTIEMETICS     Malignant  neoplasm of base of tongue (Resolved)   3/30/2023 Initial Diagnosis    Malignant neoplasm of base of tongue (HCC)     3/30/2023 -  Radiation    RADIATION THERAPY Treatment Details (Noted on 3/30/2023)  Site: Head and Neck  Technique: IMRT  Goal: No goal specified  Planned Treatment Start Date: No planned start date specified         Review of Systems   Constitutional: Negative for fatigue.   HENT: Positive for sore throat.    All other systems reviewed and are negative.    Current Outpatient Medications on File Prior to Visit   Medication Sig Dispense Refill   • aluminum-magnesium hydroxide-simethicone (Maalox Max) 400-400-40 MG/5ML suspension Take 10 mL by mouth Every 6 (Six) Hours As Needed for Indigestion or Heartburn. 355 mL 0   • chlorhexidine (PERIDEX) 0.12 % solution RINSE AND GARGLE 15 ML BY MOUTH THREE TIMES DAILY SWISH AND SPIT. DO NOT SWALLOW (Patient not taking: Reported on 4/24/2023)     • diphenhydrAMINE 12.5 MG/5ML elixir 20 mL, aluminum-magnesium hydroxide-simethicone 400-400-40 MG/5ML suspension 20 mL, Lidocaine Viscous HCl 2 % solution 20 mL Swish and spit 15 mL Every 4 (Four) Hours As Needed for Mucositis. 300 mL 2   • FLUoxetine (PROzac) 10 MG tablet Take 1 tablet by mouth Daily.     • HYDROcodone-acetaminophen (NORCO) 7.5-325 MG per tablet Take 1 tablet by mouth Every 6 (Six) Hours As Needed for Moderate Pain. 60 tablet 0   • ibuprofen (ADVIL,MOTRIN) 600 MG tablet TAKE 1 TABLET BY MOUTH EVERY 6 HOURS. NOT TO EXCEED 3200 MG DAILY     • Lidocaine Viscous HCl (XYLOCAINE) 2 % solution Take 2 mL by mouth As Needed for Mild Pain. Mix with Maalox every 6 hours 100 mL 0   • losartan (COZAAR) 25 MG tablet Take 1 tablet by mouth Daily.     • Nystatin-Diphenhydramine-Hydrocortisone-Lidocaine Swish and spit 5 mL Every 3 (Three) Hours As Needed. 300 mL 1   • ondansetron (ZOFRAN) 8 MG tablet Take 1 tablet by mouth Every 8 (Eight) Hours As Needed for Nausea or Vomiting. 30 tablet 2   • prochlorperazine  (COMPAZINE) 10 MG tablet Take 1 tablet by mouth Every 6 (Six) Hours As Needed for Nausea or Vomiting. 30 tablet 1     No current facility-administered medications on file prior to visit.       Allergies   Allergen Reactions   • Paclitaxel Anaphylaxis     Past Medical History:   Diagnosis Date   • Dental disease    • PONV (postoperative nausea and vomiting)    • Primary squamous cell carcinoma of base of tongue     NEWLY DIAGNOSED AND NO TYPE OF TREATMENT DONE THUS FAR 23   • Sore throat      Past Surgical History:   Procedure Laterality Date   • BLADDER SURGERY     • DIRECT LARYNGOSCOPY, ESOPHAGOSCOPY, BRONCHOSCOPY N/A 02/10/2023    Procedure: DIRECT LARYNGOSCOPY, ESOPHAGOSCOPY, BRONCHOSCOPY;  Surgeon: Migel Escobar MD;  Location: [facility];  Service: ENT;  Laterality: N/A;   • EYE SURGERY      CATARACT REMOVED BILATERALLY   • HYSTERECTOMY     • PEG TUBE INSERTION N/A 2023    Procedure: PERCUTANEOUS ENDOSCOPIC GASTROSTOMY TUBE INSERTION;  Surgeon: Frank Ellington MD;  Location: [facility];  Service: General;  Laterality: N/A;   • TONSILLECTOMY     • VENOUS ACCESS DEVICE (PORT) INSERTION N/A 2023    Procedure: INSERTION OF PORTACATH ,insertion of percutaneoous endoscopic gastrostomy tube;  Surgeon: Frank Ellington MD;  Location: [facility] OR;  Service: General;  Laterality: N/A;     Social History     Socioeconomic History   • Marital status:    Tobacco Use   • Smoking status: Former     Packs/day: 1.00     Years: 30.00     Pack years: 30.00     Types: Cigarettes     Quit date: 2023     Years since quittin.2   • Smokeless tobacco: Never   Vaping Use   • Vaping Use: Never used   Substance and Sexual Activity   • Alcohol use: Not Currently   • Drug use: Never   • Sexual activity: Defer     Family History   Problem Relation Age of Onset   • Cancer Mother         Colon cancer   • Heart attack Father    • Colon cancer Maternal Aunt    • Malig Hyperthermia Neg Hx   "      Objective   Physical Exam    Well appearing patient in no acute distress on RA  Anicteric sclerae, + erythema on neck  Respirations non-labored  Awake, alert, and oriented x 4. Speech intact. No gross neurologic deficit  Appropriate mood and affect  No visible edema      ECOG 0.    Vitals:    05/15/23 0926   BP: 139/73   Pulse: 83   Resp: 18   Temp: 97.2 °F (36.2 °C)   SpO2: 98%   Weight: 50.1 kg (110 lb 7.2 oz)   Height: 159.4 cm (62.76\")   PainSc: 0-No pain     ECOG score: 0         PHQ-9 Total Score:         The patient is not  experiencing fatigue.   PT/OT Functional Screening: PT fx screen: No needs identified  Speech Functional Screening: Speech fx screen: No needs identified  Rehab to be ordered: Rehab to be ordered: No needs identified      Result Review :   The following data was reviewed by: Sarmad Cohen MD on 05/15/23:  Lab Results   Component Value Date    HGB 8.7 (L) 05/15/2023    HCT 25.4 (L) 05/15/2023    MCV 88.5 05/15/2023    PLT 89 (L) 05/15/2023    WBC 1.68 (L) 05/15/2023    NEUTROABS 1.33 (L) 05/15/2023    LYMPHSABS 0.24 (L) 05/15/2023    MONOSABS 0.10 05/15/2023    EOSABS 0.01 05/15/2023    BASOSABS 0.00 05/15/2023     Lab Results   Component Value Date    GLUCOSE 121 (H) 05/15/2023    BUN 14 05/15/2023    CREATININE 0.70 05/15/2023     (L) 05/15/2023    K 4.0 05/15/2023    CL 98 05/15/2023    CO2 25.6 05/15/2023    CALCIUM 9.8 05/15/2023    PROTEINTOT 7.1 05/15/2023    ALBUMIN 4.2 05/15/2023    BILITOT 0.2 05/15/2023    ALKPHOS 107 05/15/2023    AST 13 05/15/2023    ALT 12 05/15/2023     Lab Results   Component Value Date    MG 2.0 04/10/2023     Labs personally reviewed. Anemia and thrombocytopenia present. Creatinine normal.        No radiology results for the last 30 days.        Assessment and Plan    Diagnoses and all orders for this visit:    1. Primary tongue squamous cell carcinoma (Primary)  -     OLANZapine (zyPREXA) 5 MG tablet; Take 1 tablet by mouth Every Night. " Take on days 2, 3 and 4 after chemotherapy.  Dispense: 3 tablet; Refill: 5    2. Chemotherapy-induced thrombocytopenia    Other orders  -     Cancel: OLANZapine (zyPREXA) 5 MG tablet; Take 1 tablet by mouth Every Night. Take on days 2, 3 and 4 after chemotherapy.  Dispense: 3 tablet; Refill: 5  -     Cancel: ondansetron (ZOFRAN) 8 MG tablet; Take 1 tablet by mouth 3 (Three) Times a Day As Needed for Nausea or Vomiting.  Dispense: 30 tablet; Refill: 5  -     OK To Treat    Ms. Serna has biopsy proven HPV negative squamous cell carcinoma of right base of tongue.  Per CT imaging with contrast that has been obtained she has possible right-sided level 2 lymph node measuring 0.7 cm.  3/6/23 PET scan confirmed FDG avidity of lymph nodes involved. She has met with Dr. Weiner with radiation oncology. Patient has had plate placed in jaw so that she is able to start treatment.  Plan for concurrent chemoradiation with curative intent. Port and PEG placed. Treatment started on 4/10/23.     C1 Cisplatin 4/10/23. First dose of radiation this day as well.     C2 chemo Carbo/Taxol (Switched due to Cisplatin shortage) 4/17/23. Complicated by reaction to Taxol. Carboplatin received. Will avoid further Taxol.     C3 chemo with Carbo/96h infusional 5-FU 4/23/23.    C4 chemo to be switched back to weekly Cisplatin. Due 5/15/23.  Labs appropriate to proceed. Ok to treat order entered for low platelets Anti-emetics provided for PRN use. D2-4 olanzapine ordered.     C5 Weekly Cisplatin next week likely to be last dose of chemotherapy.       Anemia secondary to chemotherapy  Thrombocytopenia secondary to chemotherapy  No intervention necessary. No dose adjustment needed. Continue to monitor.     This is an acute or chronic illness that poses a threat to life or bodily function. The above treatment plan involves a high risk of complications and/or mortality of patient management. Continue with labs to monitor for severe toxicities.     I  spent 20 minutes caring for Carina on this date of service. This time includes time spent by me in the following activities:preparing for the visit, reviewing tests, obtaining and/or reviewing a separately obtained history, performing a medically appropriate examination and/or evaluation , counseling and educating the patient/family/caregiver, ordering medications, tests, or procedures, referring and communicating with other health care professionals , documenting information in the medical record, independently interpreting results and communicating that information with the patient/family/caregiver and care coordination    Patient Follow Up: Prior to C5 chemo in 1 week  Patient was given instructions and counseling regarding her condition or for health maintenance advice. Please see specific information pulled into the AVS if appropriate.

## 2023-05-16 ENCOUNTER — HOSPITAL ENCOUNTER (OUTPATIENT)
Dept: RADIATION ONCOLOGY | Facility: HOSPITAL | Age: 63
Discharge: HOME | End: 2023-05-16

## 2023-05-16 VITALS
HEART RATE: 89 BPM | RESPIRATION RATE: 16 BRPM | TEMPERATURE: 98.5 F | WEIGHT: 112.88 LBS | SYSTOLIC BLOOD PRESSURE: 164 MMHG | OXYGEN SATURATION: 98 % | DIASTOLIC BLOOD PRESSURE: 76 MMHG | BODY MASS INDEX: 20.15 KG/M2

## 2023-05-16 DIAGNOSIS — C01 PRIMARY SQUAMOUS CELL CARCINOMA OF BASE OF TONGUE: Primary | ICD-10-CM

## 2023-05-16 LAB
RAD ONC ARIA COURSE ID: NORMAL
RAD ONC ARIA COURSE INTENT: NORMAL
RAD ONC ARIA COURSE LAST TREATMENT DATE: NORMAL
RAD ONC ARIA COURSE START DATE: NORMAL
RAD ONC ARIA COURSE TREATMENT ELAPSED DAYS: 36
RAD ONC ARIA FIRST TREATMENT DATE: NORMAL
RAD ONC ARIA PLAN FRACTIONS TREATED TO DATE: 27
RAD ONC ARIA PLAN ID: NORMAL
RAD ONC ARIA PLAN PRESCRIBED DOSE PER FRACTION: 2 GY
RAD ONC ARIA PLAN PRIMARY REFERENCE POINT: NORMAL
RAD ONC ARIA PLAN TOTAL FRACTIONS PRESCRIBED: 35
RAD ONC ARIA PLAN TOTAL PRESCRIBED DOSE: 7000 CGY
RAD ONC ARIA REFERENCE POINT DOSAGE GIVEN TO DATE: 54 GY
RAD ONC ARIA REFERENCE POINT ID: NORMAL
RAD ONC ARIA REFERENCE POINT SESSION DOSAGE GIVEN: 2 GY

## 2023-05-16 PROCEDURE — 77014 CHG CT GUIDANCE RADIATION THERAPY FLDS PLACEMENT: CPT | Performed by: RADIOLOGY

## 2023-05-16 PROCEDURE — 77427 RADIATION TX MANAGEMENT X5: CPT | Performed by: RADIOLOGY

## 2023-05-16 PROCEDURE — 77386: CPT | Performed by: RADIOLOGY

## 2023-05-16 NOTE — PROGRESS NOTES
On Treatment Visit       Patient: Carina Serna   YOB: 1960   Medical Record Number: [id number]     Date of Visit  May 16, 2023   Primary Diagnosis:Primary squamous cell carcinoma of base of tongue [C01]  Cancer Staging: Cancer Staging   No matching staging information was found for the patient.       was seen today for an on treatment visit.  She is receiving radiation therapy to the head and neck. She  has received 5400 cGy in 27 fractions out of a planned dose of 7000 cGy in 35 fractions. She is currently receiving concurrent carboplatin/5-FU chemotherapy per Dr. Cohen.     Feels as though she is tolerating treatment really well.  Caring for skin as advised.  Improved nausea control                                     Review of Systems:   Review of Systems   Constitutional: Positive for appetite change (only eats popsicles, jello Getting 3 feeds daily in addition) and fatigue (3/10). Negative for unexpected weight change.        3 cans enteral nutrition   HENT: Positive for sore throat and trouble swallowing. Negative for tinnitus.    Eyes: Negative for visual disturbance.   Respiratory: Negative for cough and shortness of breath.    Gastrointestinal: Negative for constipation, diarrhea, nausea and vomiting.   Genitourinary: Negative for difficulty urinating, dysuria, frequency, hematuria and urgency.   Musculoskeletal: Positive for arthralgias and back pain. Negative for joint swelling.   Skin: Positive for color change (moderate erythema with hyperpigmentation to treatment area). Negative for rash.   Neurological: Negative for dizziness, light-headedness and headaches.   Psychiatric/Behavioral: Positive for sleep disturbance (toss and turns throughout night). Negative for agitation.       Vitals:     Vitals:    05/16/23 0951   BP: 164/76   Pulse: 89   Resp: 16   Temp: 98.5 °F (36.9 °C)   SpO2: 98%       Weight:   Wt Readings from Last 3 Encounters:   05/16/23 51.2 kg (112 lb 14  oz)   05/15/23 50.1 kg (110 lb 7.2 oz)   05/15/23 50.1 kg (110 lb 7.2 oz)      Pain:    Pain Score    05/16/23 0951   PainSc:   3         Physical Exam:  Gen: Thin; NAD  HEENT: MMM; rubor at neck bilaterally; posterior pharyngeal erythema/mucositis  Trachea: midline  Chest: symmetric  Resp: normal respiratory effort  Extr: warm, well-perfused  Neuro: awake and alert; no aphasia or neglect    Plan: I have reviewed treatment setup notes, checked and approved the daily guidance images.  I reviewed dose delivery, treatment parameters and deemed them appropriate. We plan to continue radiation therapy as prescribed.          Radiation Oncology    Electronically signed by Nikolai Weiner MD 5/16/2023  13:26 EDT

## 2023-05-17 ENCOUNTER — HOSPITAL ENCOUNTER (OUTPATIENT)
Dept: RADIATION ONCOLOGY | Facility: HOSPITAL | Age: 63
Discharge: HOME | End: 2023-05-17

## 2023-05-17 LAB
RAD ONC ARIA COURSE ID: NORMAL
RAD ONC ARIA COURSE INTENT: NORMAL
RAD ONC ARIA COURSE LAST TREATMENT DATE: NORMAL
RAD ONC ARIA COURSE START DATE: NORMAL
RAD ONC ARIA COURSE TREATMENT ELAPSED DAYS: 37
RAD ONC ARIA FIRST TREATMENT DATE: NORMAL
RAD ONC ARIA PLAN FRACTIONS TREATED TO DATE: 28
RAD ONC ARIA PLAN ID: NORMAL
RAD ONC ARIA PLAN PRESCRIBED DOSE PER FRACTION: 2 GY
RAD ONC ARIA PLAN PRIMARY REFERENCE POINT: NORMAL
RAD ONC ARIA PLAN TOTAL FRACTIONS PRESCRIBED: 35
RAD ONC ARIA PLAN TOTAL PRESCRIBED DOSE: 7000 CGY
RAD ONC ARIA REFERENCE POINT DOSAGE GIVEN TO DATE: 56 GY
RAD ONC ARIA REFERENCE POINT ID: NORMAL
RAD ONC ARIA REFERENCE POINT SESSION DOSAGE GIVEN: 2 GY

## 2023-05-17 PROCEDURE — 77014 CHG CT GUIDANCE RADIATION THERAPY FLDS PLACEMENT: CPT | Performed by: RADIOLOGY

## 2023-05-17 PROCEDURE — 77386: CPT | Performed by: RADIOLOGY

## 2023-05-18 ENCOUNTER — HOSPITAL ENCOUNTER (OUTPATIENT)
Dept: RADIATION ONCOLOGY | Facility: HOSPITAL | Age: 63
Discharge: HOME | End: 2023-05-18

## 2023-05-18 VITALS — BODY MASS INDEX: 19.91 KG/M2 | WEIGHT: 111.55 LBS

## 2023-05-18 LAB
RAD ONC ARIA COURSE ID: NORMAL
RAD ONC ARIA COURSE INTENT: NORMAL
RAD ONC ARIA COURSE LAST TREATMENT DATE: NORMAL
RAD ONC ARIA COURSE START DATE: NORMAL
RAD ONC ARIA COURSE TREATMENT ELAPSED DAYS: 38
RAD ONC ARIA FIRST TREATMENT DATE: NORMAL
RAD ONC ARIA PLAN FRACTIONS TREATED TO DATE: 29
RAD ONC ARIA PLAN ID: NORMAL
RAD ONC ARIA PLAN PRESCRIBED DOSE PER FRACTION: 2 GY
RAD ONC ARIA PLAN PRIMARY REFERENCE POINT: NORMAL
RAD ONC ARIA PLAN TOTAL FRACTIONS PRESCRIBED: 35
RAD ONC ARIA PLAN TOTAL PRESCRIBED DOSE: 7000 CGY
RAD ONC ARIA REFERENCE POINT DOSAGE GIVEN TO DATE: 58 GY
RAD ONC ARIA REFERENCE POINT ID: NORMAL
RAD ONC ARIA REFERENCE POINT SESSION DOSAGE GIVEN: 2 GY

## 2023-05-18 PROCEDURE — 77386: CPT | Performed by: RADIOLOGY

## 2023-05-18 PROCEDURE — 77014 CHG CT GUIDANCE RADIATION THERAPY FLDS PLACEMENT: CPT | Performed by: RADIOLOGY

## 2023-05-18 NOTE — PROGRESS NOTES
Outpatient Nutrition Oncology Follow Up    Patient Name: Carina Serna  YOB: 1960  MRN: [id number]    Recommendation(s):     EN goal:  4 to 4.5 total cans/day of Osmolite 1.5 (bolus / syringe feeds) + 120 mL h20 flush w/ each feeding  PO foods as tolerated (SLP rec's liquidy puree)      Reason for Consult:  Radiation tx f/u & EN f/u 5/18/23       Today's Weight:   50.6 kg    Weight Change:    Wt stable compared to 1 week ago  5% wt decline total in 1 month    Nutrition-related concerns: Anorexia, Early Satiety and Dysphagia/odynophagia    Current PO intake:  Minimal (popsicles, jello, some liquids)    Current EN RX:  Osmolite 1.5, bolus feeds of 3-4 cans/day + 120 mL h20 flush with each feeding    Consult or Intervention:  Pt reports the improvement in nausea continues after adding Compazine & Zyprexa.  Doing well with feeds and no intolerance / nausea reported.  As noted, wt has stabilized.      Nutrition Diagnosis: Severe malnutrition related to Inability to consume sufficient energy as evidenced by physiological causes increasing nutrient needs., hypermetabolic state., muscle wasting. and fat loss.    Plan: Will follow up per oncology nutrition protocol

## 2023-05-19 ENCOUNTER — HOSPITAL ENCOUNTER (OUTPATIENT)
Dept: RADIATION ONCOLOGY | Facility: HOSPITAL | Age: 63
Discharge: HOME | End: 2023-05-19

## 2023-05-19 LAB
RAD ONC ARIA COURSE ID: NORMAL
RAD ONC ARIA COURSE INTENT: NORMAL
RAD ONC ARIA COURSE LAST TREATMENT DATE: NORMAL
RAD ONC ARIA COURSE START DATE: NORMAL
RAD ONC ARIA COURSE TREATMENT ELAPSED DAYS: 39
RAD ONC ARIA FIRST TREATMENT DATE: NORMAL
RAD ONC ARIA PLAN FRACTIONS TREATED TO DATE: 30
RAD ONC ARIA PLAN ID: NORMAL
RAD ONC ARIA PLAN PRESCRIBED DOSE PER FRACTION: 2 GY
RAD ONC ARIA PLAN PRIMARY REFERENCE POINT: NORMAL
RAD ONC ARIA PLAN TOTAL FRACTIONS PRESCRIBED: 35
RAD ONC ARIA PLAN TOTAL PRESCRIBED DOSE: 7000 CGY
RAD ONC ARIA REFERENCE POINT DOSAGE GIVEN TO DATE: 60 GY
RAD ONC ARIA REFERENCE POINT ID: NORMAL
RAD ONC ARIA REFERENCE POINT SESSION DOSAGE GIVEN: 2 GY

## 2023-05-19 PROCEDURE — 77014 CHG CT GUIDANCE RADIATION THERAPY FLDS PLACEMENT: CPT | Performed by: RADIOLOGY

## 2023-05-19 PROCEDURE — 77386: CPT | Performed by: RADIOLOGY

## 2023-05-19 PROCEDURE — 77336 RADIATION PHYSICS CONSULT: CPT | Performed by: RADIOLOGY

## 2023-05-22 ENCOUNTER — HOSPITAL ENCOUNTER (OUTPATIENT)
Dept: RADIATION ONCOLOGY | Facility: HOSPITAL | Age: 63
Discharge: HOME | End: 2023-05-22

## 2023-05-22 ENCOUNTER — HOSPITAL ENCOUNTER (OUTPATIENT)
Dept: ONCOLOGY | Facility: HOSPITAL | Age: 63
Discharge: HOME | End: 2023-05-22
Admitting: INTERNAL MEDICINE
Payer: COMMERCIAL

## 2023-05-22 ENCOUNTER — OFFICE VISIT (OUTPATIENT)
Dept: ONCOLOGY | Facility: HOSPITAL | Age: 63
End: 2023-05-22
Payer: COMMERCIAL

## 2023-05-22 VITALS
WEIGHT: 108.25 LBS | RESPIRATION RATE: 18 BRPM | DIASTOLIC BLOOD PRESSURE: 91 MMHG | TEMPERATURE: 99 F | BODY MASS INDEX: 19.32 KG/M2 | SYSTOLIC BLOOD PRESSURE: 142 MMHG | HEART RATE: 110 BPM | OXYGEN SATURATION: 97 %

## 2023-05-22 VITALS
OXYGEN SATURATION: 97 % | BODY MASS INDEX: 19.32 KG/M2 | TEMPERATURE: 99 F | HEART RATE: 110 BPM | RESPIRATION RATE: 20 BRPM | DIASTOLIC BLOOD PRESSURE: 91 MMHG | WEIGHT: 108.25 LBS | SYSTOLIC BLOOD PRESSURE: 142 MMHG

## 2023-05-22 DIAGNOSIS — D64.9 ANEMIA, UNSPECIFIED TYPE: ICD-10-CM

## 2023-05-22 DIAGNOSIS — C02.9 PRIMARY TONGUE SQUAMOUS CELL CARCINOMA: Primary | ICD-10-CM

## 2023-05-22 DIAGNOSIS — Z45.2 ENCOUNTER FOR ADJUSTMENT OR MANAGEMENT OF VASCULAR ACCESS DEVICE: ICD-10-CM

## 2023-05-22 LAB
ANION GAP SERPL CALCULATED.3IONS-SCNC: 13.5 MMOL/L (ref 5–15)
BASOPHILS # BLD AUTO: 0.02 10*3/MM3 (ref 0–0.2)
BASOPHILS NFR BLD AUTO: 0.8 % (ref 0–1.5)
BUN SERPL-MCNC: 17 MG/DL (ref 8–23)
BUN/CREAT SERPL: 27 (ref 7–25)
CALCIUM SPEC-SCNC: 9.8 MG/DL (ref 8.6–10.5)
CHLORIDE SERPL-SCNC: 96 MMOL/L (ref 98–107)
CO2 SERPL-SCNC: 24.5 MMOL/L (ref 22–29)
CREAT SERPL-MCNC: 0.63 MG/DL (ref 0.57–1)
DEPRECATED RDW RBC AUTO: 46.8 FL (ref 37–54)
EGFRCR SERPLBLD CKD-EPI 2021: 99.8 ML/MIN/1.73
EOSINOPHIL # BLD AUTO: 0.01 10*3/MM3 (ref 0–0.4)
EOSINOPHIL NFR BLD AUTO: 0.4 % (ref 0.3–6.2)
ERYTHROCYTE [DISTWIDTH] IN BLOOD BY AUTOMATED COUNT: 15.2 % (ref 12.3–15.4)
FERRITIN SERPL-MCNC: 710.7 NG/ML (ref 13–150)
GLUCOSE SERPL-MCNC: 132 MG/DL (ref 65–99)
HCT VFR BLD AUTO: 25.6 % (ref 34–46.6)
HGB BLD-MCNC: 8.8 G/DL (ref 12–15.9)
IMM GRANULOCYTES # BLD AUTO: 0.02 10*3/MM3 (ref 0–0.05)
IMM GRANULOCYTES NFR BLD AUTO: 0.8 % (ref 0–0.5)
IRON 24H UR-MRATE: 29 MCG/DL (ref 37–145)
IRON SATN MFR SERPL: 8 % (ref 20–50)
LYMPHOCYTES # BLD AUTO: 0.35 10*3/MM3 (ref 0.7–3.1)
LYMPHOCYTES NFR BLD AUTO: 13.9 % (ref 19.6–45.3)
MAGNESIUM SERPL-MCNC: 1.8 MG/DL (ref 1.6–2.4)
MCH RBC QN AUTO: 30.4 PG (ref 26.6–33)
MCHC RBC AUTO-ENTMCNC: 34.4 G/DL (ref 31.5–35.7)
MCV RBC AUTO: 88.6 FL (ref 79–97)
MONOCYTES # BLD AUTO: 0.59 10*3/MM3 (ref 0.1–0.9)
MONOCYTES NFR BLD AUTO: 23.4 % (ref 5–12)
NEUTROPHILS NFR BLD AUTO: 1.53 10*3/MM3 (ref 1.7–7)
NEUTROPHILS NFR BLD AUTO: 60.7 % (ref 42.7–76)
PLATELET # BLD AUTO: 626 10*3/MM3 (ref 140–450)
PMV BLD AUTO: 9.8 FL (ref 6–12)
POTASSIUM SERPL-SCNC: 3.9 MMOL/L (ref 3.5–5.2)
RAD ONC ARIA COURSE ID: NORMAL
RAD ONC ARIA COURSE INTENT: NORMAL
RAD ONC ARIA COURSE LAST TREATMENT DATE: NORMAL
RAD ONC ARIA COURSE START DATE: NORMAL
RAD ONC ARIA COURSE TREATMENT ELAPSED DAYS: 42
RAD ONC ARIA FIRST TREATMENT DATE: NORMAL
RAD ONC ARIA PLAN FRACTIONS TREATED TO DATE: 31
RAD ONC ARIA PLAN ID: NORMAL
RAD ONC ARIA PLAN PRESCRIBED DOSE PER FRACTION: 2 GY
RAD ONC ARIA PLAN PRIMARY REFERENCE POINT: NORMAL
RAD ONC ARIA PLAN TOTAL FRACTIONS PRESCRIBED: 35
RAD ONC ARIA PLAN TOTAL PRESCRIBED DOSE: 7000 CGY
RAD ONC ARIA REFERENCE POINT DOSAGE GIVEN TO DATE: 62 GY
RAD ONC ARIA REFERENCE POINT ID: NORMAL
RAD ONC ARIA REFERENCE POINT SESSION DOSAGE GIVEN: 2 GY
RBC # BLD AUTO: 2.89 10*6/MM3 (ref 3.77–5.28)
SODIUM SERPL-SCNC: 134 MMOL/L (ref 136–145)
TIBC SERPL-MCNC: 346 MCG/DL (ref 298–536)
TRANSFERRIN SERPL-MCNC: 232 MG/DL (ref 200–360)
WBC NRBC COR # BLD: 2.52 10*3/MM3 (ref 3.4–10.8)

## 2023-05-22 PROCEDURE — 25010000002 CISPLATIN PER 50 MG: Performed by: INTERNAL MEDICINE

## 2023-05-22 PROCEDURE — 25010000002 FOSAPREPITANT PER 1 MG: Performed by: INTERNAL MEDICINE

## 2023-05-22 PROCEDURE — 77014 CHG CT GUIDANCE RADIATION THERAPY FLDS PLACEMENT: CPT | Performed by: RADIOLOGY

## 2023-05-22 PROCEDURE — 96413 CHEMO IV INFUSION 1 HR: CPT

## 2023-05-22 PROCEDURE — 83540 ASSAY OF IRON: CPT | Performed by: INTERNAL MEDICINE

## 2023-05-22 PROCEDURE — 25010000002 HEPARIN LOCK FLUSH PER 10 UNITS: Performed by: INTERNAL MEDICINE

## 2023-05-22 PROCEDURE — 85025 COMPLETE CBC W/AUTO DIFF WBC: CPT | Performed by: INTERNAL MEDICINE

## 2023-05-22 PROCEDURE — 99214 OFFICE O/P EST MOD 30 MIN: CPT | Performed by: INTERNAL MEDICINE

## 2023-05-22 PROCEDURE — 96367 TX/PROPH/DG ADDL SEQ IV INF: CPT

## 2023-05-22 PROCEDURE — 25010000002 PALONOSETRON PER 25 MCG: Performed by: INTERNAL MEDICINE

## 2023-05-22 PROCEDURE — 84466 ASSAY OF TRANSFERRIN: CPT | Performed by: INTERNAL MEDICINE

## 2023-05-22 PROCEDURE — 25010000002 POTASSIUM CHLORIDE PER 2 MEQ OF POTASSIUM: Performed by: INTERNAL MEDICINE

## 2023-05-22 PROCEDURE — 25010000002 DEXAMETHASONE SODIUM PHOSPHATE 120 MG/30ML SOLUTION: Performed by: INTERNAL MEDICINE

## 2023-05-22 PROCEDURE — 80048 BASIC METABOLIC PNL TOTAL CA: CPT | Performed by: INTERNAL MEDICINE

## 2023-05-22 PROCEDURE — 25010000002 MAGNESIUM SULFATE PER 500 MG OF MAGNESIUM: Performed by: INTERNAL MEDICINE

## 2023-05-22 PROCEDURE — 96375 TX/PRO/DX INJ NEW DRUG ADDON: CPT

## 2023-05-22 PROCEDURE — 96366 THER/PROPH/DIAG IV INF ADDON: CPT

## 2023-05-22 PROCEDURE — 77386: CPT | Performed by: RADIOLOGY

## 2023-05-22 PROCEDURE — 82728 ASSAY OF FERRITIN: CPT | Performed by: INTERNAL MEDICINE

## 2023-05-22 PROCEDURE — 83735 ASSAY OF MAGNESIUM: CPT | Performed by: INTERNAL MEDICINE

## 2023-05-22 RX ORDER — SODIUM CHLORIDE 0.9 % (FLUSH) 0.9 %
20 SYRINGE (ML) INJECTION AS NEEDED
Status: DISCONTINUED | OUTPATIENT
Start: 2023-05-22 | End: 2023-05-23 | Stop reason: HOSPADM

## 2023-05-22 RX ORDER — HEPARIN SODIUM (PORCINE) LOCK FLUSH IV SOLN 100 UNIT/ML 100 UNIT/ML
500 SOLUTION INTRAVENOUS AS NEEDED
Status: DISCONTINUED | OUTPATIENT
Start: 2023-05-22 | End: 2023-05-23 | Stop reason: HOSPADM

## 2023-05-22 RX ORDER — SODIUM CHLORIDE 9 MG/ML
250 INJECTION, SOLUTION INTRAVENOUS ONCE
Status: COMPLETED | OUTPATIENT
Start: 2023-05-22 | End: 2023-05-22

## 2023-05-22 RX ORDER — OLANZAPINE 5 MG/1
5 TABLET ORAL ONCE
Status: CANCELLED | OUTPATIENT
Start: 2023-05-22 | End: 2023-05-22

## 2023-05-22 RX ORDER — PALONOSETRON 0.05 MG/ML
0.25 INJECTION, SOLUTION INTRAVENOUS ONCE
Status: COMPLETED | OUTPATIENT
Start: 2023-05-22 | End: 2023-05-22

## 2023-05-22 RX ORDER — SODIUM CHLORIDE 0.9 % (FLUSH) 0.9 %
20 SYRINGE (ML) INJECTION AS NEEDED
OUTPATIENT
Start: 2023-05-22

## 2023-05-22 RX ORDER — PALONOSETRON 0.05 MG/ML
0.25 INJECTION, SOLUTION INTRAVENOUS ONCE
Status: CANCELLED | OUTPATIENT
Start: 2023-05-22

## 2023-05-22 RX ORDER — HEPARIN SODIUM (PORCINE) LOCK FLUSH IV SOLN 100 UNIT/ML 100 UNIT/ML
500 SOLUTION INTRAVENOUS AS NEEDED
OUTPATIENT
Start: 2023-05-22

## 2023-05-22 RX ORDER — SODIUM CHLORIDE 9 MG/ML
250 INJECTION, SOLUTION INTRAVENOUS ONCE
Status: CANCELLED | OUTPATIENT
Start: 2023-05-22

## 2023-05-22 RX ORDER — OLANZAPINE 2.5 MG/1
5 TABLET ORAL ONCE
Status: COMPLETED | OUTPATIENT
Start: 2023-05-22 | End: 2023-05-22

## 2023-05-22 RX ADMIN — POTASSIUM CHLORIDE 500 ML: 2 INJECTION, SOLUTION, CONCENTRATE INTRAVENOUS at 09:30

## 2023-05-22 RX ADMIN — POTASSIUM CHLORIDE 500 ML: 2 INJECTION, SOLUTION, CONCENTRATE INTRAVENOUS at 12:24

## 2023-05-22 RX ADMIN — PALONOSETRON 0.25 MG: 0.05 INJECTION, SOLUTION INTRAVENOUS at 10:36

## 2023-05-22 RX ADMIN — DEXAMETHASONE SODIUM PHOSPHATE 12 MG: 4 INJECTION, SOLUTION INTRA-ARTICULAR; INTRALESIONAL; INTRAMUSCULAR; INTRAVENOUS; SOFT TISSUE at 10:37

## 2023-05-22 RX ADMIN — Medication 20 ML: at 13:29

## 2023-05-22 RX ADMIN — SODIUM CHLORIDE 250 ML: 9 INJECTION, SOLUTION INTRAVENOUS at 09:28

## 2023-05-22 RX ADMIN — CISPLATIN 60 MG: 1 INJECTION, SOLUTION INTRAVENOUS at 11:23

## 2023-05-22 RX ADMIN — HEPARIN SODIUM (PORCINE) LOCK FLUSH IV SOLN 100 UNIT/ML 500 UNITS: 100 SOLUTION at 13:29

## 2023-05-22 RX ADMIN — FOSAPREPITANT 100 ML: 150 INJECTION, POWDER, LYOPHILIZED, FOR SOLUTION INTRAVENOUS at 10:48

## 2023-05-22 RX ADMIN — OLANZAPINE 5 MG: 2.5 TABLET, FILM COATED ORAL at 10:03

## 2023-05-22 NOTE — PROGRESS NOTES
Outpatient Nutrition Oncology Follow Up    Patient Name: Carina Serna  YOB: 1960  MRN: [id number]    Recommendation(s):     At this time, pt needs a minimum of 5.5 cans daily of Osmolite 1.5 + additional 610 mL FWF daily.    Recommend to change to peptide-based formula and / or a plant-based formula for better tolerance.    (For Impact Peptide 1.5, needs 5.5 cans/day; For AskBot Peptide 1.5, needs 4 total cans/day).    Continue recreational feeds as tolerated (per SLP's recommendations:  Liquid-puree consistency).      Reason for Consult:  EN F/U       Today's Weight:  49.1 kg    Weight Change:   2% wt decline in 1 week  7% wt decline / 1 month  14% wt decline / 3 months    Estimated nutritional needs (daily):  1964 kcals, 98 gm protein, & 4150-2141 ml fluid    Nutrition-related concerns: Anorexia, Early Satiety and Dysphagia/odynophagia, mouth soreness, mild nausea over the weekend    Current PO intake:  Minimal (clear liquids)    Current EN RX:  Osmolite 1.5, bolus feeds of 3 cans/day + 120 mL h20 flush with each feeding.    (Current EN provides 1068 kcals & 45 gm protein; meeting only 40-50% of nutritional needs).    Consult or Intervention:  Current EN inadequate as noted above.  Pt still giving bolus feeds, per preference, and unable to advance past 3 total cans/day of the Osmolite 1.5.  Pt has been unable to tolerate / advance to 4 cans per day during her course of treatment.  PO intake continues to provide minimal nutrition.    Today, suggested to pt:  a trial of peptide-based formula (such as Impact Peptide 1.5 or AskBot Peptide 1.5), however pt declined for now.  Suggested to try gravity feeds vs syringe, but pt also declined for now.    Nutrition Diagnosis: Severe malnutrition related to Inability to consume sufficient energy as evidenced by physiological causes increasing nutrient needs., hypermetabolic state., muscle wasting., fat loss., inadequate energy intake., patient  report. and inadequate EN, 2% wt decline in 1 week / 7% decline 1 month.    Plan: Will follow up per oncology nutrition protocol

## 2023-05-22 NOTE — PROGRESS NOTES
Chief Complaint  Primary tongue squamous cell carcinoma    Edilma Peraza, GRACY Peraza, Edilma, GRACY    Subjective          Carina Avelina Serna presents to [facility] for base of tongue cancer.    Ms. Serna Is a very pleasant 63-year-old female who presents for annual medical oncology evaluation is due to new diagnosis of head and neck cancer at base of tongue.  She has a benign past medical history.  She has quit smoking.  She has been evaluated by Dr. Weiner with radiation oncology and has been started on chemotherapy and radiation, about to finish. She is due to finish radiation this week. She got C4 chemo with weekly Cisplatin last Monday and tolerated it well. She had some fatigue mid way through but otherwise did well. She did have some nausea over the weekend. She has a mouth sore but is not able to tolerate magic mouthwash. She also has some thrush. She is using viscous lidocaine for numbing of her throat and this is working well for her.     Oncology/Hematology History Overview Note   1/19/23: Referred to Dr. Escobar for evaluation after several months of right sided sore throat. She underwent flexible fiberoptic laryngoscopy revealing a mass at the right lateral base of tongue and glossotonsillar sulcus.  Pathology from biopsy revealed HPV negative, moderately differentiated squamous cell carcinoma.      1/25/2023: CT scan of the soft tissue neck demonstrated a 2.7 x 2.3 cm abnormal soft tissue density likely representing neoplasm centered at the lateral right oropharynx with extension toward the lingual tonsil.  There is a moderately suspicious rounded right level 2 lymph node measuring 0.7 cm in the short axis.  CT scan of the chest revealed a subcarinal lymph node at the upper limit of normal for size.      3/6/23: NM PET: 1. Intense abnormal radiopharmaceutical accumulation corresponding to the soft tissue mass at the   right aspect of the oropharynx  consistent with malignancy.  2. Mildly increased radiopharmaceutical accumulation corresponding to lymph nodes along the   cervical/jugular chain bilaterally indicating developing malignant metastatic lymphadenopathy.    3. No evidence for additional malignancy or more distal metastatic disease.    4. Mild to moderate changes of emphysema/COPD are noted.  5. Coronary artery calcifications are observed.  6. Areas of ill-defined ground-glass density in the right upper lobe.  These findings may relate to   areas of scarring.  Developing infiltrates in the setting of right upper lobe pneumonia could be   considered.  Recommend correlation for signs or symptoms of acute infection.  Also recommend   follow-up CT of the chest in 6 months to document stability versus resolution.    4/10/23: C1D1 Cisplatin weekly. First dose of radiation.     4/17/23: C2 chemo, switched to Carbo/taxol due to shortage of Cisplatin. Comlicated by reaction to Taxol. She did receive Carboplatin    4/23/23: C3 chemo, switched to Carboplatin/infusional 5-FU    5/15/23: C4 chemo, switched back to weekly Cisplatin    5/22/23: C5 chemo, weekly Cisplatin. Last dose of chemo.        Primary tongue squamous cell carcinoma   1/19/2023 Initial Diagnosis    Primary tongue squamous cell carcinoma (HCC)     4/10/2023 - 4/10/2023 Chemotherapy    OP HEAD & NECK CISplatin (Weekly) + XRT     4/24/2023 - 4/28/2023 Chemotherapy    OP HEAD & NECK CARBOplatin / Fluorouracil CIV + XRT     5/15/2023 -  Chemotherapy    OP HEAD & NECK CISplatin (Weekly) + XRT     Primary squamous cell carcinoma of base of tongue   2/24/2023 Initial Diagnosis    Primary squamous cell carcinoma of base of tongue     4/17/2023 - 4/17/2023 Biopsy    OP HEAD & NECK PACLitaxel 50 mg/m2 / CARBOplatin AUC=2 (weekly) + XRT  Plan Provider: Alexa Connelly MD PhD  Treatment goal: Curative  Line of treatment: [No plan line of treatment]     5/10/2023 -  Chemotherapy    OP HYDRATION AND  ANTIEMETICS     Malignant neoplasm of base of tongue (Resolved)   3/30/2023 Initial Diagnosis    Malignant neoplasm of base of tongue (HCC)     3/30/2023 -  Radiation    RADIATION THERAPY Treatment Details (Noted on 3/30/2023)  Site: Head and Neck  Technique: IMRT  Goal: No goal specified  Planned Treatment Start Date: No planned start date specified         Review of Systems   Constitutional: Negative for fatigue.   HENT: Positive for sore throat.    All other systems reviewed and are negative.    Current Outpatient Medications on File Prior to Visit   Medication Sig Dispense Refill   • aluminum-magnesium hydroxide-simethicone (Maalox Max) 400-400-40 MG/5ML suspension Take 10 mL by mouth Every 6 (Six) Hours As Needed for Indigestion or Heartburn. 355 mL 0   • chlorhexidine (PERIDEX) 0.12 % solution      • diphenhydrAMINE 12.5 MG/5ML elixir 20 mL, aluminum-magnesium hydroxide-simethicone 400-400-40 MG/5ML suspension 20 mL, Lidocaine Viscous HCl 2 % solution 20 mL Swish and spit 15 mL Every 4 (Four) Hours As Needed for Mucositis. 300 mL 2   • FLUoxetine (PROzac) 10 MG tablet Take 1 tablet by mouth Daily.     • HYDROcodone-acetaminophen (NORCO) 7.5-325 MG per tablet Take 1 tablet by mouth Every 6 (Six) Hours As Needed for Moderate Pain. 60 tablet 0   • ibuprofen (ADVIL,MOTRIN) 600 MG tablet TAKE 1 TABLET BY MOUTH EVERY 6 HOURS. NOT TO EXCEED 3200 MG DAILY     • Lidocaine Viscous HCl (XYLOCAINE) 2 % solution Take 2 mL by mouth As Needed for Mild Pain. Mix with Maalox every 6 hours 100 mL 0   • losartan (COZAAR) 25 MG tablet Take 1 tablet by mouth Daily.     • Nystatin-Diphenhydramine-Hydrocortisone-Lidocaine Swish and spit 5 mL Every 3 (Three) Hours As Needed. 300 mL 1   • OLANZapine (zyPREXA) 5 MG tablet Take 1 tablet by mouth Every Night. Take on days 2, 3 and 4 after chemotherapy. 3 tablet 5   • ondansetron (ZOFRAN) 8 MG tablet Take 1 tablet by mouth Every 8 (Eight) Hours As Needed for Nausea or Vomiting. 30 tablet  2   • prochlorperazine (COMPAZINE) 10 MG tablet Take 1 tablet by mouth Every 6 (Six) Hours As Needed for Nausea or Vomiting. 30 tablet 1     No current facility-administered medications on file prior to visit.       Allergies   Allergen Reactions   • Paclitaxel Anaphylaxis     Past Medical History:   Diagnosis Date   • Dental disease    • PONV (postoperative nausea and vomiting)    • Primary squamous cell carcinoma of base of tongue     NEWLY DIAGNOSED AND NO TYPE OF TREATMENT DONE THUS FAR 23   • Sore throat      Past Surgical History:   Procedure Laterality Date   • BLADDER SURGERY     • DIRECT LARYNGOSCOPY, ESOPHAGOSCOPY, BRONCHOSCOPY N/A 02/10/2023    Procedure: DIRECT LARYNGOSCOPY, ESOPHAGOSCOPY, BRONCHOSCOPY;  Surgeon: Migel Escobar MD;  Location: [facility];  Service: ENT;  Laterality: N/A;   • EYE SURGERY      CATARACT REMOVED BILATERALLY   • HYSTERECTOMY     • PEG TUBE INSERTION N/A 2023    Procedure: PERCUTANEOUS ENDOSCOPIC GASTROSTOMY TUBE INSERTION;  Surgeon: Frank Ellington MD;  Location: [facility] OR;  Service: General;  Laterality: N/A;   • TONSILLECTOMY     • VENOUS ACCESS DEVICE (PORT) INSERTION N/A 2023    Procedure: INSERTION OF PORTACATH ,insertion of percutaneoous endoscopic gastrostomy tube;  Surgeon: Frank Ellington MD;  Location: [facility] OR;  Service: General;  Laterality: N/A;     Social History     Socioeconomic History   • Marital status:    Tobacco Use   • Smoking status: Former     Packs/day: 1.00     Years: 30.00     Pack years: 30.00     Types: Cigarettes     Quit date: 2023     Years since quittin.3   • Smokeless tobacco: Never   Vaping Use   • Vaping Use: Never used   Substance and Sexual Activity   • Alcohol use: Not Currently   • Drug use: Never   • Sexual activity: Defer     Family History   Problem Relation Age of Onset   • Cancer Mother         Colon cancer   • Heart attack Father    • Colon cancer Maternal Aunt    • Arturo  Hyperthermia Neg Hx        Objective   Physical Exam    Well appearing patient in no acute distress on RA  Anicteric sclerae, + erythema on neck  + thrush on tongue, Right tympanic membrane normal.   Respirations non-labored  Awake, alert, and oriented x 4. Speech intact. No gross neurologic deficit  Appropriate mood and affect      ECOG 0.    Vitals:    05/22/23 0831   BP: 142/91   Pulse: 110   Resp: 18   Temp: 99 °F (37.2 °C)   TempSrc: Temporal   SpO2: 97%   Weight: 49.1 kg (108 lb 3.9 oz)   PainSc:   4   PainLoc: Throat               PHQ-9 Total Score:         The patient is not  experiencing fatigue.   PT/OT Functional Screening: PT fx screen: No needs identified  Speech Functional Screening: Speech fx screen: No needs identified  Rehab to be ordered: Rehab to be ordered: No needs identified      Result Review :   The following data was reviewed by: Sarmad Cohen MD on 05/22/23:  Lab Results   Component Value Date    HGB 8.8 (L) 05/22/2023    HCT 25.6 (L) 05/22/2023    MCV 88.6 05/22/2023     (H) 05/22/2023    WBC 2.52 (L) 05/22/2023    NEUTROABS 1.53 (L) 05/22/2023    LYMPHSABS 0.35 (L) 05/22/2023    MONOSABS 0.59 05/22/2023    EOSABS 0.01 05/22/2023    BASOSABS 0.02 05/22/2023     Lab Results   Component Value Date    GLUCOSE 121 (H) 05/15/2023    BUN 14 05/15/2023    CREATININE 0.70 05/15/2023     (L) 05/15/2023    K 4.0 05/15/2023    CL 98 05/15/2023    CO2 25.6 05/15/2023    CALCIUM 9.8 05/15/2023    PROTEINTOT 7.1 05/15/2023    ALBUMIN 4.2 05/15/2023    BILITOT 0.2 05/15/2023    ALKPHOS 107 05/15/2023    AST 13 05/15/2023    ALT 12 05/15/2023     Lab Results   Component Value Date    MG 1.8 05/15/2023     Labs personally reviewed. Anemia present. Now with thrombocytosis.  Creatinine normal.       No radiology results for the last 30 days.        Assessment and Plan    Diagnoses and all orders for this visit:    1. Primary tongue squamous cell carcinoma (Primary)    Other orders  -      sodium chloride 0.9 % infusion 250 mL  -     magnesium sulfate 500 mg, potassium chloride 10 mEq in sodium chloride 0.9 % 500 mL IVPB  -     OLANZapine (zyPREXA) tablet 5 mg  -     palonosetron (ALOXI) injection 0.25 mg  -     fosaprepitant (EMEND) 150 mg in sodium chloride 0.9 % 100 mL IVPB  -     dexamethasone (DECADRON) 12 mg in sodium chloride 0.9 % IVPB  -     CISplatin (PLATINOL) 60 mg in sodium chloride 0.9 % 310 mL chemo IVPB  -     magnesium sulfate 500 mg, potassium chloride 10 mEq in sodium chloride 0.9 % 500 mL IVPB    Ms. Serna has biopsy proven HPV negative squamous cell carcinoma of right base of tongue.  Per CT imaging with contrast that has been obtained she has possible right-sided level 2 lymph node measuring 0.7 cm.  3/6/23 PET scan confirmed FDG avidity of lymph nodes involved.  Patient has had plate placed in jaw so that she is able to start treatment.  Concurrent chemoradiation with curative intent. Treatment started on 4/10/23.     C1 Cisplatin 4/10/23. First dose of radiation this day as well. C2 chemo Carbo/Taxol (Switched due to Cisplatin shortage) 4/17/23. Complicated by reaction to Taxol. Carboplatin received. Will avoid further Taxol. C3 chemo with Carbo/96h infusional 5-FU 4/23/23.C4 switched back to weekly Cisplatin, given 5/15/23.      C5 chemo weekly Cisplatin 5/22/23, which will be last dose of chemotherapy. Labs appropriate to proceed.  Anti-emetics provided for PRN use. D2-4 olanzapine ordered.     XRT to be completed this week.     Will get follow up CT scans 6 weeks after completion. Due mid July. Repeat PET 6 weeks after that.        Anemia secondary to chemotherapy  Iron levels to be obtained today. No dose adjustment needed. Continue to monitor.     Thrombocytosis  Could be reactive to chemo/radiation. Will rule out iron deficiency with ferritin and iron profile.     This is an acute or chronic illness that poses a threat to life or bodily function. The above treatment plan  involves a high risk of complications and/or mortality of patient management. Continue with labs to monitor for severe toxicities.     I spent 20 minutes caring for Carina on this date of service. This time includes time spent by me in the following activities:preparing for the visit, reviewing tests, obtaining and/or reviewing a separately obtained history, performing a medically appropriate examination and/or evaluation , counseling and educating the patient/family/caregiver, ordering medications, tests, or procedures, referring and communicating with other health care professionals , documenting information in the medical record, independently interpreting results and communicating that information with the patient/family/caregiver and care coordination    Patient Follow Up: 6 weeks with CT imaging.   Patient was given instructions and counseling regarding her condition or for health maintenance advice. Please see specific information pulled into the AVS if appropriate.

## 2023-05-23 ENCOUNTER — HOSPITAL ENCOUNTER (OUTPATIENT)
Dept: RADIATION ONCOLOGY | Facility: HOSPITAL | Age: 63
Discharge: HOME | End: 2023-05-23

## 2023-05-23 VITALS
HEART RATE: 88 BPM | BODY MASS INDEX: 19.95 KG/M2 | DIASTOLIC BLOOD PRESSURE: 78 MMHG | TEMPERATURE: 97.5 F | RESPIRATION RATE: 16 BRPM | SYSTOLIC BLOOD PRESSURE: 163 MMHG | WEIGHT: 111.77 LBS | OXYGEN SATURATION: 99 %

## 2023-05-23 DIAGNOSIS — C01 PRIMARY SQUAMOUS CELL CARCINOMA OF BASE OF TONGUE: Primary | ICD-10-CM

## 2023-05-23 LAB
RAD ONC ARIA COURSE ID: NORMAL
RAD ONC ARIA COURSE INTENT: NORMAL
RAD ONC ARIA COURSE LAST TREATMENT DATE: NORMAL
RAD ONC ARIA COURSE START DATE: NORMAL
RAD ONC ARIA COURSE TREATMENT ELAPSED DAYS: 43
RAD ONC ARIA FIRST TREATMENT DATE: NORMAL
RAD ONC ARIA PLAN FRACTIONS TREATED TO DATE: 32
RAD ONC ARIA PLAN ID: NORMAL
RAD ONC ARIA PLAN PRESCRIBED DOSE PER FRACTION: 2 GY
RAD ONC ARIA PLAN PRIMARY REFERENCE POINT: NORMAL
RAD ONC ARIA PLAN TOTAL FRACTIONS PRESCRIBED: 35
RAD ONC ARIA PLAN TOTAL PRESCRIBED DOSE: 7000 CGY
RAD ONC ARIA REFERENCE POINT DOSAGE GIVEN TO DATE: 64 GY
RAD ONC ARIA REFERENCE POINT ID: NORMAL
RAD ONC ARIA REFERENCE POINT SESSION DOSAGE GIVEN: 2 GY

## 2023-05-23 PROCEDURE — 77014 CHG CT GUIDANCE RADIATION THERAPY FLDS PLACEMENT: CPT | Performed by: RADIOLOGY

## 2023-05-23 PROCEDURE — 77427 RADIATION TX MANAGEMENT X5: CPT | Performed by: RADIOLOGY

## 2023-05-23 PROCEDURE — 77386: CPT | Performed by: RADIOLOGY

## 2023-05-23 RX ORDER — FLUCONAZOLE 100 MG/1
100 TABLET ORAL DAILY
Qty: 7 TABLET | Refills: 0 | Status: SHIPPED | OUTPATIENT
Start: 2023-05-23

## 2023-05-23 NOTE — PROGRESS NOTES
On Treatment Visit       Patient: Carina Serna   YOB: 1960   Medical Record Number: [id number]     Date of Visit  May 23, 2023   Primary Diagnosis:Primary squamous cell carcinoma of base of tongue [C01]         was seen today for an on treatment visit.  She is receiving radiation therapy to the head and neck. She  has received 6400 cGy in 32 fractions out of a planned dose of 7000 cGy in 35 fractions. She is currently receiving concurrent cisplatin chemotherapy per Dr. Cohen.     Sore mouth and sore throat.  Taking nystatin oral solution.  Maintaining nutrition with 3 cans/day.  Still caring for skin.                                         Review of Systems:   Review of Systems   Constitutional: Positive for appetite change and fatigue (3/10).   HENT: Positive for ear pain (right ear pain), sore throat and trouble swallowing. Negative for tinnitus.         Thrush present     Respiratory: Negative for cough and shortness of breath.    Gastrointestinal: Negative for constipation, diarrhea, nausea and vomiting.   Genitourinary: Negative for difficulty urinating, dysuria, frequency and urgency.   Skin: Positive for color change (moderate-severe erythema ).   Neurological: Negative for dizziness and headaches.   Psychiatric/Behavioral: Positive for sleep disturbance (difficulty falling and staying asleep).       Vitals:     Vitals:    05/23/23 1002   BP: 163/78   Pulse: 88   Resp: 16   Temp: 97.5 °F (36.4 °C)   SpO2: 99%       Weight:   Wt Readings from Last 3 Encounters:   05/23/23 50.7 kg (111 lb 12.4 oz)   05/22/23 49.1 kg (108 lb 3.9 oz)   05/22/23 49.1 kg (108 lb 3.9 oz)      Pain:    Pain Score    05/23/23 1002   PainSc:   5         Physical Exam:  Gen: WD/WN; NAD  HEENT: Thrush at retromolar trigone's bilaterally; thick thrush on oral tongue  Trachea: midline  Chest: symmetric  Resp: normal respiratory effort  Extr: warm, well-perfused  Neuro: awake and alert; no aphasia or  neglect    Plan: I have reviewed treatment setup notes, checked and approved the daily guidance images.  I reviewed dose delivery, treatment parameters and deemed them appropriate. We plan to continue radiation therapy as prescribed.    We will prescribe fluconazole; follow-up 1 week after completion of radiotherapy      Radiation Oncology    Electronically signed by Nikolai Weiner MD 5/23/2023  11:30 EDT

## 2023-05-24 ENCOUNTER — HOSPITAL ENCOUNTER (OUTPATIENT)
Dept: RADIATION ONCOLOGY | Facility: HOSPITAL | Age: 63
Discharge: HOME | End: 2023-05-24

## 2023-05-24 LAB
RAD ONC ARIA COURSE ID: NORMAL
RAD ONC ARIA COURSE INTENT: NORMAL
RAD ONC ARIA COURSE LAST TREATMENT DATE: NORMAL
RAD ONC ARIA COURSE START DATE: NORMAL
RAD ONC ARIA COURSE TREATMENT ELAPSED DAYS: 44
RAD ONC ARIA FIRST TREATMENT DATE: NORMAL
RAD ONC ARIA PLAN FRACTIONS TREATED TO DATE: 33
RAD ONC ARIA PLAN ID: NORMAL
RAD ONC ARIA PLAN PRESCRIBED DOSE PER FRACTION: 2 GY
RAD ONC ARIA PLAN PRIMARY REFERENCE POINT: NORMAL
RAD ONC ARIA PLAN TOTAL FRACTIONS PRESCRIBED: 35
RAD ONC ARIA PLAN TOTAL PRESCRIBED DOSE: 7000 CGY
RAD ONC ARIA REFERENCE POINT DOSAGE GIVEN TO DATE: 66 GY
RAD ONC ARIA REFERENCE POINT ID: NORMAL
RAD ONC ARIA REFERENCE POINT SESSION DOSAGE GIVEN: 2 GY

## 2023-05-24 PROCEDURE — 77014 CHG CT GUIDANCE RADIATION THERAPY FLDS PLACEMENT: CPT | Performed by: RADIOLOGY

## 2023-05-24 PROCEDURE — 77386: CPT | Performed by: RADIOLOGY

## 2023-05-25 ENCOUNTER — HOSPITAL ENCOUNTER (OUTPATIENT)
Dept: RADIATION ONCOLOGY | Facility: HOSPITAL | Age: 63
Discharge: HOME | End: 2023-05-25

## 2023-05-25 LAB
RAD ONC ARIA COURSE ID: NORMAL
RAD ONC ARIA COURSE INTENT: NORMAL
RAD ONC ARIA COURSE LAST TREATMENT DATE: NORMAL
RAD ONC ARIA COURSE START DATE: NORMAL
RAD ONC ARIA COURSE TREATMENT ELAPSED DAYS: 45
RAD ONC ARIA FIRST TREATMENT DATE: NORMAL
RAD ONC ARIA PLAN FRACTIONS TREATED TO DATE: 34
RAD ONC ARIA PLAN ID: NORMAL
RAD ONC ARIA PLAN PRESCRIBED DOSE PER FRACTION: 2 GY
RAD ONC ARIA PLAN PRIMARY REFERENCE POINT: NORMAL
RAD ONC ARIA PLAN TOTAL FRACTIONS PRESCRIBED: 35
RAD ONC ARIA PLAN TOTAL PRESCRIBED DOSE: 7000 CGY
RAD ONC ARIA REFERENCE POINT DOSAGE GIVEN TO DATE: 68 GY
RAD ONC ARIA REFERENCE POINT ID: NORMAL
RAD ONC ARIA REFERENCE POINT SESSION DOSAGE GIVEN: 2 GY

## 2023-05-25 PROCEDURE — 77386: CPT | Performed by: RADIOLOGY

## 2023-05-25 PROCEDURE — 77014 CHG CT GUIDANCE RADIATION THERAPY FLDS PLACEMENT: CPT | Performed by: RADIOLOGY

## 2023-05-26 ENCOUNTER — DOCUMENTATION (OUTPATIENT)
Dept: NUTRITION | Facility: HOSPITAL | Age: 63
End: 2023-05-26
Payer: COMMERCIAL

## 2023-05-26 VITALS — BODY MASS INDEX: 19.4 KG/M2 | WEIGHT: 108.69 LBS

## 2023-05-26 VITALS — WEIGHT: 108.69 LBS | BODY MASS INDEX: 19.4 KG/M2

## 2023-05-26 PROCEDURE — 77336 RADIATION PHYSICS CONSULT: CPT | Performed by: RADIOLOGY

## 2023-05-26 PROCEDURE — 77386: CPT | Performed by: RADIOLOGY

## 2023-05-26 NOTE — PROGRESS NOTES
Outpatient Nutrition Oncology Follow Up    Patient Name: Carina Serna  YOB: 1960  MRN: [id number]    Recommendation(s):   Trial Peptide based EN formula as listed below:    [employer] Peptide 1.5 bolus 4 cans/day   OR  Impact Peptide 1.5 bolus 5.5 cans/day    Continue recreational feeds as tolerated (per SLP's recommendations: Liquid puree consistency)     Reason for Consult: EN F/U  Dx: Primary tongue squamous cell carcinoma   Cancer Tx: Cisplatin, XRT to Head and Neck    Today's Weight: 49.3 kg      05/26/23  0900   Weight: 49.3 kg (108 lb 11 oz)       Weight Change:   0.2kg desired weight gain since last RD assessment (4 days)  Weight loss of 7% X 1 month (significant)   Weight loss of 14.3% X 3 months (significant)      Nutrition-related concerns: Nausea, Anorexia, Early Satiety, Dysphagia/odynophagia and Other: thrush / mouth soreness    Current PO intake: Minimal intake - mainly clear liquids    Current EN RX:   Osmolite 1.5, bolus feeds 3.5 cans/day + 120ml h20 flush with each feeding    Consult or Intervention:   Spoke with pt during last radiation treatment. Pt reports an increase in nausea s/p Cisplatin infusion this week. Pt continues to struggle to infuse goal EN which has resulted in significant UWL. Pt is amendable to trying Peptide based EN formula. Explained amount of each formula to try (goal rates - 4 cans of KF peptide 1.5 and 5.5 cans of Impact Peptide 1.5). Provided samples to patient as well as oncology RD contact number. Instructed pt to call oncology RD if either of the new EN formulas is better tolerated and explained this is a crucial step so the EN order can be changed and the referral can be sent in to vendor. Pt v/u and agreed. Provided pt with oncology RD contact number.     Nutrition Focused Physical Findings:           Nutrition Diagnosis: Severe malnutrition related to Inability to consume sufficient energy as evidenced by physiological causes increasing  nutrient needs., hypermetabolic state., muscle wasting., fat loss. and inadequate EN infusion d/t GI sxs, and weight loss. .    Plan: Will follow up per oncology nutrition protocol    Electronically signed by:  Dennise Stone RD  05/26/23 09:52 EDT

## 2023-05-28 LAB
RAD ONC ARIA COURSE ID: NORMAL
RAD ONC ARIA COURSE INTENT: NORMAL
RAD ONC ARIA COURSE LAST TREATMENT DATE: NORMAL
RAD ONC ARIA COURSE START DATE: NORMAL
RAD ONC ARIA COURSE TREATMENT ELAPSED DAYS: 46
RAD ONC ARIA FIRST TREATMENT DATE: NORMAL
RAD ONC ARIA PLAN FRACTIONS TREATED TO DATE: 35
RAD ONC ARIA PLAN ID: NORMAL
RAD ONC ARIA PLAN PRESCRIBED DOSE PER FRACTION: 2 GY
RAD ONC ARIA PLAN PRIMARY REFERENCE POINT: NORMAL
RAD ONC ARIA PLAN TOTAL FRACTIONS PRESCRIBED: 35
RAD ONC ARIA PLAN TOTAL PRESCRIBED DOSE: 7000 CGY
RAD ONC ARIA REFERENCE POINT DOSAGE GIVEN TO DATE: 70 GY
RAD ONC ARIA REFERENCE POINT ID: NORMAL
RAD ONC ARIA REFERENCE POINT SESSION DOSAGE GIVEN: 2 GY

## 2023-05-30 ENCOUNTER — TELEPHONE (OUTPATIENT)
Dept: ONCOLOGY | Facility: HOSPITAL | Age: 63
End: 2023-05-30

## 2023-05-30 DIAGNOSIS — R63.8 DEHYDRATION SYMPTOMS: Primary | ICD-10-CM

## 2023-05-30 NOTE — TELEPHONE ENCOUNTER
Spoke to patient and advised that we will try to get her scheduled for fluids today or tomorrow at the hospital. We do not have any available chairs to do it at our facility for today or tomrrow. Patient verbalized understanding.

## 2023-05-30 NOTE — TELEPHONE ENCOUNTER
The pt called and requested IV fluids. The pt states that she had tx on Monday last week. The pt states that she had N/V/D starting on Friday and through the weekend. The pt states that she lost 2 pounds over the weekend. The pt states that Dr Cohen told her at her lat visit that if she needs fluids to call. The pt states that she is feeling dehydrated and tired and ask to be scheduled for IV fluids.

## 2023-05-31 ENCOUNTER — HOSPITAL ENCOUNTER (OUTPATIENT)
Dept: ONCOLOGY | Facility: HOSPITAL | Age: 63
Discharge: HOME | End: 2023-05-31
Admitting: INTERNAL MEDICINE

## 2023-05-31 VITALS
SYSTOLIC BLOOD PRESSURE: 132 MMHG | RESPIRATION RATE: 18 BRPM | TEMPERATURE: 97 F | DIASTOLIC BLOOD PRESSURE: 81 MMHG | HEART RATE: 92 BPM | WEIGHT: 105.6 LBS | BODY MASS INDEX: 18.85 KG/M2 | OXYGEN SATURATION: 98 %

## 2023-05-31 DIAGNOSIS — T45.1X5A CHEMOTHERAPY-INDUCED NAUSEA: ICD-10-CM

## 2023-05-31 DIAGNOSIS — R11.0 CHEMOTHERAPY-INDUCED NAUSEA: ICD-10-CM

## 2023-05-31 DIAGNOSIS — R63.8 DEHYDRATION SYMPTOMS: ICD-10-CM

## 2023-05-31 DIAGNOSIS — Z45.2 ENCOUNTER FOR ADJUSTMENT OR MANAGEMENT OF VASCULAR ACCESS DEVICE: Primary | ICD-10-CM

## 2023-05-31 PROCEDURE — 96361 HYDRATE IV INFUSION ADD-ON: CPT

## 2023-05-31 PROCEDURE — 96374 THER/PROPH/DIAG INJ IV PUSH: CPT

## 2023-05-31 PROCEDURE — 96375 TX/PRO/DX INJ NEW DRUG ADDON: CPT

## 2023-05-31 PROCEDURE — 25010000002 HEPARIN LOCK FLUSH PER 10 UNITS: Performed by: INTERNAL MEDICINE

## 2023-05-31 PROCEDURE — 25010000002 ONDANSETRON PER 1 MG: Performed by: INTERNAL MEDICINE

## 2023-05-31 PROCEDURE — 96360 HYDRATION IV INFUSION INIT: CPT

## 2023-05-31 RX ORDER — SODIUM CHLORIDE 0.9 % (FLUSH) 0.9 %
20 SYRINGE (ML) INJECTION AS NEEDED
Status: CANCELLED | OUTPATIENT
Start: 2023-05-31

## 2023-05-31 RX ORDER — HEPARIN SODIUM (PORCINE) LOCK FLUSH IV SOLN 100 UNIT/ML 100 UNIT/ML
500 SOLUTION INTRAVENOUS AS NEEDED
Status: DISCONTINUED | OUTPATIENT
Start: 2023-05-31 | End: 2023-06-01 | Stop reason: HOSPADM

## 2023-05-31 RX ORDER — SODIUM CHLORIDE 0.9 % (FLUSH) 0.9 %
20 SYRINGE (ML) INJECTION AS NEEDED
Status: DISCONTINUED | OUTPATIENT
Start: 2023-05-31 | End: 2023-06-01 | Stop reason: HOSPADM

## 2023-05-31 RX ORDER — ONDANSETRON HCL IN 0.9 % NACL 8 MG/50 ML
8 INTRAVENOUS SOLUTION, PIGGYBACK (ML) INTRAVENOUS ONCE
Status: COMPLETED | OUTPATIENT
Start: 2023-05-31 | End: 2023-05-31

## 2023-05-31 RX ORDER — HEPARIN SODIUM (PORCINE) LOCK FLUSH IV SOLN 100 UNIT/ML 100 UNIT/ML
500 SOLUTION INTRAVENOUS AS NEEDED
Status: CANCELLED | OUTPATIENT
Start: 2023-05-31

## 2023-05-31 RX ORDER — ONDANSETRON HCL IN 0.9 % NACL 8 MG/50 ML
8 INTRAVENOUS SOLUTION, PIGGYBACK (ML) INTRAVENOUS ONCE
Status: CANCELLED | OUTPATIENT
Start: 2023-05-31

## 2023-05-31 RX ADMIN — SODIUM CHLORIDE 1000 ML: 9 INJECTION, SOLUTION INTRAVENOUS at 10:00

## 2023-05-31 RX ADMIN — Medication 20 ML: at 11:49

## 2023-05-31 RX ADMIN — ONDANSETRON 8 MG: 2 INJECTION INTRAMUSCULAR; INTRAVENOUS at 11:28

## 2023-05-31 RX ADMIN — HEPARIN SODIUM (PORCINE) LOCK FLUSH IV SOLN 100 UNIT/ML 500 UNITS: 100 SOLUTION at 11:49

## 2023-06-01 ENCOUNTER — HOSPITAL ENCOUNTER (OUTPATIENT)
Dept: INFUSION THERAPY | Facility: HOSPITAL | Age: 63
Discharge: HOME | End: 2023-06-01
Payer: COMMERCIAL

## 2023-06-01 DIAGNOSIS — R63.8 DEHYDRATION SYMPTOMS: ICD-10-CM

## 2023-06-01 DIAGNOSIS — R11.0 CHEMOTHERAPY-INDUCED NAUSEA: Primary | ICD-10-CM

## 2023-06-01 DIAGNOSIS — T45.1X5A CHEMOTHERAPY-INDUCED NAUSEA: Primary | ICD-10-CM

## 2023-06-01 RX ORDER — ONDANSETRON HCL IN 0.9 % NACL 8 MG/50 ML
8 INTRAVENOUS SOLUTION, PIGGYBACK (ML) INTRAVENOUS ONCE
Status: CANCELLED | OUTPATIENT
Start: 2023-06-02

## 2023-06-02 ENCOUNTER — HOSPITAL ENCOUNTER (OUTPATIENT)
Dept: ONCOLOGY | Facility: HOSPITAL | Age: 63
Discharge: HOME | End: 2023-06-02
Admitting: INTERNAL MEDICINE

## 2023-06-02 VITALS
SYSTOLIC BLOOD PRESSURE: 150 MMHG | DIASTOLIC BLOOD PRESSURE: 80 MMHG | OXYGEN SATURATION: 97 % | RESPIRATION RATE: 16 BRPM | HEART RATE: 90 BPM | TEMPERATURE: 99 F

## 2023-06-02 DIAGNOSIS — Z45.2 ENCOUNTER FOR ADJUSTMENT OR MANAGEMENT OF VASCULAR ACCESS DEVICE: ICD-10-CM

## 2023-06-02 DIAGNOSIS — R11.0 CHEMOTHERAPY-INDUCED NAUSEA: Primary | ICD-10-CM

## 2023-06-02 DIAGNOSIS — R63.8 DEHYDRATION SYMPTOMS: ICD-10-CM

## 2023-06-02 DIAGNOSIS — T45.1X5A CHEMOTHERAPY-INDUCED NAUSEA: Primary | ICD-10-CM

## 2023-06-02 PROCEDURE — 96360 HYDRATION IV INFUSION INIT: CPT

## 2023-06-02 PROCEDURE — 96374 THER/PROPH/DIAG INJ IV PUSH: CPT

## 2023-06-02 PROCEDURE — 25010000002 DEXAMETHASONE PER 1 MG: Performed by: INTERNAL MEDICINE

## 2023-06-02 PROCEDURE — 96361 HYDRATE IV INFUSION ADD-ON: CPT

## 2023-06-02 PROCEDURE — 25010000002 ONDANSETRON PER 1 MG: Performed by: INTERNAL MEDICINE

## 2023-06-02 PROCEDURE — 25010000002 HEPARIN LOCK FLUSH PER 10 UNITS: Performed by: INTERNAL MEDICINE

## 2023-06-02 PROCEDURE — 96375 TX/PRO/DX INJ NEW DRUG ADDON: CPT

## 2023-06-02 RX ORDER — SODIUM CHLORIDE 0.9 % (FLUSH) 0.9 %
20 SYRINGE (ML) INJECTION AS NEEDED
OUTPATIENT
Start: 2023-06-02

## 2023-06-02 RX ORDER — DEXAMETHASONE SODIUM PHOSPHATE 4 MG/ML
4 INJECTION, SOLUTION INTRA-ARTICULAR; INTRALESIONAL; INTRAMUSCULAR; INTRAVENOUS; SOFT TISSUE ONCE
Status: COMPLETED | OUTPATIENT
Start: 2023-06-02 | End: 2023-06-02

## 2023-06-02 RX ORDER — SODIUM CHLORIDE 0.9 % (FLUSH) 0.9 %
20 SYRINGE (ML) INJECTION AS NEEDED
Status: DISCONTINUED | OUTPATIENT
Start: 2023-06-02 | End: 2023-06-04 | Stop reason: HOSPADM

## 2023-06-02 RX ORDER — HEPARIN SODIUM (PORCINE) LOCK FLUSH IV SOLN 100 UNIT/ML 100 UNIT/ML
500 SOLUTION INTRAVENOUS AS NEEDED
Status: DISCONTINUED | OUTPATIENT
Start: 2023-06-02 | End: 2023-06-04 | Stop reason: HOSPADM

## 2023-06-02 RX ORDER — ONDANSETRON HCL IN 0.9 % NACL 8 MG/50 ML
8 INTRAVENOUS SOLUTION, PIGGYBACK (ML) INTRAVENOUS ONCE
Status: COMPLETED | OUTPATIENT
Start: 2023-06-02 | End: 2023-06-02

## 2023-06-02 RX ORDER — DEXAMETHASONE SODIUM PHOSPHATE 4 MG/ML
4 INJECTION, SOLUTION INTRA-ARTICULAR; INTRALESIONAL; INTRAMUSCULAR; INTRAVENOUS; SOFT TISSUE ONCE
Status: CANCELLED
Start: 2023-06-02 | End: 2023-06-02

## 2023-06-02 RX ORDER — HEPARIN SODIUM (PORCINE) LOCK FLUSH IV SOLN 100 UNIT/ML 100 UNIT/ML
500 SOLUTION INTRAVENOUS AS NEEDED
OUTPATIENT
Start: 2023-06-02

## 2023-06-02 RX ADMIN — HEPARIN SODIUM (PORCINE) LOCK FLUSH IV SOLN 100 UNIT/ML 500 UNITS: 100 SOLUTION at 15:28

## 2023-06-02 RX ADMIN — DEXAMETHASONE SODIUM PHOSPHATE 4 MG: 4 INJECTION, SOLUTION INTRA-ARTICULAR; INTRALESIONAL; INTRAMUSCULAR; INTRAVENOUS; SOFT TISSUE at 14:13

## 2023-06-02 RX ADMIN — Medication 20 ML: at 15:28

## 2023-06-02 RX ADMIN — ONDANSETRON 8 MG: 2 INJECTION INTRAMUSCULAR; INTRAVENOUS at 14:15

## 2023-06-02 RX ADMIN — SODIUM CHLORIDE 1000 ML: 9 INJECTION, SOLUTION INTRAVENOUS at 14:05

## 2023-06-07 ENCOUNTER — HOSPITAL ENCOUNTER (OUTPATIENT)
Dept: OCCUPATIONAL THERAPY | Facility: HOSPITAL | Age: 63
Setting detail: THERAPIES SERIES
Discharge: HOME | End: 2023-06-07
Payer: MEDICAID

## 2023-06-07 DIAGNOSIS — R52 PAIN: ICD-10-CM

## 2023-06-07 DIAGNOSIS — M25.60 JOINT STIFFNESS: ICD-10-CM

## 2023-06-07 DIAGNOSIS — I89.0 LYMPHEDEMA: Primary | ICD-10-CM

## 2023-06-07 DIAGNOSIS — L90.5 SCAR CONDITION AND FIBROSIS OF SKIN: ICD-10-CM

## 2023-06-07 PROCEDURE — 97165 OT EVAL LOW COMPLEX 30 MIN: CPT | Performed by: OCCUPATIONAL THERAPIST

## 2023-06-07 NOTE — THERAPY EVALUATION
Outpatient Occupational Therapy Lymphedema Initial Evaluation  [facility]     Patient Name: Carina Serna  : 1960  MRN: [id number]  Today's Date: 2023      Visit Date: 2023    Patient Active Problem List   Diagnosis    Primary tongue squamous cell carcinoma    Mass of pharynx    Primary squamous cell carcinoma of base of tongue    Odynophagia    Encounter for adjustment or management of vascular access device    Dehydration symptoms    Chemotherapy-induced nausea        Past Medical History:   Diagnosis Date    Dental disease     PONV (postoperative nausea and vomiting)     Primary squamous cell carcinoma of base of tongue     NEWLY DIAGNOSED AND NO TYPE OF TREATMENT DONE THUS FAR 23    Sore throat         Past Surgical History:   Procedure Laterality Date    BLADDER SURGERY      DIRECT LARYNGOSCOPY, ESOPHAGOSCOPY, BRONCHOSCOPY N/A 02/10/2023    Procedure: DIRECT LARYNGOSCOPY, ESOPHAGOSCOPY, BRONCHOSCOPY;  Surgeon: Migel Escobar MD;  Location: [facility] [place] [facility] [place]  Service: ENT;  Laterality: N/A;    EYE SURGERY      CATARACT REMOVED BILATERALLY    HYSTERECTOMY      PEG TUBE INSERTION N/A 2023    Procedure: PERCUTANEOUS ENDOSCOPIC GASTROSTOMY TUBE INSERTION;  Surgeon: Frank Ellington MD;  Location: [facility] MAIN OR;  Service: General;  Laterality: N/A;    TONSILLECTOMY      VENOUS ACCESS DEVICE (PORT) INSERTION N/A 2023    Procedure: INSERTION OF PORTACATH ,insertion of percutaneoous endoscopic gastrostomy tube;  Surgeon: Frank Ellington MD;  Location: [facility] MAIN OR;  Service: General;  Laterality: N/A;         Visit Dx:     ICD-10-CM ICD-9-CM   1. Lymphedema  I89.0 457.1   2. Scar condition and fibrosis of skin  L90.5 709.2   3. Joint stiffness  M25.60 719.50   4. Pain  R52 780.96        Patient History       Row Name 23 0800             History    Chief Complaint --  Face swelling  -TD      Brief Description of Current Complaint Carina is a 63 year old female with a  diagnosis of squamous cell carcinoma at the base of the tongue. Pt underwent a cyst removal in the right jaw area and reconstruction was done in 3/2023.  Pt recieved 7 rounds of chemo and underwent 35 rounds of radiation.  -TD         Fall Risk Assessment    Any falls in the past year: No  -TD      Does patient have a fear of falling No  -TD         Services    Are you currently receiving Home Health services No  -TD      Do you plan to receive Home Health services in the near future No  -TD         Daily Activities    Primary Language English  -TD      Are you able to read Yes  -TD      Are you able to write Unspecified  -TD      How does patient learn best? Listening;Reading;Demonstration;Pictures/Video  -TD         Safety    Are you being hurt, hit, or frightened by anyone at home or in your life? No  -TD      Are you being neglected by a caregiver No  -TD      Have you had any of the following issues with N/A  -TD                User Key  (r) = Recorded By, (t) = Taken By, (c) = Cosigned By      Initials Name Provider Type    TD Amy Isidro OT Occupational Therapist                     Lymphedema       Row Name 06/07/23 0800             Subjective Pain    Able to rate subjective pain? yes  -TD      Pre-Treatment Pain Level 2  -TD      Post-Treatment Pain Level 2  -TD         Subjective Comments    Subjective Comments Pt concerned about face swelling  -TD         Lymphedema Assessment    Lymphedema Classification Face:;Neck:;at risk/stage 0;stage 1 (Spontaneously Reversible)  -TD      Lymphedema Cancer Related Sx right  cyst removal and mandiable reconstruction  -TD      Lymphedema Surgery Comments 3/2023  -TD      Chemo Received yes  -TD      Chemo Treatments #/Timeframe 7 weeks  -TD      Radiation Therapy Received yes  -TD      Radiation Treatments #/Timeframe 35  -TD         LLIS - Physical Concerns    The amount of pain associated with my lymphedema is: 2  -TD      The amount of limb heaviness associated  "with my lymphedema is: 2  -TD      The amount of skin tightness associated with my lymphedema is: 2  -TD      The size of my swollen limb(s) seems: 2  -TD      Lymphedema affects the movement of my swollen limb(s): 1  -TD      The strength in my swollen limb(s) is: 1  -TD         LLIS - Psychosocial Concerns    Lymphedema affects my body image (i.e., \"how I think I look\"). 0  -TD      Lymphedema affects my socializing with others. 0  -TD      Lymphedema affects my intimate relations with spouse or partner (rate 0 if not applicable 0  -TD      Lymphedema \"gets me down\" (i.e., depression, frustration, or anger) 0  -TD      I must rely on others for help due to my lymphedema. 0  -TD      I know what to do to manage my lymphedema 4  -TD         LLIS - Functional Concerns    Lymphedema affects my ability to perform self-care activities (i.e. eating, dressing, hygiene) 1  -TD      Lymphedema affects my ability to perform routine home or work-related activities. 2  -TD      Lymphedema affects my performance of preferred leisure activities. 2  -TD      Lymphedema affects proper fit of clothing/shoes 0  -TD      Lymphedema affects my sleep 0  -TD         Posture/Observations    Alignment Options Forward head;Rounded shoulders  -TD         General ROM    GENERAL ROM COMMENTS Neck flexion/extention and head rotation is WFL but patient reports tightness at end ranges.  -TD         MMT (Manual Muscle Testing)    General MMT Comments Pt reports she feels an over all weakness throughout her body since treatment.  -TD         Skin Changes/Observations    Location/Assessment Head/Neck  -TD      Head/Neck Conditions normal;intact;clean  -TD      Head/Neck Color/Pigment normal;radiation fibrosis  -TD         Lymphedema Measurements    Measurement Type(s) Circumferential  -TD      Circumferential Areas Head;Neck  -TD         Lymphedema Life Impact Scale Totals    A.  Total Q1 - Q17 (Do not include Q18) 19  -TD      B.  Total number of " questions answered (Q1-Q17) 17  -TD      C. Divide A by B 1.12  -TD      D. Multiple C by 25 28  -TD                User Key  (r) = Recorded By, (t) = Taken By, (c) = Cosigned By      Initials Name Provider Type    Amy Dillon, GOSIA Occupational Therapist                                Therapy Education  Education Details: Pt was educated on lymphedema and the expectations of the lymphedema clinic.  Pt was educated on self MLD.  Given: HEP, Symptoms/condition management  Program: New  How Provided: Verbal  Provided to: Patient  Level of Understanding: Teach back education performed         OT Goals       Row Name 06/07/23 0838          Time Calculation    OT Goal Re-Cert Due Date 07/07/23  -TD               User Key  (r) = Recorded By, (t) = Taken By, (c) = Cosigned By      Initials Name Provider Type    Amy Dillon, GOSIA Occupational Therapist                1. Uncontrolled lymphedema of right face.  LTG1:  90 days:  Volumetric measurements of right face will be decreased by 5 % or until plateau in reduction is achieved to improve functional mobility.           STATUS:  New   STG1a:  30 days:  Volumetric measurements of  right face will be decreased by 3 % bilaterally to improve functional mobility.    STATUS:  New  TREATMENT:  Manual Therapy, Therapeutic exercise, ADL/self-care therapy, Bioimpedence Fluid Analysis, Orthotic management and training, Therapeutic Activity, wound dressing as needed, Modalities: TENS, NMES, Ultrasound, Fluidotherapy, and Patient and family/caregiver education.    2. Patient with decreased ADL/Self-Care routine for lymphedema management.   LTG 2:  90 days:  Patient/caregiver will independently verbalize/demonstrate ADL/Self-Care routine for lymphedema management.           STATUS:  New   STG 2a:  30 days:  Patient/caregiver will independently perform or verbally dictate compression wrapping technique of the upper extremity/upper quadrant.           STATUS:  New   STG2b:  30 days:   Patient will independently verbalize signs and symptoms of infection.                    STATUS:  New   STG2c:  30 days:  Patient will independently demonstrate/verbalize proper skin care routine to prevent infection and or wound development.            STATUS:  New  STG2d:  30 days:  Patient will independently perform teach back of HEP routine.           STATUS: New  STG2e:  30 days:  Patient/caregiver will obtain properly fitting compression orthosis, will demonstrate don/doff skill of compression orthosis with modified independence, and independently verbalize wear schedule.          STATUS: New   STG2f: 30 days: Patient/caregiver will independently perform self-manual lymphatic drainage of the upper extremity/upper quadrant.          STATUS: New  TREATMENT:  Therapeutic Activity, Patient/Caregiver Education, ADL retraining, Manual Therapy, Orthotic Management and training, Modalities: TENS, NMES, Ultrasound, Fluidotherapy Wound dressing if necessary, Therapeutic Exercise, Modalities    3. Self-Care Limitations     LTG 3: 90 days:  The patient will demonstrate improved quality of life by achieving a score of 20 on the Lymphedema Life Impact Scale.           STATUS:  New   STG 3a: 30 days:  The patient will demonstrate improved quality of life by achieving a score of 25 on the Lymphedema Life Impact Scale.           STATUS:  New  TREATMENT:  Manual therapy, therapeutic exercise, therapeutic activity, ADL retraining, Patient/caregiver education, Modalities: TENS, NMES, Ultrasound, Fluidotherapy, Orthotic Management and Training, wound dressing as needed.     OT Assessment/Plan       Row Name 06/07/23 0833          OT Assessment    Functional Limitations Limitations in functional capacity and performance  -TD     Impairments Impaired lymphatic circulation;Range of motion;Pain  -TD     Assessment Comments Carina is a 63 year old female with a diagnosis of squamous cell carcinoma at the base of the tongue. Pt  underwent a cyst removal in the right jaw area and reconstruction was done in 3/2023.  Pt recieved 7 rounds of chemo and underwent 35 rounds of radiation.  Pt reports AROM is within funtional limits but she has swelling at the right side of the face. Pt would benefit from continued skilled OT to prevent increased staging of lymphedema, decreased AROM, and increased pain.  -TD     OT Diagnosis lymphedema  -TD     OT Rehab Potential Good  -TD     Patient/caregiver participated in establishment of treatment plan and goals Yes  -TD     Patient would benefit from skilled therapy intervention Yes  -TD        OT Plan    OT Frequency 1x/week;2x/week  -TD     Predicted Duration of Therapy Intervention (OT) 8 weeks  -TD     Planned CPT's? OT EVAL LOW COMPLEXITY: 84960;OT RE-EVAL: 17521;OT THER ACT EA 15 MIN: [id number];OT THER PROC EA 15 MIN: [id number];OT SELF CARE/MGMT/TRAIN 15 MIN: 27606;OT MANUAL THERAPY EA 15 MIN: 37593;OT BIS XTRACELL FLUID ANALYSIS: 62519;OT CARE PLAN EA 15 MIN;OT ORTHOTIC MGMT/TRAIN EA 15 MIN: 08870;OT ORTHO/PROSTHET CHECKOUT EA 15 MIN: 84753  -TD     Planned Therapy Interventions (Optional Details) home exercise program;manual therapy techniques;stretching;strengthening;ROM (Range of Motion);prosthetic fitting/training;patient/family education;orthotic fitting/training  -TD     OT Plan Comments Intiate POC  -TD               User Key  (r) = Recorded By, (t) = Taken By, (c) = Cosigned By      Initials Name Provider Type    TD Amy Isidro OT Occupational Therapist                              Time Calculation:   Untimed Charges  OT Eval/Re-eval Minutes: 55  Total Minutes  Untimed Charges Total Minutes: 55   Total Minutes: 55     Therapy Charges for Today       Code Description Service Date Service Provider Modifiers Qty    27208314801 HC OT EVAL LOW COMPLEXITY 4 6/7/2023 Amy Isidro OT GO 1                      Amy Isidro OT  6/7/2023

## 2023-06-16 ENCOUNTER — TELEPHONE (OUTPATIENT)
Dept: RADIATION ONCOLOGY | Facility: HOSPITAL | Age: 63
End: 2023-06-16
Payer: MEDICAID

## 2023-06-16 NOTE — TELEPHONE ENCOUNTER
Attempted to call pt to follow up with her since completing radiation treatment.  Pt had a follow up with Chelly Darling NP on 06/08/2023 and she canceled that appointment.  We need to get her rescheduled.  Left message on patients voicemail to call our office back once message received.

## 2023-07-03 ENCOUNTER — APPOINTMENT (OUTPATIENT)
Dept: OCCUPATIONAL THERAPY | Facility: HOSPITAL | Age: 63
End: 2023-07-03
Payer: MEDICAID

## 2023-07-06 ENCOUNTER — APPOINTMENT (OUTPATIENT)
Dept: OCCUPATIONAL THERAPY | Facility: HOSPITAL | Age: 63
End: 2023-07-06
Payer: MEDICAID

## 2023-07-11 ENCOUNTER — APPOINTMENT (OUTPATIENT)
Dept: OCCUPATIONAL THERAPY | Facility: HOSPITAL | Age: 63
End: 2023-07-11
Payer: MEDICAID

## 2023-07-14 ENCOUNTER — APPOINTMENT (OUTPATIENT)
Dept: OCCUPATIONAL THERAPY | Facility: HOSPITAL | Age: 63
End: 2023-07-14
Payer: MEDICAID

## 2023-08-21 ENCOUNTER — HOSPITAL ENCOUNTER (OUTPATIENT)
Dept: PET IMAGING | Facility: HOSPITAL | Age: 63
Discharge: HOME | End: 2023-08-21
Payer: MEDICAID

## 2023-08-21 ENCOUNTER — LAB (OUTPATIENT)
Dept: ONCOLOGY | Facility: HOSPITAL | Age: 63
End: 2023-08-21
Payer: MEDICAID

## 2023-08-21 DIAGNOSIS — C01 PRIMARY SQUAMOUS CELL CARCINOMA OF BASE OF TONGUE: ICD-10-CM

## 2023-08-21 LAB
BASOPHILS # BLD AUTO: 0.02 10*3/MM3 (ref 0–0.2)
BASOPHILS NFR BLD AUTO: 0.3 % (ref 0–1.5)
DEPRECATED RDW RBC AUTO: 39.9 FL (ref 37–54)
EOSINOPHIL # BLD AUTO: 0.05 10*3/MM3 (ref 0–0.4)
EOSINOPHIL NFR BLD AUTO: 0.8 % (ref 0.3–6.2)
ERYTHROCYTE [DISTWIDTH] IN BLOOD BY AUTOMATED COUNT: 11.6 % (ref 12.3–15.4)
HCT VFR BLD AUTO: 33.9 % (ref 34–46.6)
HGB BLD-MCNC: 10.9 G/DL (ref 12–15.9)
IMM GRANULOCYTES # BLD AUTO: 0.01 10*3/MM3 (ref 0–0.05)
IMM GRANULOCYTES NFR BLD AUTO: 0.2 % (ref 0–0.5)
LYMPHOCYTES # BLD AUTO: 0.7 10*3/MM3 (ref 0.7–3.1)
LYMPHOCYTES NFR BLD AUTO: 11.6 % (ref 19.6–45.3)
MCH RBC QN AUTO: 30.2 PG (ref 26.6–33)
MCHC RBC AUTO-ENTMCNC: 32.2 G/DL (ref 31.5–35.7)
MCV RBC AUTO: 93.9 FL (ref 79–97)
MONOCYTES # BLD AUTO: 0.38 10*3/MM3 (ref 0.1–0.9)
MONOCYTES NFR BLD AUTO: 6.3 % (ref 5–12)
NEUTROPHILS NFR BLD AUTO: 4.88 10*3/MM3 (ref 1.7–7)
NEUTROPHILS NFR BLD AUTO: 80.8 % (ref 42.7–76)
PLATELET # BLD AUTO: 253 10*3/MM3 (ref 140–450)
PMV BLD AUTO: 10 FL (ref 6–12)
RBC # BLD AUTO: 3.61 10*6/MM3 (ref 3.77–5.28)
T4 FREE SERPL-MCNC: 1.17 NG/DL (ref 0.93–1.7)
TSH SERPL DL<=0.05 MIU/L-ACNC: 2.54 UIU/ML (ref 0.27–4.2)
WBC NRBC COR # BLD: 6.04 10*3/MM3 (ref 3.4–10.8)

## 2023-08-21 PROCEDURE — 85025 COMPLETE CBC W/AUTO DIFF WBC: CPT

## 2023-08-21 PROCEDURE — 78815 PET IMAGE W/CT SKULL-THIGH: CPT

## 2023-08-21 PROCEDURE — 84439 ASSAY OF FREE THYROXINE: CPT

## 2023-08-21 PROCEDURE — 0 FLUDEOXYGLUCOSE F18 SOLUTION: Performed by: RADIOLOGY

## 2023-08-21 PROCEDURE — 36415 COLL VENOUS BLD VENIPUNCTURE: CPT

## 2023-08-21 PROCEDURE — A9552 F18 FDG: HCPCS | Performed by: RADIOLOGY

## 2023-08-21 PROCEDURE — 84443 ASSAY THYROID STIM HORMONE: CPT

## 2023-08-21 RX ADMIN — FLUDEOXYGLUCOSE F18 1 DOSE: 300 INJECTION INTRAVENOUS at 08:32

## 2023-08-22 ENCOUNTER — OFFICE VISIT (OUTPATIENT)
Dept: ONCOLOGY | Facility: HOSPITAL | Age: 63
End: 2023-08-22
Payer: MEDICAID

## 2023-08-22 ENCOUNTER — LAB (OUTPATIENT)
Dept: ONCOLOGY | Facility: HOSPITAL | Age: 63
End: 2023-08-22
Payer: MEDICAID

## 2023-08-22 VITALS
DIASTOLIC BLOOD PRESSURE: 69 MMHG | TEMPERATURE: 97.5 F | SYSTOLIC BLOOD PRESSURE: 142 MMHG | HEART RATE: 71 BPM | RESPIRATION RATE: 18 BRPM | OXYGEN SATURATION: 98 %

## 2023-08-22 DIAGNOSIS — T45.1X5A ANEMIA ASSOCIATED WITH CHEMOTHERAPY: ICD-10-CM

## 2023-08-22 DIAGNOSIS — D64.81 ANEMIA ASSOCIATED WITH CHEMOTHERAPY: ICD-10-CM

## 2023-08-22 DIAGNOSIS — C01 PRIMARY SQUAMOUS CELL CARCINOMA OF BASE OF TONGUE: Primary | ICD-10-CM

## 2023-08-22 PROBLEM — C02.9 PRIMARY TONGUE SQUAMOUS CELL CARCINOMA: Status: RESOLVED | Noted: 2023-01-19 | Resolved: 2023-08-22

## 2023-08-22 LAB
ALBUMIN SERPL-MCNC: 4.3 G/DL (ref 3.5–5.2)
ALBUMIN/GLOB SERPL: 1.7 G/DL
ALP SERPL-CCNC: 79 U/L (ref 39–117)
ALT SERPL W P-5'-P-CCNC: 7 U/L (ref 1–33)
ANION GAP SERPL CALCULATED.3IONS-SCNC: 9.9 MMOL/L (ref 5–15)
AST SERPL-CCNC: 14 U/L (ref 1–32)
BILIRUB SERPL-MCNC: 0.2 MG/DL (ref 0–1.2)
BUN SERPL-MCNC: 13 MG/DL (ref 8–23)
BUN/CREAT SERPL: 20.3 (ref 7–25)
CALCIUM SPEC-SCNC: 10.4 MG/DL (ref 8.6–10.5)
CHLORIDE SERPL-SCNC: 98 MMOL/L (ref 98–107)
CO2 SERPL-SCNC: 27.1 MMOL/L (ref 22–29)
CREAT SERPL-MCNC: 0.64 MG/DL (ref 0.57–1)
EGFRCR SERPLBLD CKD-EPI 2021: 99.4 ML/MIN/1.73
GLOBULIN UR ELPH-MCNC: 2.5 GM/DL
GLUCOSE SERPL-MCNC: 93 MG/DL (ref 65–99)
POTASSIUM SERPL-SCNC: 4.6 MMOL/L (ref 3.5–5.2)
PROT SERPL-MCNC: 6.8 G/DL (ref 6–8.5)
SODIUM SERPL-SCNC: 135 MMOL/L (ref 136–145)

## 2023-08-22 PROCEDURE — 80053 COMPREHEN METABOLIC PANEL: CPT

## 2023-08-22 PROCEDURE — G0463 HOSPITAL OUTPT CLINIC VISIT: HCPCS | Performed by: INTERNAL MEDICINE

## 2023-08-22 PROCEDURE — 36415 COLL VENOUS BLD VENIPUNCTURE: CPT

## 2023-08-22 NOTE — PROGRESS NOTES
Chief Complaint  SQUAMOUS CELL CARCINOMA OF BASE OF TOUNGE    Edilma Peraza, GRACY Peraza, Edilma, GRACY Lim Avelina Serna presents to [facility] for base of tongue cancer.    Ms. Serna presents for follow up with medical oncology evaluation is due to head and neck cancer at base of tongue. She is doing well overall. She is eating by mouth. She still uses PEG tube for supplemental feeds. Reports it is about half and half via mouth and via PEG tube. Weight is stable.  No pain or nausea reported. Energy level is improving. She follows up with Dr. Weiner on Thursday. Recent PET scan with YAMILKA.     Oncology/Hematology History Overview Note   1/19/23: Referred to Dr. Escobar for evaluation after several months of right sided sore throat. She underwent flexible fiberoptic laryngoscopy revealing a mass at the right lateral base of tongue and glossotonsillar sulcus.  Pathology from biopsy revealed HPV negative, moderately differentiated squamous cell carcinoma.      1/25/2023: CT scan of the soft tissue neck demonstrated a 2.7 x 2.3 cm abnormal soft tissue density likely representing neoplasm centered at the lateral right oropharynx with extension toward the lingual tonsil.  There is a moderately suspicious rounded right level 2 lymph node measuring 0.7 cm in the short axis.  CT scan of the chest revealed a subcarinal lymph node at the upper limit of normal for size.      3/6/23: NM PET: 1. Intense abnormal radiopharmaceutical accumulation corresponding to the soft tissue mass at the   right aspect of the oropharynx consistent with malignancy.  2. Mildly increased radiopharmaceutical accumulation corresponding to lymph nodes along the   cervical/jugular chain bilaterally indicating developing malignant metastatic lymphadenopathy.    3. No evidence for additional malignancy or more distal metastatic disease.    4. Mild to moderate changes of  emphysema/COPD are noted.  5. Coronary artery calcifications are observed.  6. Areas of ill-defined ground-glass density in the right upper lobe.  These findings may relate to   areas of scarring.  Developing infiltrates in the setting of right upper lobe pneumonia could be   considered.  Recommend correlation for signs or symptoms of acute infection.  Also recommend   follow-up CT of the chest in 6 months to document stability versus resolution.    4/10/23: C1D1 Cisplatin weekly. First dose of radiation.     4/17/23: C2 chemo, switched to Carbo/taxol due to shortage of Cisplatin. Comlicated by reaction to Taxol. She did receive Carboplatin    4/23/23: C3 chemo, switched to Carboplatin/infusional 5-FU    5/15/23: C4 chemo, switched back to weekly Cisplatin    5/22/23: C5 chemo, weekly Cisplatin. Last dose of chemo.     5/25/23: Last dose of XRT    7/7/2023: CT Neck: Status post right internal jugular chain node dissection.  Post radiation change with mucosal enhancement and interval complete resolution of enhancing mass involving the right tonsil. No evidence of metastatic disease within the neck. Postoperative changes of the mandible related to the cyst resection and bone grafting.    8/21/23: NM PET/CT: No convincing PET-CT signs of disease recurrence or progressive metastatic disease.  Resolved previously noted hypermetabolic right oropharyngeal mass. Mild right middle lobe airspace opacities with associated FDG uptake.  Mild uptake noted within small right hilar and mediastinal nodes unchanged in size from prior exam.  Findings favor nonspecific infectious/inflammatory process with reactive lymph nodes.  Recommend attention on follow-up imaging. Nonfocal FDG uptake throughout the esophagus, correlate for signs of esophagitis.           Primary tongue squamous cell carcinoma (Resolved)   1/19/2023 Initial Diagnosis    Primary tongue squamous cell carcinoma (HCC)     4/10/2023 - 4/10/2023 Chemotherapy    OP HEAD &  NECK CISplatin (Weekly) + XRT       4/24/2023 - 4/28/2023 Chemotherapy    OP HEAD & NECK CARBOplatin / Fluorouracil CIV + XRT       5/15/2023 - 5/22/2023 Chemotherapy    OP HEAD & NECK CISplatin (Weekly) + XRT       Primary squamous cell carcinoma of base of tongue   2/24/2023 Initial Diagnosis    Primary squamous cell carcinoma of base of tongue     4/17/2023 - 4/17/2023 Biopsy    OP HEAD & NECK PACLitaxel 50 mg/m2 / CARBOplatin AUC=2 (weekly) + XRT  Plan Provider: Alexa Connelly MD PhD  Treatment goal: Curative  Line of treatment: [No plan line of treatment]     5/10/2023 -  Chemotherapy    OP HYDRATION AND ANTIEMETICS       Malignant neoplasm of base of tongue (Resolved)   3/30/2023 Initial Diagnosis    Malignant neoplasm of base of tongue (HCC)     3/30/2023 -  Radiation    RADIATION THERAPY Treatment Details (Noted on 3/30/2023)  Site: Head and Neck  Technique: IMRT  Goal: No goal specified  Planned Treatment Start Date: No planned start date specified         Review of Systems   Constitutional:  Negative for fatigue.   HENT:  Positive for sore throat (with eating).    All other systems reviewed and are negative.      Current Outpatient Medications on File Prior to Visit   Medication Sig Dispense Refill    aluminum-magnesium hydroxide-simethicone (Maalox Max) 400-400-40 MG/5ML suspension Take 10 mL by mouth Every 6 (Six) Hours As Needed for Indigestion or Heartburn. (Patient not taking: Reported on 6/26/2023) 355 mL 0    chlorhexidine (PERIDEX) 0.12 % solution  (Patient not taking: Reported on 6/26/2023)      diphenhydrAMINE 12.5 MG/5ML elixir 20 mL, aluminum-magnesium hydroxide-simethicone 400-400-40 MG/5ML suspension 20 mL, Lidocaine Viscous HCl 2 % solution 20 mL Swish and spit 15 mL Every 4 (Four) Hours As Needed for Mucositis. (Patient not taking: Reported on 6/26/2023) 300 mL 2    fluconazole (DIFLUCAN) 100 MG tablet Take 1 tablet by mouth Daily. (Patient not taking: Reported on 6/26/2023) 7 tablet 0     FLUoxetine (PROzac) 10 MG tablet Take 1 tablet by mouth Daily. (Patient not taking: Reported on 7/10/2023)      HYDROcodone-acetaminophen (NORCO) 7.5-325 MG per tablet Take 1 tablet by mouth Every 6 (Six) Hours As Needed for Moderate Pain. (Patient not taking: Reported on 6/26/2023) 60 tablet 0    ibuprofen (ADVIL,MOTRIN) 600 MG tablet TAKE 1 TABLET BY MOUTH EVERY 6 HOURS. NOT TO EXCEED 3200 MG DAILY (Patient not taking: Reported on 6/26/2023)      Lidocaine Viscous HCl (XYLOCAINE) 2 % solution Take 2 mL by mouth As Needed for Mild Pain. Mix with Maalox every 6 hours (Patient not taking: Reported on 6/26/2023) 100 mL 0    losartan (COZAAR) 25 MG tablet Take 1 tablet by mouth Daily. (Patient not taking: Reported on 6/26/2023)      nystatin (MYCOSTATIN) 100,000 unit/mL suspension Swish and spit 5 mL 4 (Four) Times a Day. (Patient not taking: Reported on 6/26/2023) 300 mL 0    Nystatin-Diphenhydramine-Hydrocortisone-Lidocaine Swish and spit 5 mL Every 3 (Three) Hours As Needed. (Patient not taking: Reported on 6/26/2023) 300 mL 1    OLANZapine (zyPREXA) 5 MG tablet Take 1 tablet by mouth Every Night. Take on days 2, 3 and 4 after chemotherapy. (Patient not taking: Reported on 6/26/2023) 3 tablet 5    ondansetron (ZOFRAN) 8 MG tablet Take 1 tablet by mouth Every 8 (Eight) Hours As Needed for Nausea or Vomiting. (Patient not taking: Reported on 6/26/2023) 30 tablet 2    prochlorperazine (COMPAZINE) 10 MG tablet Take 1 tablet by mouth Every 6 (Six) Hours As Needed for Nausea or Vomiting. (Patient not taking: Reported on 6/26/2023) 30 tablet 1     Current Facility-Administered Medications on File Prior to Visit   Medication Dose Route Frequency Provider Last Rate Last Admin    [DISCONTINUED] fludeoxyglucose F18 injection 1 dose  1 dose Intravenous Once in imaging Nikolai Weiner MD           Allergies   Allergen Reactions    Paclitaxel Anaphylaxis     Past Medical History:   Diagnosis Date    Dental disease     PONV  (postoperative nausea and vomiting)     Primary squamous cell carcinoma of base of tongue     NEWLY DIAGNOSED AND NO TYPE OF TREATMENT DONE THUS FAR 23    Sore throat      Past Surgical History:   Procedure Laterality Date    BLADDER SURGERY      DIRECT LARYNGOSCOPY, ESOPHAGOSCOPY, BRONCHOSCOPY N/A 02/10/2023    Procedure: DIRECT LARYNGOSCOPY, ESOPHAGOSCOPY, BRONCHOSCOPY;  Surgeon: Migel Escobar MD;  Location: [facility];  Service: ENT;  Laterality: N/A;    EYE SURGERY      CATARACT REMOVED BILATERALLY    HYSTERECTOMY      PEG TUBE INSERTION N/A 2023    Procedure: PERCUTANEOUS ENDOSCOPIC GASTROSTOMY TUBE INSERTION;  Surgeon: Frank Ellington MD;  Location: [facility] OR;  Service: General;  Laterality: N/A;    TONSILLECTOMY      VENOUS ACCESS DEVICE (PORT) INSERTION N/A 2023    Procedure: INSERTION OF PORTACATH ,insertion of percutaneoous endoscopic gastrostomy tube;  Surgeon: Frank Ellington MD;  Location: [facility];  Service: General;  Laterality: N/A;     Social History     Socioeconomic History    Marital status:    Tobacco Use    Smoking status: Former     Packs/day: 1.00     Years: 30.00     Pack years: 30.00     Types: Cigarettes     Quit date: 2023     Years since quittin.5    Smokeless tobacco: Never   Vaping Use    Vaping Use: Never used   Substance and Sexual Activity    Alcohol use: Not Currently    Drug use: Never    Sexual activity: Defer     Family History   Problem Relation Age of Onset    Cancer Mother         Colon cancer    Heart attack Father     Colon cancer Maternal Aunt     Malig Hyperthermia Neg Hx        Objective   Physical Exam  Well appearing patient in no acute distress on RA  Anicteric sclerae, no rash on exposed skin  Respirations non-labored  Awake, alert, and oriented x 4. Speech intact.   Appropriate mood and affect    ECOG 0.    Vitals:    23 1021   BP: 142/69   Pulse: 71   Resp: 18   Temp: 97.5 øF (36.4 øC)   SpO2: 98%   PainSc:  0-No pain               PHQ-9 Total Score:         The patient is not  experiencing fatigue.   PT/OT Functional Screening: PT fx screen: No needs identified  Speech Functional Screening: Speech fx screen: No needs identified  Rehab to be ordered: Rehab to be ordered: No needs identified      Result Review :   The following data was reviewed by: Sarmad Cohen MD on 08/22/23:  Lab Results   Component Value Date    HGB 10.9 (L) 08/21/2023    HCT 33.9 (L) 08/21/2023    MCV 93.9 08/21/2023     08/21/2023    WBC 6.04 08/21/2023    NEUTROABS 4.88 08/21/2023    LYMPHSABS 0.70 08/21/2023    MONOSABS 0.38 08/21/2023    EOSABS 0.05 08/21/2023    BASOSABS 0.02 08/21/2023     Lab Results   Component Value Date    GLUCOSE 91 07/10/2023    BUN 10 07/10/2023    CREATININE 0.74 07/10/2023     (L) 07/10/2023    K 3.9 07/10/2023    CL 96 (L) 07/10/2023    CO2 27.4 07/10/2023    CALCIUM 10.2 07/10/2023    PROTEINTOT 7.7 07/10/2023    ALBUMIN 4.6 07/10/2023    BILITOT 0.2 07/10/2023    ALKPHOS 86 07/10/2023    AST 17 07/10/2023    ALT 8 07/10/2023     Lab Results   Component Value Date    MG 1.8 05/22/2023    FREET4 1.17 08/21/2023    TSH 2.540 08/21/2023     Labs personally reviewed.     CT neck personally reviewed and no evidence of disease.     PET scan personally reviewed and per my independent read without evidence of disease.     NM PET/CT Skull Base to Mid Thigh    Result Date: 8/22/2023    1. No convincing PET-CT signs of disease recurrence or progressive metastatic disease.  Resolved previously noted hypermetabolic right oropharyngeal mass. 2. Mild right middle lobe airspace opacities with associated FDG uptake.  Mild uptake noted within small right hilar and mediastinal nodes unchanged in size from prior exam.  Findings favor nonspecific infectious/inflammatory process with reactive lymph nodes.  Recommend attention on follow-up imaging. 3. Nonfocal FDG uptake throughout the esophagus, correlate for signs  of esophagitis.     JORGE IRIZARRY MD       Electronically Signed and Approved By: JORGE IRIZARRY MD on 8/22/2023 at 9:58                  Assessment and Plan    Diagnoses and all orders for this visit:    1. Primary squamous cell carcinoma of base of tongue (Primary)  -     Comprehensive Metabolic Panel; Future  -     US Head Neck Soft Tissue; Future    2. Anemia associated with chemotherapy    Ms. Serna has biopsy proven HPV negative squamous cell carcinoma of right base of tongue.  Per CT imaging with contrast that has been obtained she has possible right-sided level 2 lymph node measuring 0.7 cm.  3/6/23 PET scan confirmed FDG avidity of lymph nodes involved.  Patient has had plate placed in jaw so that she is able to start treatment.  Concurrent chemoradiation with curative intent. Treatment started on 4/10/23.  C2 chemo Carbo/Taxol (Switched due to Cisplatin shortage) 4/17/23. Complicated by reaction to Taxol. Carboplatin received. Will avoid further Taxol. C3 chemo with Carbo/96h infusional 5-FU 4/23/23. C4 switched back to weekly Cisplatin, given 5/15/23.  C5 chemo weekly Cisplatin 5/22/23, which was last dose of chemotherapy.  XRT completed 5/25/23.     Repeat CT neck with no evidence of disease.  Repeat PET 3 months post-treatment 8/21/23 with YAMILKA.     Follow up surveillance should include head/neck, mirror and fiberoptic exam every 1-3 month during year 1, every 2-6 month year 2 and every 4-8 month year 3-5. TSH every 6 to 12 months (TSH wnl 8/21/23). Norm surveillance with neck ultrasound in 4 months. Plan for 12 month post-treatment PET/CT.        Anemia secondary to chemotherapy  Repeat CBC 8/21/23 with improving anemia. Iron deficiency ruled out with elevated ferritin.        This is an acute or chronic illness that poses a threat to life or bodily function. The above treatment plan involves a high risk of complications and/or mortality of patient management. Continue with labs to monitor for  severe toxicities.     I spent 25 minutes caring for Carina on this date of service. This time includes time spent by me in the following activities:preparing for the visit, reviewing tests, obtaining and/or reviewing a separately obtained history, performing a medically appropriate examination and/or evaluation , counseling and educating the patient/family/caregiver, ordering medications, tests, or procedures, referring and communicating with other health care professionals , documenting information in the medical record, independently interpreting results and communicating that information with the patient/family/caregiver and care coordination    Patient Follow Up: 4 months with neck ultrasound  Patient was given instructions and counseling regarding her condition or for health maintenance advice. Please see specific information pulled into the AVS if appropriate.

## 2023-08-24 ENCOUNTER — OFFICE VISIT (OUTPATIENT)
Dept: RADIATION ONCOLOGY | Facility: HOSPITAL | Age: 63
End: 2023-08-24
Payer: MEDICAID

## 2023-08-24 VITALS
SYSTOLIC BLOOD PRESSURE: 133 MMHG | WEIGHT: 100.09 LBS | OXYGEN SATURATION: 97 % | BODY MASS INDEX: 17.87 KG/M2 | HEART RATE: 85 BPM | DIASTOLIC BLOOD PRESSURE: 82 MMHG | TEMPERATURE: 98 F | RESPIRATION RATE: 16 BRPM

## 2023-08-24 DIAGNOSIS — C01 PRIMARY SQUAMOUS CELL CARCINOMA OF BASE OF TONGUE: Primary | ICD-10-CM

## 2023-08-24 PROCEDURE — G0463 HOSPITAL OUTPT CLINIC VISIT: HCPCS | Performed by: RADIOLOGY

## 2023-08-24 NOTE — PROGRESS NOTES
Follow Up Office Visit      Encounter Date: 08/24/2023   Patient Name: Carina Serna  YOB: 1960   Medical Record Number: [id number]   Primary Diagnosis: Primary squamous cell carcinoma of base of tongue [C01]     Completion Date: 5/26/2023    Chief Complaint:    Chief Complaint   Patient presents with    Follow-up       History of Present Illness: Carina Serna returns for follow up after PET/CT on 8/21/2023.  This reveals no evidence of disease recurrence or progressive metastatic disease.  Ms. Serna reports feeling well overall but she is still using her enteral feeding tube given relative inability to chew food as she is edentulous.  She plans to be evaluated for dentures in the coming weeks.  She denies pain of any kind and additionally denies dysphagia.    Subjective      Review of Systems: Review of Systems   Constitutional:  Positive for appetite change (Able to eat soft foods and soups, 3 cans of nutrition/day/ peg tube) and unexpected weight change (3 lb weight loss since last visit). Negative for fatigue.   HENT:  Positive for sore throat (states that it gets sore after eating). Negative for hearing loss, tinnitus, trouble swallowing and voice change.         Dry mouth   Thick, sticky secretions  Altered taste, improving  States is only able to eat liquid/soft diet due to not having teeth and her gums do not line up to wear she can chew normal food.   C/o layer on tongue   C/o mouth getting sore after eating      Eyes:  Negative for visual disturbance (Wear glasses).   Respiratory:  Negative for cough and shortness of breath.    Cardiovascular:  Negative for chest pain, palpitations and leg swelling.   Gastrointestinal:  Negative for anal bleeding, blood in stool, constipation, diarrhea, nausea and vomiting.   Genitourinary:  Negative for difficulty urinating, dysuria, frequency, hematuria and urgency.   Musculoskeletal:  Positive for back pain (Ongoing). Negative for  arthralgias, gait problem, joint swelling, neck pain and neck stiffness.   Skin:  Negative for color change and rash.   Neurological:  Negative for dizziness, weakness and headaches.   Psychiatric/Behavioral:  Positive for sleep disturbance (Trouble falling and staying asleep, ongoing).      The following portions of the patient's history were reviewed and updated as appropriate: allergies, current medications, past family history, past medical history, past social history, past surgical history and problem list.    Medications:     Current Outpatient Medications:     ondansetron (ZOFRAN) 8 MG tablet, Take 1 tablet by mouth Every 8 (Eight) Hours As Needed for Nausea or Vomiting. (Patient not taking: Reported on 6/26/2023), Disp: 30 tablet, Rfl: 2    prochlorperazine (COMPAZINE) 10 MG tablet, Take 1 tablet by mouth Every 6 (Six) Hours As Needed for Nausea or Vomiting. (Patient not taking: Reported on 6/26/2023), Disp: 30 tablet, Rfl: 1    Allergies:   Allergies   Allergen Reactions    Paclitaxel Anaphylaxis       ECOG: (1) Restricted in physically strenuous activity, ambulatory and able to do work of light nature  Quality of Life: 80 - Restricted Physical Activity     Objective     Physical Exam:   Vital Signs:   Vitals:    08/24/23 0842   BP: 133/82   Pulse: 85   Resp: 16   Temp: 98 øF (36.7 øC)   TempSrc: Temporal   SpO2: 97%   Weight: 45.4 kg (100 lb 1.4 oz)   PainSc: 0-No pain     Body mass index is 17.87 kg/mý.     Physical Exam  Constitutional:       General: She is not in acute distress.     Appearance: She is not ill-appearing, toxic-appearing or diaphoretic.   HENT:      Head: Normocephalic and atraumatic.      Mouth/Throat:      Mouth: Mucous membranes are moist.      Pharynx: Oropharynx is clear.   Neck:      Comments: Moderate submental lymphedema  Lymphadenopathy:      Cervical: No cervical adenopathy.   Skin:     General: Skin is warm and dry.   Neurological:      General: No focal deficit present.       Mental Status: She is alert and oriented to person, place, and time. Mental status is at baseline.      Cranial Nerves: No cranial nerve deficit.      Gait: Gait normal.   Psychiatric:         Mood and Affect: Mood normal.         Behavior: Behavior normal.         Judgment: Judgment normal.     Radiology: I personally reviewed the PET/CT performed on 8/21/2023.  The pertinent findings are as above in HPI.        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  She has no clinical or radiographic evidence of distant metastatic disease.  She is now status post chemoradiation with interval clinical response and radiographic response.  She is recovering quite well from the acute toxicities of radiotherapy.  ECOG 1    I will see Ms. Serna in follow-up in 6 weeks after she undergoes PET/CT.  We discussed the follow-up/surveillance strategy for head neck cancer.  She will be evaluated by GRACY Herring in 1 month regarding her ability to undergo enteral feeding tube removal.  I will see her in follow-up in 3 months.  She was encouraged to contact me with any questions or concerns regarding her care.  She was encouraged to contact me in the interim with any questions or concerns regarding her care.      Nikolai Weiner MD  Radiation Oncology  [facility]    This document has been signed by Nikolai Weiner MD on August 24, 2023 10:22 EDT

## 2023-11-27 ENCOUNTER — OFFICE VISIT (OUTPATIENT)
Dept: RADIATION ONCOLOGY | Facility: HOSPITAL | Age: 63
End: 2023-11-27
Payer: MEDICAID

## 2023-11-27 VITALS
DIASTOLIC BLOOD PRESSURE: 94 MMHG | WEIGHT: 88.18 LBS | OXYGEN SATURATION: 98 % | RESPIRATION RATE: 16 BRPM | TEMPERATURE: 98.1 F | BODY MASS INDEX: 15.74 KG/M2 | SYSTOLIC BLOOD PRESSURE: 150 MMHG | HEART RATE: 82 BPM

## 2023-11-27 DIAGNOSIS — C01 PRIMARY SQUAMOUS CELL CARCINOMA OF BASE OF TONGUE: Primary | ICD-10-CM

## 2023-11-27 PROCEDURE — G0463 HOSPITAL OUTPT CLINIC VISIT: HCPCS | Performed by: RADIOLOGY

## 2023-11-27 RX ORDER — MEGESTROL ACETATE 40 MG/ML
SUSPENSION ORAL
COMMUNITY
Start: 2023-10-13

## 2023-11-27 RX ORDER — ZOLPIDEM TARTRATE 5 MG/1
TABLET ORAL
COMMUNITY
Start: 2023-10-27

## 2023-11-27 RX ORDER — OLANZAPINE 2.5 MG/1
TABLET, FILM COATED ORAL
COMMUNITY
Start: 2023-11-14

## 2023-11-27 NOTE — PROGRESS NOTES
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  She reports feeling well overall and continues to experience some xerostomia and altered taste.  She is maintaining her nutrition exclusively by mouth and does not use her enteral feeding tube.  Additionally, she states that she will no longer use it.    Subjective      Review of Systems: Review of Systems   Constitutional:  Positive for appetite change (Decreased, states pcp placed on megace, appetite started to improve but then got the stomach flu.  States appetite is coming back.) and unexpected weight change (Down 22lbs since last visit.). Negative for fatigue.        Has peg tube in place, not being used.  Patient is flushing daily.  Patient states, \"I am just done with it.\"   HENT:  Negative for ear pain, hearing loss, sore throat, tinnitus, trouble swallowing and voice change.         Dry mouth   Thick, sticky secretions  Altered taste, comes and goes  States is only able to eat liquid/soft diet due to not having teeth and her gums do not line up to wear she can chew normal food.   C/o layer on tongue using biotine with some relief.         Eyes:  Negative for visual disturbance (Wear glasses).   Respiratory:  Negative for cough, chest tightness, shortness of breath and wheezing.    Cardiovascular:  Negative for chest pain, palpitations and leg swelling.   Gastrointestinal:  Negative for anal bleeding, blood in stool, constipation, diarrhea, nausea and vomiting.   Genitourinary:  Negative for difficulty urinating, dysuria, frequency, hematuria and urgency.   Musculoskeletal:  " Positive for back pain (Ongoing). Negative for arthralgias, gait problem, joint swelling, neck pain and neck stiffness.   Skin:  Negative for color change and rash.   Neurological:  Negative for dizziness, weakness and headaches.   Psychiatric/Behavioral:  Positive for sleep disturbance (Trouble falling and staying asleep, ongoing).        The following portions of the patient's history were reviewed and updated as appropriate: allergies, current medications, past family history, past medical history, past social history, past surgical history and problem list.    Medications:     Current Outpatient Medications:     OLANZapine (zyPREXA) 2.5 MG tablet, , Disp: , Rfl:     megestrol (MEGACE) 40 MG/ML suspension, SHAKE LIQUID AND TAKE 5 ML BY MOUTH DAILY (Patient not taking: Reported on 11/27/2023), Disp: , Rfl:     ondansetron (ZOFRAN) 8 MG tablet, Take 1 tablet by mouth Every 8 (Eight) Hours As Needed for Nausea or Vomiting. (Patient not taking: Reported on 6/26/2023), Disp: 30 tablet, Rfl: 2    prochlorperazine (COMPAZINE) 10 MG tablet, Take 1 tablet by mouth Every 6 (Six) Hours As Needed for Nausea or Vomiting. (Patient not taking: Reported on 6/26/2023), Disp: 30 tablet, Rfl: 1    zolpidem (AMBIEN) 5 MG tablet, TAKE 1 TABLET BY MOUTH EVERY DAY AT BEDTIME FOR 30 DAYS AS NEEDED INSOMNIA (Patient not taking: Reported on 11/27/2023), Disp: , Rfl:     Allergies:   Allergies   Allergen Reactions    Paclitaxel Anaphylaxis       ECOG: (1) Restricted in physically strenuous activity, ambulatory and able to do work of light nature  Quality of Life: 80 - Restricted Physical Activity     Objective     Physical Exam:   Vital Signs:   Vitals:    11/27/23 1110   BP: 150/94   Pulse: 82   Resp: 16   Temp: 98.1 °F (36.7 °C)   TempSrc: Temporal   SpO2: 98%   Weight: 40 kg (88 lb 2.9 oz)   PainSc: 0-No pain     Body mass index is 15.74 kg/m².     Physical Exam  Constitutional:       General: She is not in acute distress.      Appearance: She is not ill-appearing, toxic-appearing or diaphoretic.      Comments: Thin   HENT:      Head: Normocephalic and atraumatic.      Mouth/Throat:      Mouth: Mucous membranes are moist.      Pharynx: Oropharynx is clear.   Neck:      Comments: Moderate submental lymphedema  Lymphadenopathy:      Cervical: No cervical adenopathy.   Skin:     General: Skin is warm and dry.   Neurological:      General: No focal deficit present.      Mental Status: She is alert and oriented to person, place, and time. Mental status is at baseline.      Cranial Nerves: No cranial nerve deficit.      Gait: Gait normal.   Psychiatric:         Mood and Affect: Mood normal.         Behavior: Behavior normal.         Judgment: Judgment normal.             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  She has no clinical or radiographic evidence of distant metastatic disease.  She is now status post chemoradiation with interval clinical response and radiographic response.  She is recovering quite well from the acute toxicities of radiotherapy.  ECOG 1    I will see Ms. Serna in follow-up in 3 months.  I will refer her to Dr. Ellington for PEG tube removal.  She was encouraged to contact me with any questions or concerns regarding her care.      Nikolai Weiner MD  Radiation Oncology  [facility]    This document has been signed by Nikolai Weiner MD on November 27, 2023 15:32 EST

## 2023-11-28 ENCOUNTER — TELEPHONE (OUTPATIENT)
Dept: NUTRITION | Facility: HOSPITAL | Age: 63
End: 2023-11-28
Payer: MEDICAID

## 2023-11-28 NOTE — PROGRESS NOTES
"Outpatient Nutrition Oncology Follow Up    Patient Name: Carina Serna  YOB: 1960  MRN: [id number]    Recommendation(s):   Small, frequent meals of high kcal / high protein foods to promote wt gain.  High kcal / high protein ONS 2-3 X day.  Adequate fluids.    Reason for Consult:  Nutrition referral (significant wt decline)       11/27/23 Weight:   40 kg    Weight Change:  20% wt decline in 6 months (significant)    Nutrition-related concerns: Anorexia, Taste Alterations, Xerostomia, and Other: chewing issues    Current PO intake:  soft or tender foods (can eat tender meats and reports to be eating a variety of foods)     Current Nutrition Supplement intake:  plans to start drinking Ensure Plus, Boost Plus or [place] Essentials mixed with whole milk 2-3 X day    Current EN RX:  only flushing PEG with water, not using for nutrition    Consult or Intervention:  Spoke to pt via telephone.  Nutrition referral received due to ongoing significant wt decline.  She has multiple nutrition-related issues, including decreased appetite.  Despite this pt reported to Radiation Oncologist that she does not wish to use her feeding tube again.  She is using Biotene for thick secretions in her mouth / tongue.  Dentures are not lining up anymore therefore experiencing some chewing concerns. Pt doesn't feel it is a major issue since she can still chew most soft or tender foods and meats.  Megace was ordered by her PCP 10/13/23 and then Zyprexa added 11/14/23.  She reports her appetite has improved slightly with the medications.  She noted that she was gaining wt, but then contracted a GI virus that set her back.  She plans to start drinking some form of oral nutrition shakes.  Suggested \"Plus\" versions of ONS or [place] Essentials mixed with whole milk.  Pt was agreeable to receive Ensure coupons in the mail.    Nutrition Diagnosis: Unintentional weight loss related to Inability to consume sufficient energy as " evidenced by physiological causes increasing nutrient needs., hypermetabolic state., decreased appetite., patient report., and 20% wt decline in 6 months.    Plan: Will follow up per oncology nutrition protocol

## 2023-12-13 ENCOUNTER — TELEPHONE (OUTPATIENT)
Dept: RADIATION ONCOLOGY | Facility: HOSPITAL | Age: 63
End: 2023-12-13
Payer: MEDICAID

## 2023-12-13 DIAGNOSIS — C13.9 SQUAMOUS CELL CARCINOMA OF HYPOPHARYNX: ICD-10-CM

## 2023-12-13 DIAGNOSIS — C13.8 SQUAMOUS CELL CARCINOMA OF OVERLAPPING SITES OF HYPOPHARYNX: ICD-10-CM

## 2023-12-13 DIAGNOSIS — C01 PRIMARY SQUAMOUS CELL CARCINOMA OF BASE OF TONGUE: Primary | ICD-10-CM

## 2023-12-13 DIAGNOSIS — C01 MALIGNANT NEOPLASM OF BASE OF TONGUE: ICD-10-CM

## 2023-12-18 ENCOUNTER — OFFICE VISIT (OUTPATIENT)
Dept: SURGERY | Facility: CLINIC | Age: 63
End: 2023-12-18
Payer: MEDICAID

## 2023-12-18 ENCOUNTER — PREP FOR SURGERY (OUTPATIENT)
Dept: OTHER | Facility: HOSPITAL | Age: 63
End: 2023-12-18
Payer: MEDICAID

## 2023-12-18 VITALS
HEART RATE: 109 BPM | BODY MASS INDEX: 15.24 KG/M2 | HEIGHT: 63 IN | WEIGHT: 86 LBS | SYSTOLIC BLOOD PRESSURE: 146 MMHG | DIASTOLIC BLOOD PRESSURE: 80 MMHG

## 2023-12-18 DIAGNOSIS — Z95.828 PORT-A-CATH IN PLACE: ICD-10-CM

## 2023-12-18 DIAGNOSIS — Z93.1 GASTROSTOMY TUBE IN PLACE: Primary | ICD-10-CM

## 2023-12-18 PROCEDURE — 1160F RVW MEDS BY RX/DR IN RCRD: CPT | Performed by: SURGERY

## 2023-12-18 PROCEDURE — 99212 OFFICE O/P EST SF 10 MIN: CPT | Performed by: SURGERY

## 2023-12-18 PROCEDURE — 1159F MED LIST DOCD IN RCRD: CPT | Performed by: SURGERY

## 2023-12-18 RX ORDER — TRAZODONE HYDROCHLORIDE 50 MG/1
TABLET ORAL
COMMUNITY
Start: 2023-11-29

## 2023-12-18 RX ORDER — OLANZAPINE 5 MG/1
TABLET ORAL
COMMUNITY
Start: 2023-12-04

## 2023-12-18 RX ORDER — ESCITALOPRAM OXALATE 10 MG/1
TABLET ORAL
COMMUNITY
Start: 2023-11-29

## 2023-12-18 NOTE — H&P (VIEW-ONLY)
Chief Complaint:  Peg tube removal f/up    Primary Care Provider: Jayna Ibarra APRN    Referring Provider: Nikolai Weiner MD    History of Present Illness  Carina Serna is a 63 y.o. female referred by Nikolai Weiner MD.  Patient has a gastrostomy tube that was used for tongue cancer treatment.  The tube is no longer needed and the patient wants to have it removed.  Also, the patient has a portacatheter in place.  She says she is not going to receive any more chemotherapy even if it is recommended that she wants to have the port removed as well.    Allergies: Paclitaxel    Home medicines:  None    Past Medical History:    Dental disease    PONV (postoperative nausea and vomiting)    Primary squamous cell carcinoma of base of tongue    NEWLY DIAGNOSED AND NO TYPE OF TREATMENT DONE THUS FAR 2/27/23    Sore throat        Past Surgical History:    BLADDER SURGERY    DIRECT LARYNGOSCOPY, ESOPHAGOSCOPY, BRONCHOSCOPY    Procedure: DIRECT LARYNGOSCOPY, ESOPHAGOSCOPY, BRONCHOSCOPY;  Surgeon: Migel Escobar MD;  Location: [facility];  Service: ENT;  Laterality: N/A;    EYE SURGERY    CATARACT REMOVED BILATERALLY    HYSTERECTOMY    PEG TUBE INSERTION    Procedure: PERCUTANEOUS ENDOSCOPIC GASTROSTOMY TUBE INSERTION;  Surgeon: Frank Ellington MD;  Location: [facility] OR;  Service: General;  Laterality: N/A;    TONSILLECTOMY    VENOUS ACCESS DEVICE (PORT) INSERTION    Procedure: INSERTION OF PORTACATH ,insertion of percutaneoous endoscopic gastrostomy tube;  Surgeon: Frank Ellington MD;  Location: [facility] OR;  Service: General;  Laterality: N/A;       Family History:   Family History   Problem Relation Age of Onset    Cancer Mother         Colon cancer    Heart attack Father     Colon cancer Maternal Aunt     Malig Hyperthermia Neg Hx         Social History:  Social History     Tobacco Use    Smoking status: Former     Packs/day: 1.00     Years: 30.00     Additional pack years: 0.00     Total pack  "years: 30.00     Types: Cigarettes     Quit date: 2023     Years since quittin.8    Smokeless tobacco: Never   Substance Use Topics    Alcohol use: Not Currently       Objective     Vital Signs:  /80 (BP Location: Right arm, Patient Position: Sitting, Cuff Size: Adult)   Pulse 109   Ht 159.4 cm (62.76\")   Wt 39 kg (86 lb)   BMI 15.35 kg/m²   Respiratory:  breathing not labored, respiratory effort appears normal  Cardiovascular:  heart regular rate  Abdomen:  soft, nondistended, gastrostomy tube exiting abdominal wall at upper abdomen.  Skin:  Port palpable at rigth upper chest  Musculoskeletal: moving all extremities symmetrically and purposefully  Neurologic:  no obvious motor or sensory deficits  Psychiatric:  judgment and insight intact      Assessment:  Port-a-catheter in place  Gastrostomy tube in place    Plan:  Port-a-catheter removal  Percutaneous endoscopic gastrostomy tube removal      Discussion: Indications, options, risks, benefits, and expected outcomes of planned surgery were discussed with the patient and she agrees to proceed.    Frank Ellington MD  2023    Electronically signed by Frank Ellington MD, 23, 1:17 PM EST.        "

## 2023-12-18 NOTE — PROGRESS NOTES
Chief Complaint:  Peg tube removal f/up    Primary Care Provider: Jayna Ibarra APRN    Referring Provider: Nikolai Weiner MD    History of Present Illness  Carina Serna is a 63 y.o. female referred by Nikolai Weiner MD.  Patient has a gastrostomy tube that was used for tongue cancer treatment.  The tube is no longer needed and the patient wants to have it removed.  Also, the patient has a portacatheter in place.  She says she is not going to receive any more chemotherapy even if it is recommended that she wants to have the port removed as well.    Allergies: Paclitaxel    Home medicines:  None    Past Medical History:    Dental disease    PONV (postoperative nausea and vomiting)    Primary squamous cell carcinoma of base of tongue    NEWLY DIAGNOSED AND NO TYPE OF TREATMENT DONE THUS FAR 2/27/23    Sore throat        Past Surgical History:    BLADDER SURGERY    DIRECT LARYNGOSCOPY, ESOPHAGOSCOPY, BRONCHOSCOPY    Procedure: DIRECT LARYNGOSCOPY, ESOPHAGOSCOPY, BRONCHOSCOPY;  Surgeon: Migel Escobar MD;  Location: [facility];  Service: ENT;  Laterality: N/A;    EYE SURGERY    CATARACT REMOVED BILATERALLY    HYSTERECTOMY    PEG TUBE INSERTION    Procedure: PERCUTANEOUS ENDOSCOPIC GASTROSTOMY TUBE INSERTION;  Surgeon: Frank Ellington MD;  Location: [facility] OR;  Service: General;  Laterality: N/A;    TONSILLECTOMY    VENOUS ACCESS DEVICE (PORT) INSERTION    Procedure: INSERTION OF PORTACATH ,insertion of percutaneoous endoscopic gastrostomy tube;  Surgeon: Frank Ellington MD;  Location: [facility] OR;  Service: General;  Laterality: N/A;       Family History:   Family History   Problem Relation Age of Onset    Cancer Mother         Colon cancer    Heart attack Father     Colon cancer Maternal Aunt     Malig Hyperthermia Neg Hx         Social History:  Social History     Tobacco Use    Smoking status: Former     Packs/day: 1.00     Years: 30.00     Additional pack years: 0.00     Total pack  "years: 30.00     Types: Cigarettes     Quit date: 2023     Years since quittin.8    Smokeless tobacco: Never   Substance Use Topics    Alcohol use: Not Currently       Objective     Vital Signs:  /80 (BP Location: Right arm, Patient Position: Sitting, Cuff Size: Adult)   Pulse 109   Ht 159.4 cm (62.76\")   Wt 39 kg (86 lb)   BMI 15.35 kg/m²   Respiratory:  breathing not labored, respiratory effort appears normal  Cardiovascular:  heart regular rate  Abdomen:  soft, nondistended, gastrostomy tube exiting abdominal wall at upper abdomen.  Skin:  Port palpable at rigth upper chest  Musculoskeletal: moving all extremities symmetrically and purposefully  Neurologic:  no obvious motor or sensory deficits  Psychiatric:  judgment and insight intact      Assessment:  Port-a-catheter in place  Gastrostomy tube in place    Plan:  Port-a-catheter removal  Percutaneous endoscopic gastrostomy tube removal      Discussion: Indications, options, risks, benefits, and expected outcomes of planned surgery were discussed with the patient and she agrees to proceed.    Frank Ellington MD  2023    Electronically signed by Frank Ellington MD, 23, 1:17 PM EST.        "

## 2024-01-04 NOTE — PRE-PROCEDURE INSTRUCTIONS
IMPORTANT INSTRUCTIONS - PRE-ADMISSION TESTING  DO NOT EAT OR CHEW anything after midnight the night before your procedure.    You may have CLEAR liquids up to 2___ hours prior to ARRIVAL time.   Take the following medications the morning of your procedure with JUST A SIP OF WATER:  ___nO MEDS _________    DO NOT BRING your medications to the hospital with you, UNLESS something has changed since your PRE-Admission Testing appointment.  Hold all vitamins, supplements, and NSAIDS (Non- steroidal anti-inflammatory meds) for one week prior to surgery (you MAY take Tylenol or Acetaminophen).  If you are diabetic, check your blood sugar the morning of your procedure. If it is less than 70 or if you are feeling symptomatic, call the following number for further instructions: [phone]  Use your inhalers/nebulizers as usual, the morning of your procedure. BRING YOUR INHALERS with you.   Bring your CPAP or BIPAP to hospital, ONLY IF YOU WILL BE SPENDING THE NIGHT.   Make sure you have a ride home and have someone who will stay with you the day of your procedure after you go home.  If you have any questions, please call your Pre-Admission Testing Nurse, DAMARIS_____________ at [phone]__.   Per anesthesia request, do not smoke for 24 hours before your procedure or as instructed by your surgeon.     Clear Liquid Diet        Find out when you need to start a clear liquid diet.   Think of “clear liquids” as anything you could read a newspaper through. This includes things like water, broth, sports drinks, or tea WITHOUT any kind of milk or cream.           Once you are told to start a clear liquid diet, only drink these things until 2 hours before arrival to the hospital or when the hospital says to stop. Total volume limitation: 8 oz.       Clear liquids you CAN drink:   Water   Clear broth: beef, chicken, vegetable, or bone broth with nothing in it   Gatorade   Lemonade or Ramiro-aid   Soda   Tea, coffee (NO  cream or honey)   Jell-O (without fruit)   Popsicles (without fruit or cream)   Italian ices   Juice without pulp: apple, white, grape   You may use salt, pepper, and sugar    Do NOT drink:   Milk or cream   Soy milk, almond milk, coconut milk, or other non-dairy drinks and   creamers   Milkshakes or smoothies   Tomato juice   Orange juice   Grapefruit juice   Cream soups or any other than broth         Clear Liquid Diet:  Do NOT eat any solid food.  Do NOT eat or suck on mints or candy.  Do NOT chew gum.  Do NOT drink thick liquids like milk or juice with pulp in it.  Do NOT add milk, cream, or anything like soy milk or almond milk to coffee or tea.      PREOPERATIVE (BEFORE SURGERY)              BATHING INSTRUCTIONS  Instructions:    You will need to shower 1 Time utilizing the soap provided; at the times indicated   below:  MORNING OF SURGERY 1/8/24       Wash your hair and face with normal shampoo and soap, rinse it well before using the surgical soap.      In the shower, wet the skin completely with water from your neck to your feet. Apply the cleanser to your   body ONLY FROM THE NECK TO YOUR FEET.     Do NOT USE THE CLEANSER ON YOUR FACE, HEAD, OR GENITAL (PRIVATE) AREAS.   Keep it out of your eyes, ears, and mouth because of the risk of injury to those areas.      Scrub with a clean washcloth for each bath utilizing the soap provided from the top of your body to the   bottom starting at the neck area.      Pay close attention to your armpits, groin area, and the site of surgery.      Wash your body gently for 5 minutes. Stand outside the stream or turn off the water while scrubbing your   body. Do NOT wash with your regular soap after the surgical cleanser is used.      RINSE THE CLEANSER OFF COMPLETELY with plenty of water. Rinse the area again thoroughly.      Dry off with a clean towel. The surgical soap can cause dryness; however do NOT APPLY LOTION,   CREAM, POWDER, and/or DEODORANT AFTER SHOWERING.      Be sure to where clean clothes after showering.      Ensure CLEAN BED LINENS AFTER FIRST wash with the surgical soap.      NO PETS ALLOWED IN THE BED with you after utilizing the surgical soap.

## 2024-01-08 ENCOUNTER — HOSPITAL ENCOUNTER (OUTPATIENT)
Facility: HOSPITAL | Age: 64
Setting detail: HOSPITAL OUTPATIENT SURGERY
Discharge: HOME | End: 2024-01-08
Attending: SURGERY | Admitting: SURGERY
Payer: MEDICAID

## 2024-01-08 ENCOUNTER — ANESTHESIA EVENT (OUTPATIENT)
Dept: PERIOP | Facility: HOSPITAL | Age: 64
End: 2024-01-08
Payer: MEDICAID

## 2024-01-08 ENCOUNTER — ANESTHESIA (OUTPATIENT)
Dept: PERIOP | Facility: HOSPITAL | Age: 64
End: 2024-01-08
Payer: MEDICAID

## 2024-01-08 VITALS
TEMPERATURE: 97.7 F | HEIGHT: 65 IN | SYSTOLIC BLOOD PRESSURE: 108 MMHG | OXYGEN SATURATION: 97 % | HEART RATE: 68 BPM | RESPIRATION RATE: 17 BRPM | WEIGHT: 83.78 LBS | BODY MASS INDEX: 13.96 KG/M2 | DIASTOLIC BLOOD PRESSURE: 70 MMHG

## 2024-01-08 DIAGNOSIS — Z95.828 PORT-A-CATH IN PLACE: Primary | ICD-10-CM

## 2024-01-08 DIAGNOSIS — Z93.1 GASTROSTOMY TUBE IN PLACE: ICD-10-CM

## 2024-01-08 PROCEDURE — 25010000002 PROCHLORPERAZINE 10 MG/2ML SOLUTION: Performed by: ANESTHESIOLOGY

## 2024-01-08 PROCEDURE — 25010000002 BUPIVACAINE (PF) 0.25 % SOLUTION: Performed by: SURGERY

## 2024-01-08 PROCEDURE — 25010000002 MIDAZOLAM PER 1MG: Performed by: ANESTHESIOLOGY

## 2024-01-08 PROCEDURE — 25010000002 PROPOFOL 10 MG/ML EMULSION: Performed by: ANESTHESIOLOGY

## 2024-01-08 PROCEDURE — 36590 REMOVAL TUNNELED CV CATH: CPT | Performed by: SURGERY

## 2024-01-08 PROCEDURE — 25810000003 LACTATED RINGERS PER 1000 ML: Performed by: ANESTHESIOLOGY

## 2024-01-08 RX ORDER — ACETAMINOPHEN 500 MG
500 TABLET ORAL ONCE
Status: COMPLETED | OUTPATIENT
Start: 2024-01-08 | End: 2024-01-08

## 2024-01-08 RX ORDER — PROCHLORPERAZINE EDISYLATE 5 MG/ML
5 INJECTION INTRAMUSCULAR; INTRAVENOUS ONCE
Status: COMPLETED | OUTPATIENT
Start: 2024-01-08 | End: 2024-01-08

## 2024-01-08 RX ORDER — DEXMEDETOMIDINE HYDROCHLORIDE 100 UG/ML
INJECTION, SOLUTION INTRAVENOUS AS NEEDED
Status: DISCONTINUED | OUTPATIENT
Start: 2024-01-08 | End: 2024-01-08 | Stop reason: SURG

## 2024-01-08 RX ORDER — MIDAZOLAM HYDROCHLORIDE 2 MG/2ML
1 INJECTION, SOLUTION INTRAMUSCULAR; INTRAVENOUS ONCE
Status: COMPLETED | OUTPATIENT
Start: 2024-01-08 | End: 2024-01-08

## 2024-01-08 RX ORDER — BUPIVACAINE HYDROCHLORIDE 2.5 MG/ML
INJECTION, SOLUTION EPIDURAL; INFILTRATION; INTRACAUDAL AS NEEDED
Status: DISCONTINUED | OUTPATIENT
Start: 2024-01-08 | End: 2024-01-08 | Stop reason: HOSPADM

## 2024-01-08 RX ORDER — MAGNESIUM HYDROXIDE 1200 MG/15ML
LIQUID ORAL AS NEEDED
Status: DISCONTINUED | OUTPATIENT
Start: 2024-01-08 | End: 2024-01-08 | Stop reason: HOSPADM

## 2024-01-08 RX ORDER — SODIUM CHLORIDE, SODIUM LACTATE, POTASSIUM CHLORIDE, CALCIUM CHLORIDE 600; 310; 30; 20 MG/100ML; MG/100ML; MG/100ML; MG/100ML
9 INJECTION, SOLUTION INTRAVENOUS CONTINUOUS PRN
Status: DISCONTINUED | OUTPATIENT
Start: 2024-01-08 | End: 2024-01-08 | Stop reason: HOSPADM

## 2024-01-08 RX ORDER — HYDROCODONE BITARTRATE AND ACETAMINOPHEN 5; 325 MG/1; MG/1
1 TABLET ORAL EVERY 6 HOURS PRN
Qty: 5 TABLET | Refills: 0 | Status: SHIPPED | OUTPATIENT
Start: 2024-01-08

## 2024-01-08 RX ADMIN — PROPOFOL 150 MCG/KG/MIN: 10 INJECTION, EMULSION INTRAVENOUS at 11:13

## 2024-01-08 RX ADMIN — MIDAZOLAM HYDROCHLORIDE 1 MG: 1 INJECTION, SOLUTION INTRAMUSCULAR; INTRAVENOUS at 10:27

## 2024-01-08 RX ADMIN — PROCHLORPERAZINE EDISYLATE 5 MG: 5 INJECTION, SOLUTION INTRAMUSCULAR; INTRAVENOUS at 10:07

## 2024-01-08 RX ADMIN — SODIUM CHLORIDE, POTASSIUM CHLORIDE, SODIUM LACTATE AND CALCIUM CHLORIDE 9 ML/HR: 600; 310; 30; 20 INJECTION, SOLUTION INTRAVENOUS at 10:08

## 2024-01-08 RX ADMIN — DEXMEDETOMIDINE HYDROCHLORIDE 20 MCG: 100 INJECTION, SOLUTION, CONCENTRATE INTRAVENOUS at 11:22

## 2024-01-08 RX ADMIN — ACETAMINOPHEN 500 MG: 500 TABLET ORAL at 10:06

## 2024-01-08 NOTE — OP NOTE
Operative Report    Patient Name:  Carina Serna  YOB: 1960    Date of Surgery:  1/8/2024     Pre-op Diagnosis:   Gastrostomy tube in place [Z93.1]  Port-A-Cath in place [Z95.828]       Post-op Diagnosis:   Post-Op Diagnosis Codes:     * Gastrostomy tube in place [Z93.1]     * Port-A-Cath in place [Z95.828]    Procedures:  Port-a-catheter removal   Percutaneous endoscopic gastrostomy tube removal    Staff:  Surgeon(s):  Frank Ellington MD    Anesthesia: Monitored Anesthesia Care    Estimated Blood Loss: minimal    Complications:  None    Drains:  None    Packing:  None    Implants:  None    Specimen:  None    Indications: 63 y.o. female who has a venous access device and gastrostomy tube in place.  The venous access device and gastrostomy tube are no longer needed and the patient wants to have them removed.     Findings:  Gastrostomy tube and Port with attached catheter removed.      Description of Procedure:  The patient was taken to the operating room and placed supine on the operative table.  Timeout was performed.  MAC anesthesia was administered.  Traction was placed on the gastrostomy tube and it removed was without difficulty.  Dressing was placed.  Patient was then prepped and draped in the usual fashion for port removal.  Local anesthesia was infiltrated into the skin and subcutaneous tissue where the port is located.  An incision was made through the old surgical scar and deepened into the subcutaneous tissue.  The port and attached catheter were identified and dissected free from the chest wall tissue and removed.  The catheter was examined and it was completely intact.  There was adequate hemostasis.  The wound was closed with buried absorbable suture followed by appropriate dressings.  The patient did well and was transported to the recovery area in stable condition.  Sponge, needle, and instrument counts were correct.        Frank Ellington MD     Date: 1/8/2024  Time: 11:37  EST    Electronically signed by Frank Ellington MD, 01/08/24, 11:37 AM EST.

## 2024-01-08 NOTE — DISCHARGE INSTRUCTIONS
Dr. Ellington's Instructions          DIET  Resume your regular diet.     ACTIVITY  No specific activity restrictions.     WOUND CARE & SHOWERING/BATHING  Your port site incision is covered with a plastic type material that will flake off on its own in the next few weeks.  If the plastic type material has not flaked off on its own by 2 weeks after your surgery then use tweezers to pull it off.  You have sutures in your incision but they are placed below the level of the skin and they will slowly dissolve (they do not need to be removed).  The skin around your incision will likely have some bruising.  This is normal.  Regarding the feeding tube wound, clean it with alcohol or peroxide daily and keep it covered with a sterile gauze/band-aid until any drainage stops and the skin scabs over.  The wound will likely drain fluid for a few days.  You can shower beginning tomorrow but wait two weeks after your surgery before taking any tub baths.       HOME MEDICATIONS  Resume all of your normal home medications.     PAIN CONTROL  You will receive a narcotic pain medicine prescription before you are discharged home.  Be sure to take the narcotic pain medication with some food so as not to upset your stomach.  Do not drive while you are taking the prescription pain medication.  Take Tylenol or Motrin for mild pain.     BOWEL MOVEMENTS  It is not unusual for narcotic pain medications to cause constipation.  Also, the medications and anesthesia you received for your surgery can have a constipating effect.  To help avoid constipation, drink at least four eight-ounce glasses of water each day and use over-the-counter laxatives/stool softeners (dulcolax, milk of magnesia, senokot, etc.).  I recommend drinking the aforementioned amount of water daily and taking 30 ml of milk of magnesia two times each day while you are using the prescribed narcotic pain medication.  If your bowel movements become too loose or too frequent, then  simply stop following these recommendations unless you start to feel constipated.     FOLLOW-UP VISIT & QUESTIONS/CONCERNS  Call Dr. Christensen office at [phone] and schedule a follow-up appointment for about two weeks after your surgery date.  However, if you are doing fine and don´t have any concerns then simply cancel the follow-up appointment.  Should you have any questions or concerns, please call Dr. Ellington´s office.

## 2024-01-08 NOTE — ANESTHESIA PREPROCEDURE EVALUATION
Anesthesia Evaluation     Patient summary reviewed and Nursing notes reviewed   history of anesthetic complications:  PONV  NPO Solid Status: > 8 hours  NPO Liquid Status: > 2 hours           Airway   Mallampati: I  TM distance: >3 FB  Neck ROM: full  No difficulty expected  Dental      Pulmonary - negative pulmonary ROS and normal exam    breath sounds clear to auscultation  Cardiovascular - negative cardio ROS and normal exam  Exercise tolerance: good (4-7 METS)    Rhythm: regular  Rate: normal        Neuro/Psych- negative ROS  GI/Hepatic/Renal/Endo - negative ROS     Musculoskeletal (-) negative ROS    Abdominal    Substance History - negative use     OB/GYN negative ob/gyn ROS         Other      history of cancer    ROS/Med Hx Other: PAT Nursing Notes unavailable.     Pt underweight                    Anesthesia Plan    ASA 3     general     (Patient understands anesthesia not responsible for dental damage.    Feeding tube placement for poor nutrition)  intravenous induction     Anesthetic plan, risks, benefits, and alternatives have been provided, discussed and informed consent has been obtained with: patient.    Use of blood products discussed with patient .    Plan discussed with CRNA.        CODE STATUS:

## 2024-01-08 NOTE — ANESTHESIA POSTPROCEDURE EVALUATION
Patient: Carina Serna    Procedure Summary       Date: 01/08/24 Room / Location: [facility]  / [facility]    Anesthesia Start: 1106 Anesthesia Stop: 1139    Procedure: Port-a-catheter removal and Percutaneous endoscopic gastrostomy tube removal Diagnosis:       Gastrostomy tube in place      Port-A-Cath in place      (Gastrostomy tube in place [Z93.1])      (Port-A-Cath in place [Z95.828])    Surgeons: Frank Ellington MD Provider: Popeye Sainz MD    Anesthesia Type: general ASA Status: 3            Anesthesia Type: general    Vitals  Vitals Value Taken Time   BP 93/66 01/08/24 1145   Temp 36.1 °C (97 °F) 01/08/24 1138   Pulse 68 01/08/24 1150   Resp 17 01/08/24 1138   SpO2 98 % 01/08/24 1150   Vitals shown include unfiled device data.        Post Anesthesia Care and Evaluation    Patient location during evaluation: bedside  Patient participation: complete - patient participated  Level of consciousness: awake  Pain score: 2  Pain management: adequate    Airway patency: patent  PONV Status: none  Cardiovascular status: acceptable and stable  Respiratory status: acceptable  Hydration status: acceptable    Comments: An Anesthesiologist personally participated in the most demanding procedures (including induction and emergence if applicable) in the anesthesia plan, monitored the course of anesthesia administration at frequent intervals and remained physically present and available for immediate diagnosis and treatment of emergencies.

## 2024-01-24 ENCOUNTER — TELEPHONE (OUTPATIENT)
Dept: SURGERY | Facility: CLINIC | Age: 64
End: 2024-01-24
Payer: MEDICAID

## 2024-01-30 NOTE — TELEPHONE ENCOUNTER
3RD CALL - CALLED PT TO OFFER R/S OF CX'D NO SHOW APPT 1/22 GILBERT/ SOHAN    STRAIGHT TO , YADIOM    CALLED SPOUSE LISTED ON  VERBAL, NO ANSWER, LMOM    THREE ATTEMPTS HAVE BEEN MADE TO OFFER R/S, ANYTHING ELSE TO DO?

## (undated) DEVICE — ADHS SKIN SURG TISS VISC PREMIERPRO EXOFIN HI/VISC FAST/DRY

## (undated) DEVICE — NEEDLE,18GX1.5",REG,BEVEL: Brand: MEDLINE

## (undated) DEVICE — ADHS SKIN PREMIERPRO EXOFIN TOPICAL HI/VISC .5ML

## (undated) DEVICE — SUT MNCRYL 4/0 PS2 18 IN

## (undated) DEVICE — SHEET,DRAPE,70X85,STERILE: Brand: MEDLINE

## (undated) DEVICE — SOL IRR NACL 0.9PCT BT 1000ML

## (undated) DEVICE — ANTIBACTERIAL VIOLET BRAIDED (POLYGLACTIN 910), SYNTHETIC ABSORBABLE SUTURE: Brand: COATED VICRYL

## (undated) DEVICE — INTENDED FOR TISSUE SEPARATION, AND OTHER PROCEDURES THAT REQUIRE A SHARP SURGICAL BLADE TO PUNCTURE OR CUT.: Brand: BARD-PARKER ® CARBON RIB-BACK BLADES

## (undated) DEVICE — BITEBLOCK OMNI BLOC

## (undated) DEVICE — GLOVE,SURG,SENSICARE SLT,LF,PF,7.5: Brand: MEDLINE

## (undated) DEVICE — KT PEG ENDOVIVE ENFIT SFTY PULL 20F 1P/U

## (undated) DEVICE — PENCL E/S SMOKEEVAC W/TELESCP CANN

## (undated) DEVICE — DRSNG TELFA PAD NONADH STR 1S 3X4IN

## (undated) DEVICE — ELECTRD BLD EDGE/STD 1P 2.4X6.35MM STRL

## (undated) DEVICE — SLV SCD KN/LEN ADJ EXPRSS BLENDED MD 1P/U

## (undated) DEVICE — GOWN,REINFORCE,POLY,SIRUS,BREATH SLV,XLG: Brand: MEDLINE

## (undated) DEVICE — SPONGE,NEURO,0.5"X3",XR,STRL,LF,10/PK: Brand: MEDLINE

## (undated) DEVICE — DEV ATOMIZATION MUCOSAL/NASALTRACH

## (undated) DEVICE — SUT PERMAHAND SILK 0 FSL 30IN BLK

## (undated) DEVICE — 3M(TM) MEDIPORE(TM) H SOFT CLOTH TAPE 2866: Brand: 3M™ MEDIPORE™

## (undated) DEVICE — GLV SURG BIOGEL LTX PF 7 1/2

## (undated) DEVICE — KT ANTI FOG W/FLD AND SPNG

## (undated) DEVICE — MAJOR-LF: Brand: MEDLINE INDUSTRIES, INC.

## (undated) DEVICE — VASCULAR ACCESS-LF: Brand: MEDLINE INDUSTRIES, INC.

## (undated) DEVICE — COVER,MAYO STAND,STERILE: Brand: MEDLINE

## (undated) DEVICE — MINOR-LF: Brand: MEDLINE INDUSTRIES, INC.

## (undated) DEVICE — CVR PROB ULTRASND GLS STRL

## (undated) DEVICE — ELECTRD BLD EDGE COAT 3IN